# Patient Record
Sex: MALE | Race: WHITE | NOT HISPANIC OR LATINO | Employment: OTHER | URBAN - METROPOLITAN AREA
[De-identification: names, ages, dates, MRNs, and addresses within clinical notes are randomized per-mention and may not be internally consistent; named-entity substitution may affect disease eponyms.]

---

## 2017-01-31 ENCOUNTER — OFFICE VISIT (OUTPATIENT)
Dept: DERMATOLOGY | Facility: CLINIC | Age: 77
End: 2017-01-31
Payer: MEDICARE

## 2017-01-31 VITALS — RESPIRATION RATE: 14 BRPM | HEIGHT: 70 IN

## 2017-01-31 DIAGNOSIS — D48.5 NEOPLASM OF UNCERTAIN BEHAVIOR OF SKIN: Primary | ICD-10-CM

## 2017-01-31 DIAGNOSIS — L57.8 SUN-DAMAGED SKIN: ICD-10-CM

## 2017-01-31 PROCEDURE — 99999 PR PBB SHADOW E&M-EST. PATIENT-LVL III: CPT | Mod: PBBFAC,,, | Performed by: DERMATOLOGY

## 2017-01-31 PROCEDURE — 11101 PR BIOPSY, EACH ADDED LESION: CPT | Mod: PBBFAC,PO | Performed by: DERMATOLOGY

## 2017-01-31 PROCEDURE — 88342 IMHCHEM/IMCYTCHM 1ST ANTB: CPT | Mod: 26,59,, | Performed by: PATHOLOGY

## 2017-01-31 PROCEDURE — 88341 IMHCHEM/IMCYTCHM EA ADD ANTB: CPT | Mod: 26,59,, | Performed by: PATHOLOGY

## 2017-01-31 PROCEDURE — 99213 OFFICE O/P EST LOW 20 MIN: CPT | Mod: PBBFAC,PO | Performed by: DERMATOLOGY

## 2017-01-31 PROCEDURE — 11101 PR BIOPSY, EACH ADDED LESION: CPT | Mod: S$PBB,,, | Performed by: DERMATOLOGY

## 2017-01-31 PROCEDURE — 11100 PR BIOPSY OF SKIN LESION: CPT | Mod: PBBFAC,PO | Performed by: DERMATOLOGY

## 2017-01-31 PROCEDURE — 88360 TUMOR IMMUNOHISTOCHEM/MANUAL: CPT | Mod: 26,,, | Performed by: PATHOLOGY

## 2017-01-31 PROCEDURE — 99202 OFFICE O/P NEW SF 15 MIN: CPT | Mod: 25,S$PBB,, | Performed by: DERMATOLOGY

## 2017-01-31 PROCEDURE — 88305 TISSUE EXAM BY PATHOLOGIST: CPT | Performed by: PATHOLOGY

## 2017-01-31 PROCEDURE — 11100 PR BIOPSY OF SKIN LESION: CPT | Mod: S$PBB,,, | Performed by: DERMATOLOGY

## 2017-01-31 NOTE — PROGRESS NOTES
Subjective:       Patient ID:  Bib De Oliveira Sr. is a 76 y.o. male who presents for   Chief Complaint   Patient presents with    Skin Check    Lesion     HPI Comments: Pt here for IV skin check. He has never been seen by a dermatologist  Lesion on posterior neck for a few years, itches, not treated  Lesion on left ear for 6 months, itches and sometimes bleeds, not treated    No phx of skin cancer  No fhx of skin cancer  Sun exposure daily- does not wear sunscreen    Growth on left leg x 20 years, asx, no tx.     + tobacco  No additional concerns.     Review of Systems   Constitutional: Positive for weight loss (attributes to leg surgery). Negative for fever and chills.   Skin: Negative for itching, rash, daily sunscreen use and activity-related sunscreen use.   Hematologic/Lymphatic: Negative for adenopathy.        Objective:    Physical Exam   Constitutional: He appears well-developed and well-nourished. No distress.   HENT:   Ears:    Mouth/Throat: Lips normal.    Cardiovascular: There is no local extremity swelling and no dependent edema.     Lymphadenopathy: No supraclavicular adenopathy is present.     He has no cervical adenopathy.   Neurological: He is alert and oriented to person, place, and time. He is not disoriented.   Psychiatric: He has a normal mood and affect.   Skin:   Areas Examined (abnormalities noted in diagram):   Head / Face Inspection Performed  Neck Inspection Performed  Chest / Axilla Inspection Performed  Abdomen Inspection Performed  Back Inspection Performed  RUE Inspected  LUE Inspection Performed                   Diagram Legend     Erythematous scaling macule/papule c/w actinic keratosis       Vascular papule c/w angioma      Pigmented verrucoid papule/plaque c/w seborrheic keratosis      Yellow umbilicated papule c/w sebaceous hyperplasia      Irregularly shaped tan macule c/w lentigo     1-2 mm smooth white papules consistent with Milia      Movable subcutaneous cyst with  punctum c/w epidermal inclusion cyst      Subcutaneous movable cyst c/w pilar cyst      Firm pink to brown papule c/w dermatofibroma      Pedunculated fleshy papule(s) c/w skin tag(s)      Evenly pigmented macule c/w junctional nevus     Mildly variegated pigmented, slightly irregular-bordered macule c/w mildly atypical nevus      Flesh colored to evenly pigmented papule c/w intradermal nevus       Pink pearly papule/plaque c/w basal cell carcinoma      Erythematous hyperkeratotic cursted plaque c/w SCC      Surgical scar with no sign of skin cancer recurrence      Open and closed comedones      Inflammatory papules and pustules      Verrucoid papule consistent consistent with wart     Erythematous eczematous patches and plaques     Dystrophic onycholytic nail with subungual debris c/w onychomycosis     Umbilicated papule    Erythematous-base heme-crusted tan verrucoid plaque consistent with inflamed seborrheic keratosis     Erythematous Silvery Scaling Plaque c/w Psoriasis     See annotation                      Assessment / Plan:      Pathology Orders:      Normal Orders This Visit    Tissue Specimen To Pathology, Dermatology     Questions:    Directional Terms:  Other(comment)    Clinical information:  scc vs other    Specific Site:  left helix    Tissue Specimen To Pathology, Dermatology     Questions:    Directional Terms:  Other(comment)    Clinical information:  primary cutaneous SCC vs mets SCC vs other malignancy    Specific Site:  left neck    Tissue Specimen To Pathology, Dermatology     Questions:    Directional Terms:  Other(comment)    Clinical information:  rubbery pink nodule with speckled black pigmentation- r/o melanoma vs other malignancy    Specific Site:  left leg        Neoplasm of uncertain behavior of skin  -     Tissue Specimen To Pathology, Dermatology  -     Tissue Specimen To Pathology, Dermatology  -     Tissue Specimen To Pathology, Dermatology    Discussed with patient at length my  concern for cutaneous malignancy, possibly with metastasis due to large size and duration. Plan to refer to head and neck oncology pending biopsy results.     Shave biopsy procedure note:    Shave biopsy performed after verbal consent including risk of infection, scar, recurrence, need for additional treatment of site. Area prepped with alcohol, anesthetized with approximately 1.0cc of 1% lidocaine with epinephrine. Lesional tissue shaved with razor blade. Hemostasis achieved with application of aluminum chloride followed by hyfrecation. No complications. Dressing applied. Wound care explained.        Sun-damaged skin  Recommend regular derm f/u, will f/u on above.              Return in about 3 months (around 4/30/2017).

## 2017-02-06 ENCOUNTER — TELEPHONE (OUTPATIENT)
Dept: DERMATOLOGY | Facility: CLINIC | Age: 77
End: 2017-02-06

## 2017-02-14 ENCOUNTER — LAB VISIT (OUTPATIENT)
Dept: LAB | Facility: HOSPITAL | Age: 77
End: 2017-02-14
Attending: OTOLARYNGOLOGY
Payer: MEDICARE

## 2017-02-14 ENCOUNTER — OFFICE VISIT (OUTPATIENT)
Dept: OTOLARYNGOLOGY | Facility: CLINIC | Age: 77
End: 2017-02-14
Payer: MEDICARE

## 2017-02-14 VITALS
TEMPERATURE: 96 F | SYSTOLIC BLOOD PRESSURE: 130 MMHG | HEART RATE: 56 BPM | BODY MASS INDEX: 25.43 KG/M2 | WEIGHT: 177.25 LBS | DIASTOLIC BLOOD PRESSURE: 80 MMHG

## 2017-02-14 DIAGNOSIS — C44.219 BASAL CELL CARCINOMA, EAR, LEFT: ICD-10-CM

## 2017-02-14 DIAGNOSIS — C44.42 SQUAMOUS CELL CARCINOMA OF SKIN OF NECK: ICD-10-CM

## 2017-02-14 DIAGNOSIS — C44.42 SQUAMOUS CELL CARCINOMA OF SKIN OF NECK: Primary | ICD-10-CM

## 2017-02-14 DIAGNOSIS — R63.4 WEIGHT LOSS: ICD-10-CM

## 2017-02-14 LAB
ALBUMIN SERPL BCP-MCNC: 3.7 G/DL
ALBUMIN SERPL BCP-MCNC: 3.7 G/DL
ALP SERPL-CCNC: 101 U/L
ALP SERPL-CCNC: 101 U/L
ALT SERPL W/O P-5'-P-CCNC: 19 U/L
ALT SERPL W/O P-5'-P-CCNC: 19 U/L
ANION GAP SERPL CALC-SCNC: 6 MMOL/L
AST SERPL-CCNC: 16 U/L
AST SERPL-CCNC: 16 U/L
BASOPHILS # BLD AUTO: 0.02 K/UL
BASOPHILS NFR BLD: 0.3 %
BILIRUB DIRECT SERPL-MCNC: 0.1 MG/DL
BILIRUB SERPL-MCNC: 0.4 MG/DL
BILIRUB SERPL-MCNC: 0.4 MG/DL
BUN SERPL-MCNC: 13 MG/DL
CALCIUM SERPL-MCNC: 9.7 MG/DL
CHLORIDE SERPL-SCNC: 108 MMOL/L
CO2 SERPL-SCNC: 27 MMOL/L
CREAT SERPL-MCNC: 0.9 MG/DL
CREAT SERPL-MCNC: 0.9 MG/DL
DIFFERENTIAL METHOD: ABNORMAL
EOSINOPHIL # BLD AUTO: 0.1 K/UL
EOSINOPHIL NFR BLD: 1.8 %
ERYTHROCYTE [DISTWIDTH] IN BLOOD BY AUTOMATED COUNT: 15.4 %
EST. GFR  (AFRICAN AMERICAN): >60 ML/MIN/1.73 M^2
EST. GFR  (AFRICAN AMERICAN): >60 ML/MIN/1.73 M^2
EST. GFR  (NON AFRICAN AMERICAN): >60 ML/MIN/1.73 M^2
EST. GFR  (NON AFRICAN AMERICAN): >60 ML/MIN/1.73 M^2
GLUCOSE SERPL-MCNC: 113 MG/DL
HCT VFR BLD AUTO: 41.9 %
HGB BLD-MCNC: 14.2 G/DL
LYMPHOCYTES # BLD AUTO: 2 K/UL
LYMPHOCYTES NFR BLD: 31.4 %
MCH RBC QN AUTO: 30.5 PG
MCHC RBC AUTO-ENTMCNC: 33.9 %
MCV RBC AUTO: 90 FL
MONOCYTES # BLD AUTO: 0.6 K/UL
MONOCYTES NFR BLD: 9.1 %
NEUTROPHILS # BLD AUTO: 3.7 K/UL
NEUTROPHILS NFR BLD: 57.1 %
PLATELET # BLD AUTO: 235 K/UL
PMV BLD AUTO: 10 FL
POTASSIUM SERPL-SCNC: 4 MMOL/L
PREALB SERPL-MCNC: 20 MG/DL
PROT SERPL-MCNC: 7.3 G/DL
PROT SERPL-MCNC: 7.3 G/DL
RBC # BLD AUTO: 4.65 M/UL
SODIUM SERPL-SCNC: 141 MMOL/L
WBC # BLD AUTO: 6.49 K/UL

## 2017-02-14 PROCEDURE — 99999 PR PBB SHADOW E&M-EST. PATIENT-LVL III: CPT | Mod: PBBFAC,,, | Performed by: OTOLARYNGOLOGY

## 2017-02-14 PROCEDURE — 84134 ASSAY OF PREALBUMIN: CPT

## 2017-02-14 PROCEDURE — 80053 COMPREHEN METABOLIC PANEL: CPT

## 2017-02-14 PROCEDURE — 36415 COLL VENOUS BLD VENIPUNCTURE: CPT

## 2017-02-14 PROCEDURE — 85025 COMPLETE CBC W/AUTO DIFF WBC: CPT

## 2017-02-14 PROCEDURE — 99213 OFFICE O/P EST LOW 20 MIN: CPT | Mod: PBBFAC | Performed by: OTOLARYNGOLOGY

## 2017-02-14 PROCEDURE — 99204 OFFICE O/P NEW MOD 45 MIN: CPT | Mod: S$PBB,,, | Performed by: OTOLARYNGOLOGY

## 2017-02-14 NOTE — PROGRESS NOTES
Chief Complaint   Patient presents with    Follow-up         76 y.o. male presents with one year history of left ear and left posterior neck mass. Was recently evaluated by Dermatologist, Dr. Inafnte who performed biopsies of both sites. 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 He was also noted to have a left lower extremity mass for which he will see Dr. Zhu. 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  No previous history of skin cancers.      Review of Systems     Constitutional: Negative for fatigue and unexpected weight change.   HENT: per HPI.   Eyes: Negative for visual disturbance.   Respiratory: Negative for shortness of breath, hemoptysis  Cardiovascular: Negative for chest pain and palpitations.   Genitourinary: Negative for dysuria and difficulty urinating.   Musculoskeletal: Negative for decreased ROM, back pain.   Skin: Negative for rash, sunburn, itching.   Neurological: Negative for dizziness and seizures.   Hematological: Negative for adenopathy. Does not bruise/bleed easily.   Psychiatric/Behavioral: Negative for agitation. The patient is not nervous/anxious.   Endocrine: Negative for rapid weight loss/weight gain, heat/cold intolerance.     Past Medical History   Diagnosis Date    Basal cell carcinoma of left ear 12/2/2015    Closed fracture of left tibial plateau with routine healing s/p ORIF on 7/15/2016 7/5/2016    Essential hypertension, benign 12/2/2015    Hyperlipidemia     Type 2 diabetes mellitus with complication 12/2/2015       Past Surgical History   Procedure Laterality Date    Hernia repair      Orif tibia fracture  07/15/2016    External fixation tibial fracture  07/05/2016       family history includes Cancer in his brother and sister; Diabetes in his mother and sister.    Pt  reports that he has been smoking.  He has a 90.00 pack-year smoking history. He does not have any  smokeless tobacco history on file. He reports that he does not drink alcohol or use illicit drugs.    Review of patient's allergies indicates:  No Known Allergies     Physical Exam    Vitals:    02/14/17 1030   BP: 130/80   Pulse: (!) 56   Temp: (!) 95.9 °F (35.5 °C)     Body mass index is 25.43 kg/(m^2).    Physical Exam   Constitutional: He is oriented to person, place, and time. He appears well-developed and well-nourished. No distress.   HENT:   Head: Normocephalic and atraumatic.       Right Ear: Hearing, tympanic membrane, external ear and ear canal normal. Tympanic membrane mobility is normal. No middle ear effusion. No decreased hearing is noted.   Left Ear: Hearing, tympanic membrane, external ear and ear canal normal. Tympanic membrane mobility is normal.  No middle ear effusion. No decreased hearing is noted.   Nose: Nose normal.   Mouth/Throat: Oropharynx is clear and moist.   Eyes: Conjunctivae, EOM and lids are normal. Pupils are equal, round, and reactive to light. Right eye exhibits no discharge. Left eye exhibits no discharge.   Neck: Trachea normal, normal range of motion and phonation normal. Neck supple. No tracheal tenderness present. No tracheal deviation, no edema and no erythema present. No thyroid mass and no thyromegaly present.   Cardiovascular: Normal heart sounds.    Pulmonary/Chest: Breath sounds normal. No stridor.   Abdominal: Soft.   Lymphadenopathy:     He has no cervical adenopathy (shotty adenopathy in left posterior triangle).   Neurological: He is alert and oriented to person, place, and time.   Skin: Skin is warm and dry. No rash noted. He is not diaphoretic. No erythema. No pallor.   Psychiatric: He has a normal mood and affect.   Nursing note and vitals reviewed.    See previous images.    Assessment     1. 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 Basal cell carcinoma, ear, left          Plan  In summary, Mr. De Oliveira is a 77 year old male with left external ear basal  cell carcinoma and left posterior neck skin squamous cell carcinoma. No significant adenopathy to palpation however given the extent of his posterior neck skin disease must have a high suspicion of regional involvement. Will obtain CT neck to better assess. Follow up once study complete for treatment planning.

## 2017-02-14 NOTE — LETTER
February 21, 2017      Ngoc Infante MD  [address] [facility] [address]  [facility] [address]           [facility]  [address]  [facility] [address]  Phone: [phone]  Fax: [phone]          Patient: Bib De Oliveira Sr.   MR Number: [id number]   YOB: 1940   Date of Visit: 2/14/2017       Dear Dr. Ngoc Infante:    Thank you for referring Bib De Oliveira to me for evaluation. Attached you will find relevant portions of my assessment and plan of care.    If you have questions, please do not hesitate to call me. I look forward to following Bib De Oliveira along with you.    Sincerely,    Franny Sams MD    Enclosure  CC:  No Recipients    If you would like to receive this communication electronically, please contact [email or web address] or [phone] to request more information on [employer] Link access.    For providers and/or their staff who would like to refer a patient to [facility], please contact us through our one-stop-shop provider referral line, [facility], at [phone].    If you feel you have received this communication in error or would no longer like to receive these types of communications, please e-mail [email or web address]

## 2017-02-22 ENCOUNTER — HOSPITAL ENCOUNTER (OUTPATIENT)
Dept: RADIOLOGY | Facility: HOSPITAL | Age: 77
Discharge: HOME | End: 2017-02-22
Attending: OTOLARYNGOLOGY
Payer: MEDICARE

## 2017-02-22 ENCOUNTER — INITIAL CONSULT (OUTPATIENT)
Dept: DERMATOLOGY | Facility: CLINIC | Age: 77
End: 2017-02-22
Payer: MEDICARE

## 2017-02-22 VITALS
SYSTOLIC BLOOD PRESSURE: 122 MMHG | HEIGHT: 70 IN | BODY MASS INDEX: 25.05 KG/M2 | HEART RATE: 99 BPM | WEIGHT: 175 LBS | DIASTOLIC BLOOD PRESSURE: 87 MMHG

## 2017-02-22 DIAGNOSIS — C44.219 BASAL CELL CARCINOMA, EAR, LEFT: ICD-10-CM

## 2017-02-22 DIAGNOSIS — C44.719 BASAL CELL CARCINOMA, LEG, LEFT: Primary | ICD-10-CM

## 2017-02-22 DIAGNOSIS — C44.42 SQUAMOUS CELL CARCINOMA OF SKIN OF NECK: ICD-10-CM

## 2017-02-22 PROCEDURE — 25500020 PHARM REV CODE 255: Mod: PO | Performed by: OTOLARYNGOLOGY

## 2017-02-22 PROCEDURE — 70492 CT SFT TSUE NCK W/O & W/DYE: CPT | Mod: TC,PO

## 2017-02-22 PROCEDURE — 99213 OFFICE O/P EST LOW 20 MIN: CPT | Mod: S$PBB,,, | Performed by: DERMATOLOGY

## 2017-02-22 PROCEDURE — 70492 CT SFT TSUE NCK W/O & W/DYE: CPT | Mod: 26,,, | Performed by: RADIOLOGY

## 2017-02-22 PROCEDURE — 99999 PR PBB SHADOW E&M-EST. PATIENT-LVL III: CPT | Mod: PBBFAC,,, | Performed by: DERMATOLOGY

## 2017-02-22 RX ADMIN — IOHEXOL 75 ML: 350 INJECTION, SOLUTION INTRAVENOUS at 01:02

## 2017-02-22 NOTE — LETTER
February 22, 2017      Ngoc Infante MD  [address] [facility] [address] [place] [address]           [facility]  [address] [facility] [address] [place] [address]  Phone: [phone]          Patient: Bib De Oliveira Sr.   MR Number: [id number]   YOB: 1940   Date of Visit: 2/22/2017       Dear Dr. Ngoc Infante:    Thank you for referring Bib De Oliveira to me for evaluation. Attached you will find relevant portions of my assessment and plan of care.    If you have questions, please do not hesitate to call me. I look forward to following Bib De Oliveira along with you.    Sincerely,    Gilberto Katz MD    Enclosure  CC:  No Recipients    If you would like to receive this communication electronically, please contact [email or web address] or [phone] to request more information on [employer] Link access.    For providers and/or their staff who would like to refer a patient to [facility], please contact us through our one-stop-shop provider referral line, [facility], at [phone].    If you feel you have received this communication in error or would no longer like to receive these types of communications, please e-mail [email or web address]

## 2017-02-22 NOTE — MR AVS SNAPSHOT
[facility]at[facility]  [address] [facility] [address]  [facility] [address]  Phone: [phone]                  Bib BULL Alvino Becker   2017 10:30 AM   Initial consult    Description:  Male : 1940   Provider:  Gilberto Katz MD   Department:  [facility] - Dermatology           Reason for Visit     Basal Cell Carcinoma                To Do List           Future Appointments        Provider Department Dept Phone    2017 10:30 AM Gilberto Katz MD [facility] [phone]    2017 1:15 PM [facility] CT1 LIMIT [address] L[address] [facility] [phone]    3/7/2017 11:15 AM Franny Sams MD [facility] - Head[facility]Neck Surg [facility] [phone]      Goals (5 Years of Data)     None      [facility] On Call     [facility] On Call Nurse Care Line -  Assistance  Registered nurses in the [facility] Center provide clinical advisement, health education, appointment booking, and other advisory services.  Call for this free service at [phone].             Medications           Message regarding Medications     Verify the changes and/or additions to your medication regime listed below are the same as discussed with your clinician today.  If any of these changes or additions are incorrect, please notify your healthcare provider.        STOP taking these medications     rivaroxaban (XARELTO) 20 mg Tab Take 1 tablet (20 mg total) by mouth daily with dinner or evening meal.           Verify that the below list of medications is an accurate representation of the medications you are currently taking.  If none reported, the list may be blank. If incorrect, please contact your healthcare provider. Carry this list with you in case of emergency.           Current Medications     amiloride-hydrochlorothiazide 5-50mg (MODURETIC 5-50) 5-50 mg Tab TAKE 1/2 TABLET BY MOUTH EVERY DAY WITH FOOD, every morning    atorvastatin (LIPITOR) 10 MG tablet Take 1 tablet (10 mg total) by mouth once daily.     "lisinopril (PRINIVIL,ZESTRIL) 20 MG tablet Take 1 tablet (20 mg total) by mouth once daily.           Clinical Reference Information           Your Vitals Were     BP Pulse Height Weight BMI    122/87 (BP Location: Left arm, Patient Position: Sitting, BP Method: Automatic) 99 5' 10" (1.778 m) 79.4 kg (175 lb) 25.11 kg/m2      Blood Pressure          Most Recent Value    BP  122/87      Allergies as of 2/22/2017     No Known Allergies      Immunizations Administered on Date of Encounter - 2/22/2017     None      [facility] Sign-Up     Activating your [facility] account is as easy as 1-2-3!     1) Visit [email or web address], select Sign Up Now, enter this activation code and your date of birth, then select Next.  [email or web address]  Expires: 4/2/2017 10:08 AM      2) Create a username and password to use when you visit [facility] in the future and select a security question in case you lose your password and select Next.    3) Enter your e-mail address and click Sign Up!    Additional Information  If you have questions, please e-mail [email or web address] or call [phone] to talk to our [facility] staff. Remember, [facility] is NOT to be used for urgent needs. For medical emergencies, dial 911.         Smoking Cessation     If you would like to quit smoking:   You may be eligible for free services if you are a [place] resident and started smoking cigarettes before September 1, 1988.  Call the Smoking Cessation Trust (SCT) toll free at (772) 399-2910 or (798) 697-8757.   Call 1-800-QUIT-NOW if you do not meet the above criteria.            Language Assistance Services     ATTENTION: Language assistance services are available, free of charge. Please call [phone].      ATENCIÓN: Si habla español, tiene a gasca disposición servicios gratuitos de asistencia lingüística. Llame al [phone].     CHÚ Ý: N?u b?n nói Ti?ng Vi?t, có các d?ch v? h? tr? ngôn ng? mi?n phí dành cho b?n. G?i s? [phone].         [facility] complies with applicable Federal civil rights laws and does not discriminate on the basis of race, color, national origin, age, disability, or sex.

## 2017-02-22 NOTE — PROGRESS NOTES
ALLERGIES:  Review of patient's allergies indicates no known allergies.    CHIEF COMPLAINT:  This 76 y.o. male comes for evaluation for Mohs' Micrographic Surgery, Fresh Tissue Technique, for treatment of a biopsy-proven basal cell carcinoma on the left leg. Consultation requested by Ngoc Infante MD.    The patient is accompanied to this visit by his wife.     HISTORY OF PRESENT ILLNESS:   Location: left leg  Duration: 15-20years or more  Quality: persistent  Context: status post biopsy by Ngoc Infante MD; path = basal cell carcinoma; pathology accession #[id number], [facility]     Prior Treatment: none  See also the handwritten notes/diagrams scanned to chart for additional details.    Defibrillator: No  Pacemaker: No  Artificial heart valves: No  Artificial joints: No    REVIEW OF SYSTEMS:   General: general health good  Skin: he also has a large squamous cell carcinoma on the posterior left neck and a large basal cell carcinoma on the right ear; for both of which he is recently saw Dr. Sams  CV: has hypertension, no artificial valves  Resp: has no respiratory problems  Endo: has no diabetes  Hem/Lymph: not taking prescribed anticoagulants  Allergy/Immuno: has no allergies as noted above  GI: has no history of hepatitis  MS: as noted above     PAST MEDICAL HISTORY:  Past Medical History   Diagnosis Date    Basal cell carcinoma of left ear 12/2/2015    Closed fracture of left tibial plateau with routine healing s/p ORIF on 7/15/2016 7/5/2016    Essential hypertension, benign 12/2/2015    Hyperlipidemia     Type 2 diabetes mellitus with complication 12/2/2015       PAST SURGICAL HISTORY:  Past Surgical History   Procedure Laterality Date    Hernia repair      Orif tibia fracture  07/15/2016    External fixation tibial fracture  07/05/2016        SOCIAL HISTORY:  Dependencies: smoking status as noted below  Social History   Substance Use Topics    Smoking status: Current Every Day Smoker      Packs/day: 1.50     Years: 60.00    Smokeless tobacco: None    Alcohol use No       PERTINENT MEDICATIONS:  See medications list.  Current Outpatient Prescriptions on File Prior to Visit   Medication Sig Dispense Refill    amiloride-hydrochlorothiazide 5-50mg (MODURETIC 5-50) 5-50 mg Tab TAKE 1/2 TABLET BY MOUTH EVERY DAY WITH FOOD, every morning 30 tablet 3    atorvastatin (LIPITOR) 10 MG tablet Take 1 tablet (10 mg total) by mouth once daily. 30 tablet 3    lisinopril (PRINIVIL,ZESTRIL) 20 MG tablet Take 1 tablet (20 mg total) by mouth once daily. 30 tablet 3    [DISCONTINUED] rivaroxaban (XARELTO) 20 mg Tab Take 1 tablet (20 mg total) by mouth daily with dinner or evening meal. 30 tablet 3     No current facility-administered medications on file prior to visit.      ALLERGIES:  Review of patient's allergies indicates no known allergies.    EXAM:  See also the handwritten notes/diagrams scanned to chart for additional details.  Constitutional  General appearance: well-developed, well-nourished, well-kempt older white male    Eyes  Inspection of conjunctivae and lids reveals no abnormalities; sclerae anicteric  Neurologic/Psychiatric  Alert,  normal orientation to time, place, person  Normal mood and affect with no evidence of depression, anxiety, agitation  Skin: see photo(s)  Head: background marked solar damage to exposed areas of skin; in addition, inspection/palpation reveals an area of ill-defined sclerotic changes of the left ear  Neck:  on his posterior neck there is a 3-4 cm tumor  Right upper extremity: examination reveals marked chronic solar damage  Left upper extremity: examination reveals marked chronic solar damage  Left lower extremity: on his left proximal anterior/medial leg there is a 2 cm eschar with an ill-defined area of surrounding erythem    ASSESSMENT: biopsy-proven basal cell carcinoma of the left leg, ill-defined  squamous cell carcinoma of posterior neck  basal cell carcinoma  left ear  chronic solar damage to areas as noted above    PLAN:  The diagnosis of the lesion on his left leg and management options, and risks and benefits of the alternatives, including observation/non-treatment, radiation treatment, excision with vertical frozen section or paraffin-embedded section margin evaluation, and Mohs' Micrographic Surgery, Fresh Tissue Technique, were discussed at length with the patient. In particular, the discussion included, but was not limited to, the following:    One alternative at this point would be to defer further treatment and observe the lesion. With small skin cancers of this kind, it is possible that a biopsy can be sufficient to definitively treat a small skin cancer of this kind. Alternatively, some skin cancers are slow growing and do not require immediate treatment. The potential advantage of this choice would be to avoid the need for possibly unnecessary additional surgery. Among the potential disadvantages of this would be the possibility of enlargement of the lesion, more extensive spread of the lesion or recurrence at a later date, which might necessitate a larger and more complex surgery.    Radiation treatment can be an effective treatment for this type of skin cancer. The usual course of treatment is every weekday for several weeks. Local irritation will result from treatment, although no systemic side effects are expected. The potential advantage of radiation treatment is that it avoids the need for surgery. Among the disadvantages of radiation treatment are the length of treatment, the local inflammatory response, the absence of pathologic confirmation of the removal of the skin cancer, a possible increased risk of additional skin cancer in the treated area in later years, and a somewhat increased risk of recurrence at a later date.     Excisional surgery can be an effective treatment for this type of skin cancer. This would involve excision of the lesion with  margin evaluation by submitting the specimen to a pathologist for either immediate marginal assessment via frozen section processing, or delayed marginal assessment by fixed-tissue processing. The potential advantage of this technique is that it offers a way of treating the lesion with some degree of histologic confirmation of tumor removal. Among the disadvantages of this treatment are the possible need for re-excision if marginal involvement is identified, a somewhat greater likelihood of recurrence as compared to Mohs' surgery because of the less comprehensive margin evaluation inherent in the technique, and the general potential risks of surgery, including allergic reactions to the anesthetic and other materials used, infection, injury to nerves in the area with consequent loss of sensation or muscle function, and scarring or distortion of surrounding structures.    Mohs' surgery is a very effective treatment for this type of skin cancer. The potential advantage of Mohs' surgery is that this technique offers the greatest possible certainty of knowing that the skin cancer has been completely removed, with the removal of the least amount of normal tissue. The potential disadvantages of Mohs' surgery include the duration of the surgery, the possible need for a separate surgery for reconstruction following tumor removal, and scarring as a result. In addition, general potential risks of surgery as noted above also apply to treatment via Mohs' surgery.    In light of the ill-defined nature of this tumor,  Mohs' micrographic surgery was thought to be the most appropriate management choice, and this diagnosis is appropriate for treatment by Mohs' micrographic surgery.     We also discussed the lesions on his left ear and posterior neck.  Under the circumstances, we will defer treatment of the lesion on his leg, pending completion of treatment of the lesions on his neck and left ear by Dr. Sams.    Sufficient time  was available for questions, and all questions were answered to his satisfaction.     He is to return in six weeks for followup and to call prn sooner.    --------------------------------------  Note: some or all of this note may have been generated using voice recognition software and thus may contain grammatical and/or spelling errors.

## 2017-03-07 ENCOUNTER — OFFICE VISIT (OUTPATIENT)
Dept: OTOLARYNGOLOGY | Facility: CLINIC | Age: 77
End: 2017-03-07
Payer: MEDICARE

## 2017-03-07 ENCOUNTER — HOSPITAL ENCOUNTER (OUTPATIENT)
Dept: RADIOLOGY | Facility: HOSPITAL | Age: 77
Discharge: HOME | End: 2017-03-07
Attending: OTOLARYNGOLOGY
Payer: MEDICARE

## 2017-03-07 ENCOUNTER — HOSPITAL ENCOUNTER (OUTPATIENT)
Dept: CARDIOLOGY | Facility: CLINIC | Age: 77
Discharge: HOME | End: 2017-03-07
Payer: MEDICARE

## 2017-03-07 VITALS
WEIGHT: 179.88 LBS | BODY MASS INDEX: 25.81 KG/M2 | TEMPERATURE: 96 F | SYSTOLIC BLOOD PRESSURE: 112 MMHG | HEART RATE: 73 BPM | DIASTOLIC BLOOD PRESSURE: 76 MMHG

## 2017-03-07 DIAGNOSIS — C44.42 SQUAMOUS CELL CARCINOMA OF SKIN OF NECK: ICD-10-CM

## 2017-03-07 DIAGNOSIS — C44.219 BASAL CELL CARCINOMA OF LEFT EAR: ICD-10-CM

## 2017-03-07 DIAGNOSIS — C44.42 SQUAMOUS CELL CARCINOMA OF SKIN OF NECK: Primary | ICD-10-CM

## 2017-03-07 PROCEDURE — 99214 OFFICE O/P EST MOD 30 MIN: CPT | Mod: PBBFAC,25 | Performed by: OTOLARYNGOLOGY

## 2017-03-07 PROCEDURE — 93010 ELECTROCARDIOGRAM REPORT: CPT | Mod: S$PBB,,, | Performed by: INTERNAL MEDICINE

## 2017-03-07 PROCEDURE — 99214 OFFICE O/P EST MOD 30 MIN: CPT | Mod: S$PBB,,, | Performed by: OTOLARYNGOLOGY

## 2017-03-07 PROCEDURE — 99999 PR PBB SHADOW E&M-EST. PATIENT-LVL IV: CPT | Mod: PBBFAC,,, | Performed by: OTOLARYNGOLOGY

## 2017-03-07 PROCEDURE — 93005 ELECTROCARDIOGRAM TRACING: CPT | Mod: PBBFAC | Performed by: INTERNAL MEDICINE

## 2017-03-07 PROCEDURE — 71020 XR CHEST PA AND LATERAL: CPT | Mod: 26,,, | Performed by: RADIOLOGY

## 2017-03-07 RX ORDER — LIDOCAINE HYDROCHLORIDE 10 MG/ML
1 INJECTION, SOLUTION EPIDURAL; INFILTRATION; INTRACAUDAL; PERINEURAL ONCE
Status: CANCELLED | OUTPATIENT
Start: 2017-03-07 | End: 2017-03-07

## 2017-03-07 NOTE — PROGRESS NOTES
Chief Complaint   Patient presents with    Follow-up         76 y.o. male presents with one year history of left ear and left posterior neck mass. Was recently evaluated by Dermatologist, Dr. Infante who performed biopsies of both sites. 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 He was also noted to have a left lower extremity mass for which he will see Dr. Zhu. 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  No previous history of skin cancers.    Here for follow up after CT neck. No new symptoms. Off of blood thinners.    Review of Systems     Constitutional: Negative for fatigue and unexpected weight change.   HENT: per HPI.   Eyes: Negative for visual disturbance.   Respiratory: Negative for shortness of breath, hemoptysis  Cardiovascular: Negative for chest pain and palpitations.   Genitourinary: Negative for dysuria and difficulty urinating.   Musculoskeletal: Negative for decreased ROM, back pain.   Skin: Negative for rash, sunburn, itching.   Neurological: Negative for dizziness and seizures.   Hematological: Negative for adenopathy. Does not bruise/bleed easily.   Psychiatric/Behavioral: Negative for agitation. The patient is not nervous/anxious.   Endocrine: Negative for rapid weight loss/weight gain, heat/cold intolerance.     Past Medical History:   Diagnosis Date    Basal cell carcinoma of left ear 12/2/2015    Closed fracture of left tibial plateau with routine healing s/p ORIF on 7/15/2016 7/5/2016    Essential hypertension, benign 12/2/2015    Hyperlipidemia     Type 2 diabetes mellitus with complication 12/2/2015       Past Surgical History:   Procedure Laterality Date    EXTERNAL FIXATION TIBIAL FRACTURE  07/05/2016    HERNIA REPAIR      ORIF TIBIA FRACTURE  07/15/2016       family history includes Cancer in his brother and sister; Diabetes in his mother and sister.    Pt  reports that he has been  smoking.  He has a 90.00 pack-year smoking history. He does not have any smokeless tobacco history on file. He reports that he does not drink alcohol or use illicit drugs.    Review of patient's allergies indicates:  No Known Allergies     Physical Exam    Vitals:    03/07/17 1041   BP: 112/76   Pulse: 73   Temp: 96 °F (35.6 °C)     Body mass index is 25.81 kg/(m^2).    Physical Exam   Constitutional: He is oriented to person, place, and time. He appears well-developed and well-nourished. No distress.   HENT:   Head: Normocephalic and atraumatic.       Right Ear: Hearing, tympanic membrane, external ear and ear canal normal. Tympanic membrane mobility is normal. No middle ear effusion. No decreased hearing is noted.   Left Ear: Hearing, tympanic membrane, external ear and ear canal normal. Tympanic membrane mobility is normal.  No middle ear effusion. No decreased hearing is noted.   Nose: Nose normal.   Mouth/Throat: Oropharynx is clear and moist.   Eyes: Conjunctivae, EOM and lids are normal. Pupils are equal, round, and reactive to light. Right eye exhibits no discharge. Left eye exhibits no discharge.   Neck: Trachea normal, normal range of motion and phonation normal. Neck supple. No tracheal tenderness present. No tracheal deviation, no edema and no erythema present. No thyroid mass and no thyromegaly present.   Cardiovascular: Normal heart sounds.    Pulmonary/Chest: Breath sounds normal. No stridor.   Abdominal: Soft.   Lymphadenopathy:     He has no cervical adenopathy.   Neurological: He is alert and oriented to person, place, and time.   Skin: Skin is warm and dry. No rash noted. He is not diaphoretic. No erythema. No pallor.   Psychiatric: He has a normal mood and affect.   Nursing note and vitals reviewed.    See previous images.    Studies reviewed:  CT Neck  2/22/17:  Centered on image #23 series #4 is a skin mass measuring 3.5 cm transversely by 1.1 cm AP by 3.8 cm cranial caudal.  The deepest  portion of the mass extends nearly to the posterior paraspinous musculature with fat stranding and a gap measuring approximately 2 mm.  A normal size but somewhat indistinct level V lymph node is seen measuring 5 mm in smallest dimensions on series #4 image #23.  This is positioned posterior to the sternocleidomastoid muscle and approximately 3.3 cm from the central portion of the mass.  Tonsillar calcifications are noted bilaterally.  Moderate cervical spondylosis.  No distant efrain lesions are identified.  Within the thyroid gland in the upper lobe there is a well-defined hypodensity measuring 6 mm.  There is a large goiter and hypoenhancing mass which contains microcalcifications in the left lobe of the thyroid which extends nearly substernally and deviates the trachea to the right of midline.  In maximal dimensions this measures 3.9 cm AP by 3.3 cm transversely by 5.0 cm craniocaudal.  The laryngeal structures are incompletely evaluated due to motion on both the pre-and post contrast examination.  No additional lymph nodes are seen.  The parotid glands and submandibular glands are unremarkable.      Assessment     1. 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 Basal cell carcinoma of left ear          Plan  In summary, Mr. De Oliveira is a 77 year old male with left external ear basal cell carcinoma and left posterior neck skin squamous cell carcinoma. CT without evidence of regional efrain disease. Discussed treatment options xrt vs surgical excision, recommend surgery at this time which would entail wide local excision of ear and posterior neck as well as sentinel lymph node biopsy for posterior neck squamous cell carcinoma. Risks include, but are not limited to pain, bleeding, infection, scar, need for further procedures. Will likely need postoperative radiation given large size of posterior neck disease, which was also discussed. All questions were answered. Plan for surgery 3/17/17.

## 2017-03-13 ENCOUNTER — TELEPHONE (OUTPATIENT)
Dept: OTOLARYNGOLOGY | Facility: CLINIC | Age: 77
End: 2017-03-13

## 2017-03-13 NOTE — TELEPHONE ENCOUNTER
----- Message from Debra Davis sent at 3/13/2017 10:11 AM CDT -----  Call patient's son with surgery date. Call son at .

## 2017-03-13 NOTE — TELEPHONE ENCOUNTER
Spoke with Bib , son, informed him that someone will call the patient the day before surgery to give them the arrival time. Mr Bib Dunaway verbalized understanding.

## 2017-03-17 ENCOUNTER — HOSPITAL ENCOUNTER (OUTPATIENT)
Dept: RADIOLOGY | Facility: HOSPITAL | Age: 77
Discharge: HOME | End: 2017-03-17
Attending: OTOLARYNGOLOGY
Payer: MEDICARE

## 2017-03-17 ENCOUNTER — ANESTHESIA (OUTPATIENT)
Dept: SURGERY | Facility: HOSPITAL | Age: 77
End: 2017-03-17
Payer: MEDICARE

## 2017-03-17 ENCOUNTER — HOSPITAL ENCOUNTER (OUTPATIENT)
Facility: HOSPITAL | Age: 77
LOS: 1 days | Discharge: HOME | End: 2017-03-18
Attending: OTOLARYNGOLOGY | Admitting: OTOLARYNGOLOGY
Payer: MEDICARE

## 2017-03-17 ENCOUNTER — ANESTHESIA EVENT (OUTPATIENT)
Dept: SURGERY | Facility: HOSPITAL | Age: 77
End: 2017-03-17
Payer: MEDICARE

## 2017-03-17 DIAGNOSIS — C44.42 SQUAMOUS CELL CARCINOMA OF SKIN OF NECK: Primary | ICD-10-CM

## 2017-03-17 DIAGNOSIS — C44.42 SQUAMOUS CELL CARCINOMA OF SKIN OF NECK: ICD-10-CM

## 2017-03-17 DIAGNOSIS — C44.219 BASAL CELL CARCINOMA OF LEFT EAR: ICD-10-CM

## 2017-03-17 LAB
POCT GLUCOSE: 118 MG/DL (ref 70–110)
POCT GLUCOSE: 123 MG/DL (ref 70–110)

## 2017-03-17 PROCEDURE — 71000039 HC RECOVERY, EACH ADD'L HOUR: Performed by: OTOLARYNGOLOGY

## 2017-03-17 PROCEDURE — 63600175 PHARM REV CODE 636 W HCPCS: Performed by: OTOLARYNGOLOGY

## 2017-03-17 PROCEDURE — 25000003 PHARM REV CODE 250: Performed by: OTOLARYNGOLOGY

## 2017-03-17 PROCEDURE — 25000003 PHARM REV CODE 250: Performed by: NURSE ANESTHETIST, CERTIFIED REGISTERED

## 2017-03-17 PROCEDURE — 82962 GLUCOSE BLOOD TEST: CPT | Performed by: OTOLARYNGOLOGY

## 2017-03-17 PROCEDURE — 51798 US URINE CAPACITY MEASURE: CPT

## 2017-03-17 PROCEDURE — 11626 EXC S/N/H/F/G MAL+MRG >4 CM: CPT | Mod: 51,GC,, | Performed by: OTOLARYNGOLOGY

## 2017-03-17 PROCEDURE — 71000033 HC RECOVERY, INTIAL HOUR: Performed by: OTOLARYNGOLOGY

## 2017-03-17 PROCEDURE — 63600175 PHARM REV CODE 636 W HCPCS: Performed by: NURSE ANESTHETIST, CERTIFIED REGISTERED

## 2017-03-17 PROCEDURE — 36000706: Performed by: OTOLARYNGOLOGY

## 2017-03-17 PROCEDURE — 13132 CMPLX RPR F/C/C/M/N/AX/G/H/F: CPT | Mod: 59,GC,, | Performed by: OTOLARYNGOLOGY

## 2017-03-17 PROCEDURE — 69110 REMOVE EXTERNAL EAR PARTIAL: CPT | Mod: LT,GC,, | Performed by: OTOLARYNGOLOGY

## 2017-03-17 PROCEDURE — 37000009 HC ANESTHESIA EA ADD 15 MINS: Performed by: OTOLARYNGOLOGY

## 2017-03-17 PROCEDURE — 78195 LYMPH SYSTEM IMAGING: CPT | Mod: 26,,, | Performed by: NUCLEAR MEDICINE

## 2017-03-17 PROCEDURE — 78195 LYMPH SYSTEM IMAGING: CPT | Mod: TC

## 2017-03-17 PROCEDURE — 88331 PATH CONSLTJ SURG 1 BLK 1SPC: CPT | Mod: 26,,, | Performed by: PATHOLOGY

## 2017-03-17 PROCEDURE — 37000008 HC ANESTHESIA 1ST 15 MINUTES: Performed by: OTOLARYNGOLOGY

## 2017-03-17 PROCEDURE — 25000003 PHARM REV CODE 250: Performed by: STUDENT IN AN ORGANIZED HEALTH CARE EDUCATION/TRAINING PROGRAM

## 2017-03-17 PROCEDURE — 25000003 PHARM REV CODE 250: Performed by: ANESTHESIOLOGY

## 2017-03-17 PROCEDURE — 13133 CMPLX RPR F/C/C/M/N/AX/G/H/F: CPT | Mod: 59,GC,, | Performed by: OTOLARYNGOLOGY

## 2017-03-17 PROCEDURE — 36000707: Performed by: OTOLARYNGOLOGY

## 2017-03-17 PROCEDURE — 88307 TISSUE EXAM BY PATHOLOGIST: CPT | Mod: 26,,, | Performed by: PATHOLOGY

## 2017-03-17 PROCEDURE — 88305 TISSUE EXAM BY PATHOLOGIST: CPT | Performed by: PATHOLOGY

## 2017-03-17 PROCEDURE — D9220A PRA ANESTHESIA: Mod: ANES,,, | Performed by: ANESTHESIOLOGY

## 2017-03-17 PROCEDURE — 88305 TISSUE EXAM BY PATHOLOGIST: CPT | Mod: 26,,, | Performed by: PATHOLOGY

## 2017-03-17 PROCEDURE — 25000003 PHARM REV CODE 250

## 2017-03-17 PROCEDURE — D9220A PRA ANESTHESIA: Mod: CRNA,,, | Performed by: NURSE ANESTHETIST, CERTIFIED REGISTERED

## 2017-03-17 RX ORDER — OXYCODONE AND ACETAMINOPHEN 5; 325 MG/1; MG/1
TABLET ORAL
Status: COMPLETED
Start: 2017-03-17 | End: 2017-03-17

## 2017-03-17 RX ORDER — ONDANSETRON 2 MG/ML
4 INJECTION INTRAMUSCULAR; INTRAVENOUS DAILY PRN
Status: DISCONTINUED | OUTPATIENT
Start: 2017-03-17 | End: 2017-03-17

## 2017-03-17 RX ORDER — NEOSTIGMINE METHYLSULFATE 1 MG/ML
INJECTION, SOLUTION INTRAVENOUS
Status: DISCONTINUED | OUTPATIENT
Start: 2017-03-17 | End: 2017-03-17

## 2017-03-17 RX ORDER — OXYCODONE AND ACETAMINOPHEN 7.5; 325 MG/1; MG/1
1 TABLET ORAL EVERY 4 HOURS PRN
Qty: 42 TABLET | Refills: 0 | Status: SHIPPED | OUTPATIENT
Start: 2017-03-17 | End: 2017-03-18

## 2017-03-17 RX ORDER — LIDOCAINE HYDROCHLORIDE 10 MG/ML
1 INJECTION, SOLUTION EPIDURAL; INFILTRATION; INTRACAUDAL; PERINEURAL ONCE
Status: DISCONTINUED | OUTPATIENT
Start: 2017-03-17 | End: 2017-03-17

## 2017-03-17 RX ORDER — GLYCOPYRROLATE 0.2 MG/ML
INJECTION INTRAMUSCULAR; INTRAVENOUS
Status: DISCONTINUED | OUTPATIENT
Start: 2017-03-17 | End: 2017-03-17

## 2017-03-17 RX ORDER — GLUCAGON 1 MG
1 KIT INJECTION
Status: DISCONTINUED | OUTPATIENT
Start: 2017-03-17 | End: 2017-03-18 | Stop reason: HOSPADM

## 2017-03-17 RX ORDER — PROPOFOL 10 MG/ML
VIAL (ML) INTRAVENOUS
Status: DISCONTINUED | OUTPATIENT
Start: 2017-03-17 | End: 2017-03-17

## 2017-03-17 RX ORDER — LIDOCAINE HCL/EPINEPHRINE/PF 2%-1:200K
VIAL (ML) INJECTION
Status: DISCONTINUED | OUTPATIENT
Start: 2017-03-17 | End: 2017-03-17 | Stop reason: HOSPADM

## 2017-03-17 RX ORDER — LISINOPRIL 20 MG/1
20 TABLET ORAL DAILY
Status: DISCONTINUED | OUTPATIENT
Start: 2017-03-18 | End: 2017-03-18 | Stop reason: HOSPADM

## 2017-03-17 RX ORDER — INSULIN ASPART 100 [IU]/ML
0-5 INJECTION, SOLUTION INTRAVENOUS; SUBCUTANEOUS
Status: DISCONTINUED | OUTPATIENT
Start: 2017-03-17 | End: 2017-03-18 | Stop reason: HOSPADM

## 2017-03-17 RX ORDER — FENTANYL CITRATE 50 UG/ML
25 INJECTION, SOLUTION INTRAMUSCULAR; INTRAVENOUS EVERY 5 MIN PRN
Status: DISCONTINUED | OUTPATIENT
Start: 2017-03-17 | End: 2017-03-17

## 2017-03-17 RX ORDER — CEPHALEXIN 500 MG/1
500 CAPSULE ORAL EVERY 8 HOURS
Qty: 30 CAPSULE | Refills: 0 | Status: SHIPPED | OUTPATIENT
Start: 2017-03-17 | End: 2017-03-18

## 2017-03-17 RX ORDER — FENTANYL CITRATE 50 UG/ML
INJECTION, SOLUTION INTRAMUSCULAR; INTRAVENOUS
Status: DISCONTINUED | OUTPATIENT
Start: 2017-03-17 | End: 2017-03-17

## 2017-03-17 RX ORDER — PHENYLEPHRINE HYDROCHLORIDE 10 MG/ML
INJECTION INTRAVENOUS
Status: DISCONTINUED | OUTPATIENT
Start: 2017-03-17 | End: 2017-03-17

## 2017-03-17 RX ORDER — TAMSULOSIN HYDROCHLORIDE 0.4 MG/1
0.4 CAPSULE ORAL ONCE
Status: COMPLETED | OUTPATIENT
Start: 2017-03-17 | End: 2017-03-17

## 2017-03-17 RX ORDER — ONDANSETRON 8 MG/1
8 TABLET, ORALLY DISINTEGRATING ORAL EVERY 8 HOURS PRN
Qty: 21 TABLET | Refills: 0 | Status: SHIPPED | OUTPATIENT
Start: 2017-03-17 | End: 2017-04-12

## 2017-03-17 RX ORDER — SODIUM CHLORIDE 9 MG/ML
INJECTION, SOLUTION INTRAVENOUS CONTINUOUS
Status: DISCONTINUED | OUTPATIENT
Start: 2017-03-17 | End: 2017-03-17

## 2017-03-17 RX ORDER — AMOXICILLIN 250 MG
1 CAPSULE ORAL 2 TIMES DAILY
Status: DISCONTINUED | OUTPATIENT
Start: 2017-03-17 | End: 2017-03-18 | Stop reason: HOSPADM

## 2017-03-17 RX ORDER — MIDAZOLAM HYDROCHLORIDE 1 MG/ML
INJECTION, SOLUTION INTRAMUSCULAR; INTRAVENOUS
Status: DISCONTINUED | OUTPATIENT
Start: 2017-03-17 | End: 2017-03-17

## 2017-03-17 RX ORDER — LIDOCAINE HCL/PF 100 MG/5ML
SYRINGE (ML) INTRAVENOUS
Status: DISCONTINUED | OUTPATIENT
Start: 2017-03-17 | End: 2017-03-17

## 2017-03-17 RX ORDER — ONDANSETRON 2 MG/ML
INJECTION INTRAMUSCULAR; INTRAVENOUS
Status: DISCONTINUED | OUTPATIENT
Start: 2017-03-17 | End: 2017-03-17

## 2017-03-17 RX ORDER — IBUPROFEN 200 MG
24 TABLET ORAL
Status: DISCONTINUED | OUTPATIENT
Start: 2017-03-17 | End: 2017-03-18 | Stop reason: HOSPADM

## 2017-03-17 RX ORDER — ATORVASTATIN CALCIUM 10 MG/1
10 TABLET, FILM COATED ORAL DAILY
Status: DISCONTINUED | OUTPATIENT
Start: 2017-03-18 | End: 2017-03-18 | Stop reason: HOSPADM

## 2017-03-17 RX ORDER — MORPHINE SULFATE 2 MG/ML
4 INJECTION, SOLUTION INTRAMUSCULAR; INTRAVENOUS EVERY 4 HOURS PRN
Status: DISCONTINUED | OUTPATIENT
Start: 2017-03-17 | End: 2017-03-18 | Stop reason: HOSPADM

## 2017-03-17 RX ORDER — OXYCODONE AND ACETAMINOPHEN 5; 325 MG/1; MG/1
1 TABLET ORAL EVERY 4 HOURS PRN
Status: DISCONTINUED | OUTPATIENT
Start: 2017-03-17 | End: 2017-03-18 | Stop reason: HOSPADM

## 2017-03-17 RX ORDER — LABETALOL HYDROCHLORIDE 5 MG/ML
10 INJECTION, SOLUTION INTRAVENOUS EVERY 6 HOURS PRN
Status: DISCONTINUED | OUTPATIENT
Start: 2017-03-17 | End: 2017-03-18 | Stop reason: HOSPADM

## 2017-03-17 RX ORDER — BACITRACIN 500 [USP'U]/G
OINTMENT TOPICAL EVERY 12 HOURS
Status: DISCONTINUED | OUTPATIENT
Start: 2017-03-17 | End: 2017-03-18 | Stop reason: HOSPADM

## 2017-03-17 RX ORDER — BACITRACIN 500 [USP'U]/G
OINTMENT TOPICAL
Status: DISCONTINUED | OUTPATIENT
Start: 2017-03-17 | End: 2017-03-17 | Stop reason: HOSPADM

## 2017-03-17 RX ORDER — TAMSULOSIN HYDROCHLORIDE 0.4 MG/1
0.4 CAPSULE ORAL DAILY
Status: DISCONTINUED | OUTPATIENT
Start: 2017-03-18 | End: 2017-03-17

## 2017-03-17 RX ORDER — ROCURONIUM BROMIDE 10 MG/ML
INJECTION, SOLUTION INTRAVENOUS
Status: DISCONTINUED | OUTPATIENT
Start: 2017-03-17 | End: 2017-03-17

## 2017-03-17 RX ORDER — CEFAZOLIN SODIUM 2 G/50ML
2 SOLUTION INTRAVENOUS
Status: COMPLETED | OUTPATIENT
Start: 2017-03-17 | End: 2017-03-18

## 2017-03-17 RX ORDER — ONDANSETRON 2 MG/ML
4 INJECTION INTRAMUSCULAR; INTRAVENOUS EVERY 8 HOURS PRN
Status: DISCONTINUED | OUTPATIENT
Start: 2017-03-17 | End: 2017-03-18 | Stop reason: HOSPADM

## 2017-03-17 RX ORDER — SODIUM CHLORIDE 0.9 % (FLUSH) 0.9 %
3 SYRINGE (ML) INJECTION
Status: DISCONTINUED | OUTPATIENT
Start: 2017-03-17 | End: 2017-03-17

## 2017-03-17 RX ORDER — IBUPROFEN 200 MG
16 TABLET ORAL
Status: DISCONTINUED | OUTPATIENT
Start: 2017-03-17 | End: 2017-03-18 | Stop reason: HOSPADM

## 2017-03-17 RX ADMIN — MIDAZOLAM HYDROCHLORIDE 1 MG: 1 INJECTION, SOLUTION INTRAMUSCULAR; INTRAVENOUS at 11:03

## 2017-03-17 RX ADMIN — PHENYLEPHRINE HYDROCHLORIDE 100 MCG: 10 INJECTION INTRAVENOUS at 11:03

## 2017-03-17 RX ADMIN — STANDARDIZED SENNA CONCENTRATE AND DOCUSATE SODIUM 1 TABLET: 8.6; 5 TABLET, FILM COATED ORAL at 09:03

## 2017-03-17 RX ADMIN — SODIUM CHLORIDE: 0.9 INJECTION, SOLUTION INTRAVENOUS at 11:03

## 2017-03-17 RX ADMIN — BACITRACIN: 500 OINTMENT TOPICAL at 09:03

## 2017-03-17 RX ADMIN — ROCURONIUM BROMIDE 30 MG: 10 INJECTION, SOLUTION INTRAVENOUS at 11:03

## 2017-03-17 RX ADMIN — OXYCODONE HYDROCHLORIDE AND ACETAMINOPHEN 1 TABLET: 5; 325 TABLET ORAL at 11:03

## 2017-03-17 RX ADMIN — EPHEDRINE SULFATE 10 MG: 50 INJECTION, SOLUTION INTRAMUSCULAR; INTRAVENOUS; SUBCUTANEOUS at 01:03

## 2017-03-17 RX ADMIN — GLYCOPYRROLATE 0.4 MG: 0.2 INJECTION, SOLUTION INTRAMUSCULAR; INTRAVENOUS at 03:03

## 2017-03-17 RX ADMIN — EPHEDRINE SULFATE 10 MG: 50 INJECTION, SOLUTION INTRAMUSCULAR; INTRAVENOUS; SUBCUTANEOUS at 03:03

## 2017-03-17 RX ADMIN — EPHEDRINE SULFATE 10 MG: 50 INJECTION, SOLUTION INTRAMUSCULAR; INTRAVENOUS; SUBCUTANEOUS at 12:03

## 2017-03-17 RX ADMIN — ROCURONIUM BROMIDE 20 MG: 10 INJECTION, SOLUTION INTRAVENOUS at 12:03

## 2017-03-17 RX ADMIN — FENTANYL CITRATE 50 MCG: 50 INJECTION, SOLUTION INTRAMUSCULAR; INTRAVENOUS at 12:03

## 2017-03-17 RX ADMIN — OXYCODONE HYDROCHLORIDE AND ACETAMINOPHEN 1 TABLET: 5; 325 TABLET ORAL at 04:03

## 2017-03-17 RX ADMIN — ROCURONIUM BROMIDE 10 MG: 10 INJECTION, SOLUTION INTRAVENOUS at 02:03

## 2017-03-17 RX ADMIN — TAMSULOSIN HYDROCHLORIDE 0.4 MG: 0.4 CAPSULE ORAL at 07:03

## 2017-03-17 RX ADMIN — SODIUM CHLORIDE, SODIUM GLUCONATE, SODIUM ACETATE, POTASSIUM CHLORIDE, MAGNESIUM CHLORIDE, SODIUM PHOSPHATE, DIBASIC, AND POTASSIUM PHOSPHATE: .53; .5; .37; .037; .03; .012; .00082 INJECTION, SOLUTION INTRAVENOUS at 01:03

## 2017-03-17 RX ADMIN — FENTANYL CITRATE 50 MCG: 50 INJECTION, SOLUTION INTRAMUSCULAR; INTRAVENOUS at 11:03

## 2017-03-17 RX ADMIN — PHENYLEPHRINE HYDROCHLORIDE 200 MCG: 10 INJECTION INTRAVENOUS at 12:03

## 2017-03-17 RX ADMIN — Medication 2 G: at 12:03

## 2017-03-17 RX ADMIN — ROCURONIUM BROMIDE 10 MG: 10 INJECTION, SOLUTION INTRAVENOUS at 01:03

## 2017-03-17 RX ADMIN — FENTANYL CITRATE 50 MCG: 50 INJECTION, SOLUTION INTRAMUSCULAR; INTRAVENOUS at 01:03

## 2017-03-17 RX ADMIN — PHENYLEPHRINE HYDROCHLORIDE 200 MCG: 10 INJECTION INTRAVENOUS at 01:03

## 2017-03-17 RX ADMIN — ONDANSETRON 4 MG: 2 INJECTION INTRAMUSCULAR; INTRAVENOUS at 03:03

## 2017-03-17 RX ADMIN — PROPOFOL 100 MG: 10 INJECTION, EMULSION INTRAVENOUS at 11:03

## 2017-03-17 RX ADMIN — MORPHINE SULFATE 4 MG: 2 INJECTION, SOLUTION INTRAMUSCULAR; INTRAVENOUS at 06:03

## 2017-03-17 RX ADMIN — FENTANYL CITRATE 50 MCG: 50 INJECTION, SOLUTION INTRAMUSCULAR; INTRAVENOUS at 03:03

## 2017-03-17 RX ADMIN — LIDOCAINE HYDROCHLORIDE 75 MG: 20 INJECTION, SOLUTION INTRAVENOUS at 11:03

## 2017-03-17 RX ADMIN — NEOSTIGMINE METHYLSULFATE 3 MG: 1 INJECTION INTRAVENOUS at 03:03

## 2017-03-17 RX ADMIN — CEFAZOLIN SODIUM 2 G: 2 SOLUTION INTRAVENOUS at 08:03

## 2017-03-17 RX ADMIN — PHENYLEPHRINE HYDROCHLORIDE 100 MCG: 10 INJECTION INTRAVENOUS at 12:03

## 2017-03-17 NOTE — ANESTHESIA POSTPROCEDURE EVALUATION
"Anesthesia Post Evaluation    Patient: Bib BULL Alvino Ames.    Procedure(s) Performed: Procedure(s) (LRB):  EXCISION-SKIN- left posterior neck and left external ear (Left)    Final Anesthesia Type: general  Patient location during evaluation: PACU  Patient participation: Yes- Able to Participate  Level of consciousness: awake and alert and oriented  Post-procedure vital signs: reviewed and stable  Pain management: adequate  Airway patency: patent  PONV status at discharge: No PONV  Anesthetic complications: no      Cardiovascular status: blood pressure returned to baseline  Respiratory status: unassisted and room air  Hydration status: euvolemic  Follow-up not needed.        Visit Vitals    /81    Pulse 67    Temp 36 °C (96.8 °F) (Oral)    Resp 20    Ht 5' 10" (1.778 m)    Wt 81.6 kg (180 lb)    SpO2 95%    BMI 25.83 kg/m2       Pain/Loki Score: Pain Assessment Performed: Yes (3/17/2017  4:10 PM)  Presence of Pain: denies (3/17/2017  4:10 PM)  Pain Rating Prior to Med Admin: 0 (3/17/2017  4:10 PM)  Loki Score: 7 (3/17/2017  4:10 PM)      "

## 2017-03-17 NOTE — PLAN OF CARE
Problem: Patient Care Overview  Goal: Plan of Care Review  Outcome: Ongoing (interventions implemented as appropriate)  Pt and son updated by pacu rn.  Both verbalize understanding. Vss. sats 96% on room air.  Pt with sutures and vaseline guaze, well approx to left ear.  kerlex guaze dressing wrapped around neck with scant amount of drainage to noted. ti drain to bulb sxn with sang drainage to right side of neck, charged. See flowsheet for full assessment. Pt denies pain. Tolerating clear liquids in pacu. Awaiting call back from admit nurse so report can be given.

## 2017-03-17 NOTE — PROGRESS NOTES
Pt's son updated by pacu rn. Verbalizes understanding.  Notified visiting hours start again at 6pm.  Verbalizes ok. vss

## 2017-03-17 NOTE — NURSING TRANSFER
Nursing Transfer Note      3/17/2017     Transfer To: [address]    Transfer via wheelchair    Transfer with none    Transported by donte,aye     Medicines sent: none    Chart send with patient: Yes    Notified: son    Patient reassessed at: 3/17/17 1069

## 2017-03-17 NOTE — IP AVS SNAPSHOT
[facility]  [address]  [facility] [address]  Phone: [phone]           Patient Discharge Instructions     Our goal is to set you up for success. This packet includes information on your condition, medications, and your home care. It will help you to care for yourself so you don't get sicker and need to go back to the hospital.     Please ask your nurse if you have any questions.        There are many details to remember when preparing to leave the hospital. Here is what you will need to do:    1. Take your medicine. If you are prescribed medications, review your Medication List in the following pages. You may have new medications to  at the pharmacy and others that you'll need to stop taking. Review the instructions for how and when to take your medications. Talk with your doctor or nurses if you are unsure of what to do.     2. Go to your follow-up appointments. Specific follow-up information is listed in the following pages. Your may be contacted by a transition nurse or clinical provider about future appointments. Be sure we have all of the phone numbers to reach you, if needed. Please contact your provider's office if you are unable to make an appointment.     3. Watch for warning signs. Your doctor or nurse will give you detailed warning signs to watch for and when to call for assistance. These instructions may also include educational information about your condition. If you experience any of warning signs to your health, call your doctor.               [facility]  Unless otherwise directed by your provider, please contact [facility], our nurse care line that is available for 24/7 assistance.     [phone] (toll-free)    Registered nurses in the [facility] provide clinical advisement, health education, appointment booking, and other advisory services.                    ** Verify the list of medication(s) below is accurate and up  to date. Carry this with you in case of emergency. If your medications have changed, please notify your healthcare provider.             Medication List      START taking these medications        Additional Info                      bacitracin 500 unit/gram ointment   Quantity:  1 Tube   Refills:  0    Last time this was given:  3/18/2017  9:10 AM   Instructions:  Apply topically every 12 (twelve) hours.     Begin Date    AM    Noon    PM    Bedtime       cephALEXin 500 MG capsule   Commonly known as:  KEFLEX   Quantity:  21 capsule   Refills:  0   Dose:  500 mg    Instructions:  Take 1 capsule (500 mg total) by mouth every 8 (eight) hours.     Begin Date    AM    Noon    PM    Bedtime       ondansetron 8 MG Tbdl   Commonly known as:  ZOFRAN-ODT   Quantity:  21 tablet   Refills:  0   Dose:  8 mg    Instructions:  Take 1 tablet (8 mg total) by mouth every 8 (eight) hours as needed.     Begin Date    AM    Noon    PM    Bedtime       oxycodone-acetaminophen 7.5-325 mg per tablet   Commonly known as:  PERCOCET   Quantity:  42 tablet   Refills:  0   Dose:  1 tablet    Instructions:  Take 1 tablet by mouth every 4 (four) hours as needed for Pain.     Begin Date    AM    Noon    PM    Bedtime         CONTINUE taking these medications        Additional Info                      amiloride-hydrochlorothiazide 5-50mg 5-50 mg Tab   Commonly known as:  MODURETIC 5-50   Quantity:  30 tablet   Refills:  3   Comments:  This prescription was filled today. Any refills authorized will be placed on file.    Instructions:  TAKE 1/2 TABLET BY MOUTH EVERY DAY WITH FOOD, every morning     Begin Date    AM    Noon    PM    Bedtime       atorvastatin 10 MG tablet   Commonly known as:  LIPITOR   Quantity:  30 tablet   Refills:  3   Dose:  10 mg    Last time this was given:  10 mg on 3/18/2017  9:10 AM   Instructions:  Take 1 tablet (10 mg total) by mouth once daily.     Begin Date    AM    Noon    PM    Bedtime       lisinopril 20 MG tablet    Commonly known as:  IVIL,ZESTRIL   Quantity:  30 tablet   Refills:  3   Dose:  20 mg   Comments:  This prescription was filled today. Any refills authorized will be placed on file.    Last time this was given:  20 mg on 3/18/2017  9:10 AM   Instructions:  Take 1 tablet (20 mg total) by mouth once daily.     Begin Date    AM    Noon    PM    Bedtime            Where to Get Your Medications      You can get these medications from any pharmacy     Bring a paper prescription for each of these medications     bacitracin 500 unit/gram ointment    cephALEXin 500 MG capsule    ondansetron 8 MG Tbdl    oxycodone-acetaminophen 7.5-325 mg per tablet                  Please bring to all follow up appointments:    1. A copy of your discharge instructions.  2. All medicines you are currently taking in their original bottles.  3. Identification and insurance card.    Please arrive 15 minutes ahead of scheduled appointment time.    Please call 24 hours in advance if you must reschedule your appointment and/or time.        Your Scheduled Appointments     Apr 12, 2017 10:30 AM CDT   Established Patient Visit with Gilberto Katz MD   [place] [facility]    [address] [facility] [address] [place] [address]   [phone]            May 23, 2017 10:00 AM CDT   Established Patient Visit with Rajeev Oliver MD   [facility]    [address]  [facility] [address]   [phone]              Follow-up Information     Follow up with Kika Messina NP. Schedule an appointment as soon as possible for a visit on 3/24/2017.    Specialty:  Otolaryngology    Why:  For suture removal    Contact information:    [address] [facility] [address]  [phone]          Discharge Instructions     Future Orders    Call MD for:  persistent nausea and vomiting or diarrhea     Call MD for:  redness, tenderness, or signs of infection (pain, swelling, redness, odor or green/yellow discharge  "around incision site)     Call MD for:  severe uncontrolled pain     Call MD for:  temperature >100.4     Diet general     Questions:    Total calories:      Fat restriction, if any:      Protein restriction, if any:      Na restriction, if any:      Fluid restriction:      Additional restrictions:      No dressing needed     Comments:    Apply bacitracin to incision lines of ear and back of neck as prescribed    Weight bearing restrictions (specify)     Comments:    Light activity only. No heavy lifting, straining, stooping, exercising.        Primary Diagnosis     Your primary diagnosis was:  Skin Cancer      Admission Information     Date & Time Provider Department CSN    3/17/2017  9:48 AM Franny Sams MD [facility] [id number]      Care Providers     Provider Role Specialty Primary office phone    Franny Sams MD Attending Provider Otolaryngology [phone]    Farnny Sams MD Surgeon  Otolaryngology [phone]      Your Vitals Were     BP Pulse Temp Resp Height Weight    122/68 76 98 °F (36.7 °C) (Oral) 16 5' 10" (1.778 m) 81.6 kg (180 lb)    SpO2 BMI             96% 25.83 kg/m2         Recent Lab Values        12/2/2015 3/2/2016 7/6/2016 11/17/2016                 11:59 AM  9:45 AM  5:03 AM 10:01 AM        A1C 6.3 (H) 7.0 (H) 6.7 (H) 6.2 (H)        Comment for A1C at 11:59 AM on 12/2/2015:  Reference Interval:  5.0 - 5.6 Normal   5.7 - 6.4 High Risk   > 6.5 Diabetic     Hgb A1c results are standardized based on the (NGSP) National   Glycohemoglobin Standardization Program.    Hemoglobin A1C levels are related to mean serum/plasma glucose   during the preceding 2-3 months.          Comment for A1C at  9:45 AM on 3/2/2016:  Reference Interval:  5.0 - 5.6 Normal   5.7 - 6.4 High Risk   > 6.5 Diabetic     Hgb A1c results are standardized based on the (NGSP) National   Glycohemoglobin Standardization Program.    Hemoglobin A1C levels are related to mean serum/plasma " glucose   during the preceding 2-3 months.          Comment for A1C at  5:03 AM on 7/6/2016:  Reference Interval:  5.0 - 5.6 Normal   5.7 - 6.4 High Risk   > 6.5 Diabetic     Hgb A1c results are standardized based on the (NGSP) National   Glycohemoglobin Standardization Program.    Hemoglobin A1C levels are related to mean serum/plasma glucose   during the preceding 2-3 months.          Comment for A1C at 10:01 AM on 11/17/2016:  Reference Interval:  5.0 - 5.6 Normal   5.7 - 6.4 High Risk   > 6.5 Diabetic     Hgb A1c results are standardized based on the (NGSP) National   Glycohemoglobin Standardization Program.    Hemoglobin A1C levels are related to mean serum/plasma glucose   during the preceding 2-3 months.            Pending Labs     Order Current Status    Hemoglobin A1c if not done in past 6 weeks In process    Specimen to Pathology - Surgery In process      Allergies as of 3/18/2017     No Known Allergies      Advance Directives     An advance directive is a document which, in the event you are no longer able to make decisions for yourself, tells your healthcare team what kind of treatment you do or do not want to receive, or who you would like to make those decisions for you.  If you do not currently have an advance directive, [facility] encourages you to create one.  For more information call:  [phone]), [phone] ([phone]),  or log on to [email or web address].        Language Assistance Services     ATTENTION: Language assistance services are available, free of charge. Please call [phone].      ATENCIÓN: Si habla español, tiene a gasca disposición servicios gratuitos de asistencia lingüística. Llame al [phone].     CHÚ Ý: N?u b?n nói Ti?ng Vi?t, có các d?ch v? h? tr? ngôn ng? mi?n phí dành cho b?n. G?i s? [phone].        Diabetes Discharge Instructions                                   [facility] Sign-Up     Activating your [facility] account is as easy as 1-2-3!      1) Visit [email or web address], select Sign Up Now, enter this activation code and your date of birth, then select Next.  [email or web address]  Expires: 4/2/2017 11:08 AM      2) Create a username and password to use when you visit [facility] in the future and select a security question in case you lose your password and select Next.    3) Enter your e-mail address and click Sign Up!    Additional Information  If you have questions, please e-mail [email or web address] or call [phone] to talk to our [facility] staff. Remember, [facility] is NOT to be used for urgent needs. For medical emergencies, dial 911.          [facility] complies with applicable Federal civil rights laws and does not discriminate on the basis of race, color, national origin, age, disability, or sex.

## 2017-03-17 NOTE — ANESTHESIA PREPROCEDURE EVALUATION
03/17/2017  Bib De Oliveira Ember is a 76 y.o., male.    [facility] Anesthesia Evaluation    I have reviewed the Patient Summary Reports.     I have reviewed the Medications.     Review of Systems  Anesthesia Hx:  History of prior surgery of interest to airway management or planning:  Denies Personal Hx of Anesthesia complications.   Hematology/Oncology:        Current/Recent Cancer. Oncology Comments: Basal Cell   Cardiovascular:   Hypertension hyperlipidemia    Endocrine:   Diabetes        Physical Exam  General:  Well nourished    Airway/Jaw/Neck:  Airway Findings: Mouth Opening: Normal Tongue: Normal  Mallampati: III  Improves to II with phonation.  TM Distance: Normal, at least 6 cm  Jaw/Neck Findings:  Neck ROM: Normal ROM       Chest/Lungs:  Chest/Lungs Findings: Normal Respiratory Rate     Heart/Vascular:  Heart Findings: Rhythm: Regular Rhythm        Mental Status:  Mental Status Findings:  Alert and Oriented         Anesthesia Plan  Type of Anesthesia, risks & benefits discussed:  Anesthesia Type:  general  Patient's Preference: General   Intra-op Monitoring Plan: standard ASA monitors  Intra-op Monitoring Plan Comments:   Post Op Pain Control Plan:   Post Op Pain Control Plan Comments:   Induction:   IV  Beta Blocker:  Patient is not currently on a Beta-Blocker (No further documentation required).       Informed Consent: Patient understands risks and agrees with Anesthesia plan.  Questions answered. Anesthesia consent signed with patient.  ASA Score: 3     Day of Surgery Review of History & Physical:    H&P update referred to the surgeon.     Anesthesia Plan Notes: NPO confirmed.   No history of anesthesia problems.         Ready For Surgery From Anesthesia Perspective.

## 2017-03-17 NOTE — PROGRESS NOTES
Pt c/o pressure in bladder, unable void per urinal, Dr Samson notified and stated to bladdder scan if greater than 595cc ,place morris and give flomax .4mg now.d/c morris in am .

## 2017-03-17 NOTE — PROGRESS NOTES
[facility]  Surgery Department  Operative Note    SUMMARY     Date of Procedure: 3/17/2017     Procedure: Procedure(s) (LRB):  EXCISION-SKIN- left posterior neck and left external ear (Left)     Surgeon(s) and Role:     * Franny Sams MD - Primary     * Jose Moore MD - Resident - Assisting        Pre-Operative Diagnosis: Squamous cell carcinoma of skin of neck [C44.42]  Basal cell carcinoma of left ear [C44.219]    Post-Operative Diagnosis: Post-Op Diagnosis Codes:     * Squamous cell carcinoma of skin of neck [C44.42]     * Basal cell carcinoma of left ear [C44.219]    Anesthesia: General    Technical Procedures Used: see full operative note         Complications: No    Estimated Blood Loss (EBL): * No values recorded between 3/17/2017 12:43 PM and 3/17/2017  4:07 PM *           Implants: * No implants in log *    Specimens:   Specimen (12h ago through future)    Start     Ordered    03/17/17 1448  Specimen to Pathology - Surgery  Once     Comments:  1. Left ear: long stitch anterior; short stitch superior/anterior; double short stitch posterior - frozen section  2. Left ear: robson on new posterior margin with ink; short stitch superior - frozen sectiion  3. Posterior neck mass; short stitch superior; long stitch anterior - frozen section  4. Left ear: new posterior margin, short stitch superior - frozen section  5.  Left ear: new anterior margin - short stitch superior - frozen section  6. Left ear: re-reexcision anterior margin, stitch superior - frozen section    03/17/17 1456    Pending  Specimen to Pathology - Surgery  Once     Comments:  1)  Left ear:  Long stitch-anterior; short stitch-superior/anterior; double short stitch-posterior (FZ)  2)  Left ear:  Robson on new posterior with ink; short stitch-superior (FZ)  3)  Posterior neck mass:  Short stitch-superior: long stitch- anterior (FZ)  4)  Left ear:  New posterior margin-short stitch -superior (FZ)  5)  Left ear:  New  anterior margin:  Short stitch-superior (FZ)    Pending                  Condition: Good    Disposition: PACU - hemodynamically stable.    Attestation: I was present and scrubbed for the entire procedure.

## 2017-03-17 NOTE — ANESTHESIA RELEASE NOTE
"Anesthesia Release from PACU Note    Patient: Bib BULL Alvino Becker    Procedure(s) Performed: Procedure(s) (LRB):  EXCISION-SKIN- left posterior neck and left external ear (Left)    Anesthesia type: general    Post pain: Adequate analgesia    Post assessment: no apparent anesthetic complications, tolerated procedure well and no evidence of recall    Last Vitals:   Visit Vitals    /81    Pulse 67    Temp 36 °C (96.8 °F) (Oral)    Resp 20    Ht 5' 10" (1.778 m)    Wt 81.6 kg (180 lb)    SpO2 95%    BMI 25.83 kg/m2       Post vital signs: stable    Level of consciousness: awake, alert  and oriented    Nausea/Vomiting: no nausea/no vomiting    Complications: none    Airway Patency: patent    Respiratory: unassisted, spontaneous ventilation, room air     Cardiovascular: stable and blood pressure at baseline    Hydration: euvolemic     "

## 2017-03-17 NOTE — TRANSFER OF CARE
"Anesthesia Transfer of Care Note    Patient: Bib BULL Alvino Ames.    Procedure(s) Performed: Procedure(s) (LRB):  EXCISION-SKIN- left posterior neck and left external ear (Left)    Patient location: PACU    Anesthesia Type: general    Transport from OR: Transported from OR on 6-10 L/min O2 by face mask with adequate spontaneous ventilation    Post pain: adequate analgesia    Post assessment: no apparent anesthetic complications and tolerated procedure well    Post vital signs: stable    Level of consciousness: awake, alert and oriented    Nausea/Vomiting: no nausea/vomiting    Complications: none          Last vitals:   Visit Vitals    /68 (BP Location: Left arm, Patient Position: Lying, BP Method: Automatic)    Pulse 84    Temp 36.3 °C (97.4 °F) (Oral)    Resp 16    Ht 5' 10" (1.778 m)    Wt 81.6 kg (180 lb)    SpO2 100%    BMI 25.83 kg/m2     "

## 2017-03-17 NOTE — INTERVAL H&P NOTE
The patient has been examined and the H&P has been reviewed:    I concur with the findings and no changes have occurred since H&P was written.     Surgery risks, benefits and alternative options discussed and understood by patient/family.          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

## 2017-03-18 VITALS
SYSTOLIC BLOOD PRESSURE: 108 MMHG | TEMPERATURE: 97 F | BODY MASS INDEX: 25.77 KG/M2 | HEIGHT: 70 IN | RESPIRATION RATE: 16 BRPM | WEIGHT: 180 LBS | DIASTOLIC BLOOD PRESSURE: 56 MMHG | HEART RATE: 73 BPM | OXYGEN SATURATION: 97 %

## 2017-03-18 LAB
POCT GLUCOSE: 118 MG/DL (ref 70–110)
POCT GLUCOSE: 157 MG/DL (ref 70–110)

## 2017-03-18 PROCEDURE — 25000003 PHARM REV CODE 250: Performed by: OTOLARYNGOLOGY

## 2017-03-18 PROCEDURE — 63600175 PHARM REV CODE 636 W HCPCS: Performed by: OTOLARYNGOLOGY

## 2017-03-18 RX ORDER — OXYCODONE AND ACETAMINOPHEN 7.5; 325 MG/1; MG/1
1 TABLET ORAL EVERY 4 HOURS PRN
Qty: 42 TABLET | Refills: 0 | Status: SHIPPED | OUTPATIENT
Start: 2017-03-18 | End: 2017-04-12

## 2017-03-18 RX ORDER — CEPHALEXIN 500 MG/1
500 CAPSULE ORAL EVERY 8 HOURS
Qty: 21 CAPSULE | Refills: 0 | Status: SHIPPED | OUTPATIENT
Start: 2017-03-18 | End: 2017-03-25

## 2017-03-18 RX ORDER — OXYCODONE AND ACETAMINOPHEN 7.5; 325 MG/1; MG/1
1 TABLET ORAL EVERY 6 HOURS PRN
Status: DISCONTINUED | OUTPATIENT
Start: 2017-03-18 | End: 2017-03-18 | Stop reason: HOSPADM

## 2017-03-18 RX ORDER — BACITRACIN 500 [USP'U]/G
OINTMENT TOPICAL EVERY 12 HOURS
Qty: 1 TUBE | Refills: 0 | Status: SHIPPED | OUTPATIENT
Start: 2017-03-18 | End: 2017-04-12

## 2017-03-18 RX ORDER — BACITRACIN 500 [USP'U]/G
OINTMENT TOPICAL EVERY 12 HOURS
Refills: 0 | COMMUNITY
Start: 2017-03-18 | End: 2017-03-18

## 2017-03-18 RX ADMIN — LISINOPRIL 20 MG: 20 TABLET ORAL at 09:03

## 2017-03-18 RX ADMIN — BACITRACIN: 500 OINTMENT TOPICAL at 09:03

## 2017-03-18 RX ADMIN — ATORVASTATIN CALCIUM 10 MG: 10 TABLET, FILM COATED ORAL at 09:03

## 2017-03-18 RX ADMIN — OXYCODONE HYDROCHLORIDE AND ACETAMINOPHEN 1 TABLET: 5; 325 TABLET ORAL at 06:03

## 2017-03-18 RX ADMIN — CEFAZOLIN SODIUM 2 G: 2 SOLUTION INTRAVENOUS at 04:03

## 2017-03-18 RX ADMIN — STANDARDIZED SENNA CONCENTRATE AND DOCUSATE SODIUM 1 TABLET: 8.6; 5 TABLET, FILM COATED ORAL at 09:03

## 2017-03-18 NOTE — PROGRESS NOTES
Pt discharged to home via wheelchair. Pt denies pain at the present time. Condition stable. Follows commands. IV removed and catheter intact. Pt verbalized understanding of drain care and returned demonstration. Pt given prescriptions and copy of discharge home care instructions. Pt verbalized understanding of activity limitations and s/s of when to call the Dr. Pt also given information on followup appointment. All belongings with the pt. Pt verbalized understanding of all discharge instructions.

## 2017-03-20 NOTE — DISCHARGE SUMMARY
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       Discharged Condition: good    Hospital Course: Following completion of an electively scheduled procedure, he was transferred to the floor for postoperative monitoring. his hospital course was uneventful and noted for adequate pain control and PO intake following surgery. he is discharged home in good condition and will follow-up with Leslie Messina NP on 03/24/17.      Final Diagnoses: Same as principal problem.    Disposition: Home or Self Care    Follow up/Patient Instructions:    Medications:  Reconciled Home Medications:   Discharge Medication List as of 3/18/2017 10:19 AM      START taking these medications    Details   ondansetron (ZOFRAN-ODT) 8 MG TbDL Take 1 tablet (8 mg total) by mouth every 8 (eight) hours as needed., Starting 3/17/2017, Until Discontinued, Print         CONTINUE these medications which have CHANGED    Details   bacitracin 500 unit/gram ointment Apply topically every 12 (twelve) hours., Starting 3/18/2017, Until Discontinued, Print      cephALEXin (KEFLEX) 500 MG capsule Take 1 capsule (500 mg total) by mouth every 8 (eight) hours., Starting 3/18/2017, Until Sat 3/25/17, Print      oxycodone-acetaminophen (PERCOCET) 7.5-325 mg per tablet Take 1 tablet by mouth every 4 (four) hours as needed for Pain., Starting 3/18/2017, Until Discontinued, Print         CONTINUE these medications which have NOT CHANGED    Details   amiloride-hydrochlorothiazide 5-50mg (MODURETIC 5-50) 5-50 mg Tab TAKE 1/2 TABLET BY MOUTH EVERY DAY WITH FOOD, every morning, Normal      atorvastatin  (LIPITOR) 10 MG tablet Take 1 tablet (10 mg total) by mouth once daily., Starting 2/16/2017, Until Discontinued, Normal      lisinopril (PRINIVIL,ZESTRIL) 20 MG tablet Take 1 tablet (20 mg total) by mouth once daily., Starting 2/16/2017, Until Discontinued, Normal             Discharge Procedure Orders  Diet general     Weight bearing restrictions (specify)   Order Comments: Light activity only. No heavy lifting, straining, stooping, exercising.     Call MD for:  redness, tenderness, or signs of infection (pain, swelling, redness, odor or green/yellow discharge around incision site)     Call MD for:  severe uncontrolled pain     Call MD for:  persistent nausea and vomiting or diarrhea     Call MD for:  temperature >100.4     No dressing needed   Order Comments: Apply bacitracin to incision lines of ear and back of neck as prescribed       Follow-up Information     Follow up with Kika Messina NP. Schedule an appointment as soon as possible for a visit on 3/24/2017.    Specialty:  Otolaryngology    Why:  For suture removal    Contact information:    [facility] [address]  [phone]            Discharge Procedure Orders (must include Diet, Follow-up, Activity):    Discharge Procedure Orders (must include Diet, Follow-up, Activity)  Diet general     Weight bearing restrictions (specify)   Order Comments: Light activity only. No heavy lifting, straining, stooping, exercising.     Call MD for:  redness, tenderness, or signs of infection (pain, swelling, redness, odor or green/yellow discharge around incision site)     Call MD for:  severe uncontrolled pain     Call MD for:  persistent nausea and vomiting or diarrhea     Call MD for:  temperature >100.4     No dressing needed   Order Comments: Apply bacitracin to incision lines of ear and back of neck as prescribed

## 2017-03-22 NOTE — OP NOTE
DATE OF PROCEDURE:  03/17/2017.    SURGEONS:  1.  Franny Sams M.D.  2.  Jose Moore M.D. (RES).    ANESTHESIA:  General.    PREOPERATIVE DIAGNOSES:  Left ear basal cell carcinoma and left posterior neck   squamous cell carcinoma.    POSTOPERATIVE DIAGNOSES:  Left ear basal cell carcinoma and left posterior neck   squamous cell carcinoma.    PROCEDURES PERFORMED:  Partial auriculectomy and wide local excision of   posterior neck mass.    INDICATIONS FOR PROCEDURE:  The patient is a 76-year-old male with a 10-year   history of posterior neck mass as well as eroding left ear lesion.  Recent   biopsy by dermatologist did reveal basal cell carcinoma on the left ear and   squamous cell carcinoma of the posterior neck skin.  A recent CT neck did not   show any regional lymphadenopathy and lymphoscintigraphy done this morning was   negative for draining lymph node.  It was felt best to take him to the Operating   Room for above procedures.  Risks, benefits and alternatives were explained to   the patient and he wished to proceed with surgery.    DESCRIPTION OF PROCEDURE:  The patient was taken to the Operating Room and   placed under general anesthesia and then placed in the lateral decubitus   position, right side down.  Left ear and left posterior neck were prepped and   draped in a sterile fashion.  Attention was first directed to the patient's   right ear.  There was found to be approximately a 6 x 4 cm area of partial   auriculectomy of the posterior pinna.  This was taken with a #11 blade scalpel.    Hemostasis was achieved with bipolar cautery.  Once the specimen was removed,   the specimen was sent for frozen margins which came back positive posteriorly   and anteriorly.  A reexcision of the posterior margin was then taken as well as   the anterior margin; these both again were positive and subsequently a   re-reexcision posteriorly and anteriorly was taken and on our side the frozen   pathology on  these was negative for carcinoma.  Attention was directed to the   patient's posterior neck.  Wide local excision was undertaken with an   approximately 8 x 5 cm defect.  Hemostasis was achieved with monopolar and   bipolar cautery.  This was taken down to the level of the fascia.  Skin edges   appeared clear on the specimen.  Once the specimen was removed, it was sent for   frozen section and margins were negative.  The posterior neck skin was then   undermined and found to be able to be closed with primary closure.  A 10-Kiswahili   Pietro drain was placed in the wound.  Wound was well irrigated and the defect   was closed with 3-0 Vicryl, 4-0 Monocryl and 4-0 Prolene.  The ear was closed   using 4-0 Monocryl and 5-0 Prolene.  Then, a bolster was placed superiorly on   the pinna to prevent any fluid accumulation on the remaining cartilage.  The   patient was then returned back to Anesthesia care, awoken and taken for recovery   without event.    SPECIMENS SENT FOR PATHOLOGY:  Left ear with re-excisions posteriorly and   anteriorly and left posterior neck.    COMPLICATIONS:  None.    ESTIMATED BLOOD LOSS:  50 mL.      MSC/[facility]  dd: 03/22/2017 07:38:29 (CDT)  td: 03/22/2017 10:25:14 (CDT)  Doc ID   #[id number]  Job ID #[id number]    CC:

## 2017-03-23 ENCOUNTER — OFFICE VISIT (OUTPATIENT)
Dept: OTOLARYNGOLOGY | Facility: CLINIC | Age: 77
End: 2017-03-23
Payer: MEDICARE

## 2017-03-23 VITALS
TEMPERATURE: 98 F | WEIGHT: 180.75 LBS | BODY MASS INDEX: 25.94 KG/M2 | HEART RATE: 65 BPM | SYSTOLIC BLOOD PRESSURE: 108 MMHG | DIASTOLIC BLOOD PRESSURE: 67 MMHG

## 2017-03-23 DIAGNOSIS — C44.42 SQUAMOUS CELL CARCINOMA OF SKIN OF NECK: Primary | ICD-10-CM

## 2017-03-23 DIAGNOSIS — C44.219 BASAL CELL CARCINOMA OF LEFT EAR: ICD-10-CM

## 2017-03-23 PROCEDURE — 99999 PR PBB SHADOW E&M-EST. PATIENT-LVL III: CPT | Mod: PBBFAC,,, | Performed by: NURSE PRACTITIONER

## 2017-03-23 PROCEDURE — 99024 POSTOP FOLLOW-UP VISIT: CPT | Mod: ,,, | Performed by: NURSE PRACTITIONER

## 2017-03-23 PROCEDURE — 99213 OFFICE O/P EST LOW 20 MIN: CPT | Mod: PBBFAC | Performed by: NURSE PRACTITIONER

## 2017-03-23 NOTE — PROGRESS NOTES
Chief Complaint   Patient presents with    Follow-up         76 y.o. male presents for a post op visit. He has been doing well since his surgery. He has minimal pain. AVTAR drain output is <15ml in 24 hours. No complaints.    Review of Systems     Constitutional: Negative for fatigue and unexpected weight change.   HENT: per HPI.   Eyes: Negative for visual disturbance.   Respiratory: Negative for shortness of breath, hemoptysis  Cardiovascular: Negative for chest pain and palpitations.   Genitourinary: Negative for dysuria and difficulty urinating.   Musculoskeletal: Negative for decreased ROM, back pain.   Skin: Negative for rash, sunburn, itching.   Neurological: Negative for dizziness and seizures.   Hematological: Negative for adenopathy. Does not bruise/bleed easily.   Psychiatric/Behavioral: Negative for agitation. The patient is not nervous/anxious.   Endocrine: Negative for rapid weight loss/weight gain, heat/cold intolerance.     Past Medical History:   Diagnosis Date    Basal cell carcinoma of left ear 12/2/2015    Closed fracture of left tibial plateau with routine healing s/p ORIF on 7/15/2016 7/5/2016    Essential hypertension, benign 12/2/2015    Hyperlipidemia     Type 2 diabetes mellitus with complication 12/2/2015       Past Surgical History:   Procedure Laterality Date    BASAL CELL CARCINOMA EXCISION  03/17/2017    EXTERNAL FIXATION TIBIAL FRACTURE  07/05/2016    HERNIA REPAIR      ORIF TIBIA FRACTURE  07/15/2016       family history includes Cancer in his brother and sister; Diabetes in his mother and sister.    Pt  reports that he has been smoking.  He has a 90.00 pack-year smoking history. He does not have any smokeless tobacco history on file. He reports that he does not drink alcohol or use illicit drugs.    Review of patient's allergies indicates:  No Known Allergies     Physical Exam    Vitals:    03/23/17 1554   BP: 108/67   Pulse: 65   Temp: 97.8 °F (36.6 °C)     Body mass index  is 25.94 kg/(m^2).    Physical Exam   Constitutional: He appears well-developed and well-nourished. No distress.   HENT:   Head: Normocephalic and atraumatic.       Cardiovascular: Normal rate.    Pulmonary/Chest: Effort normal. No respiratory distress.   Skin: He is not diaphoretic.   Psychiatric: He has a normal mood and affect. His behavior is normal.   Vitals reviewed.    See previous images.    Studies reviewed:  CT Neck  2/22/17:  Centered on image #23 series #4 is a skin mass measuring 3.5 cm transversely by 1.1 cm AP by 3.8 cm cranial caudal.  The deepest portion of the mass extends nearly to the posterior paraspinous musculature with fat stranding and a gap measuring approximately 2 mm.  A normal size but somewhat indistinct level V lymph node is seen measuring 5 mm in smallest dimensions on series #4 image #23.  This is positioned posterior to the sternocleidomastoid muscle and approximately 3.3 cm from the central portion of the mass.  Tonsillar calcifications are noted bilaterally.  Moderate cervical spondylosis.  No distant efrain lesions are identified.  Within the thyroid gland in the upper lobe there is a well-defined hypodensity measuring 6 mm.  There is a large goiter and hypoenhancing mass which contains microcalcifications in the left lobe of the thyroid which extends nearly substernally and deviates the trachea to the right of midline.  In maximal dimensions this measures 3.9 cm AP by 3.3 cm transversely by 5.0 cm craniocaudal.  The laryngeal structures are incompletely evaluated due to motion on both the pre-and post contrast examination.  No additional lymph nodes are seen.  The parotid glands and submandibular glands are unremarkable.      Assessment     1. 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 Basal cell carcinoma of left ear          Plan  Bib De Oliveira Sr. is 1 week s/p left partial auriculectomy for BCC of the ear and wide local excision of posterior neck mass for squamous  cell carcinoma. Ear bolster and AVTAR drain removed without difficulty.  I will leave his sutures in for another week. Path report pending. He will RTC in 1 week, sooner if needed.

## 2017-03-29 ENCOUNTER — OFFICE VISIT (OUTPATIENT)
Dept: OTOLARYNGOLOGY | Facility: CLINIC | Age: 77
End: 2017-03-29
Payer: MEDICARE

## 2017-03-29 VITALS
BODY MASS INDEX: 25.08 KG/M2 | HEART RATE: 61 BPM | DIASTOLIC BLOOD PRESSURE: 74 MMHG | TEMPERATURE: 97 F | SYSTOLIC BLOOD PRESSURE: 125 MMHG | WEIGHT: 174.81 LBS

## 2017-03-29 DIAGNOSIS — C44.219 BASAL CELL CARCINOMA OF LEFT EAR: ICD-10-CM

## 2017-03-29 DIAGNOSIS — C44.42 SQUAMOUS CELL CARCINOMA OF SKIN OF NECK: Primary | ICD-10-CM

## 2017-03-29 PROCEDURE — 99999 PR PBB SHADOW E&M-EST. PATIENT-LVL III: CPT | Mod: PBBFAC,,, | Performed by: NURSE PRACTITIONER

## 2017-03-29 PROCEDURE — 99024 POSTOP FOLLOW-UP VISIT: CPT | Mod: ,,, | Performed by: NURSE PRACTITIONER

## 2017-03-29 PROCEDURE — 99213 OFFICE O/P EST LOW 20 MIN: CPT | Mod: PBBFAC | Performed by: NURSE PRACTITIONER

## 2017-03-29 NOTE — PROGRESS NOTES
Chief Complaint   Patient presents with    Follow-up         76 y.o. male presents for a post op visit. He has been doing well since his surgery. He has minimal pain. No complaints.    Review of Systems     Constitutional: Negative for fatigue and unexpected weight change.   HENT: per HPI.   Eyes: Negative for visual disturbance.   Respiratory: Negative for shortness of breath, hemoptysis  Cardiovascular: Negative for chest pain and palpitations.   Genitourinary: Negative for dysuria and difficulty urinating.   Musculoskeletal: Negative for decreased ROM, back pain.   Skin: Negative for rash, sunburn, itching.   Neurological: Negative for dizziness and seizures.   Hematological: Negative for adenopathy. Does not bruise/bleed easily.   Psychiatric/Behavioral: Negative for agitation. The patient is not nervous/anxious.   Endocrine: Negative for rapid weight loss/weight gain, heat/cold intolerance.     Past Medical History:   Diagnosis Date    Basal cell carcinoma of left ear 12/2/2015    Closed fracture of left tibial plateau with routine healing s/p ORIF on 7/15/2016 7/5/2016    Essential hypertension, benign 12/2/2015    Hyperlipidemia     Type 2 diabetes mellitus with complication 12/2/2015       Past Surgical History:   Procedure Laterality Date    BASAL CELL CARCINOMA EXCISION  03/17/2017    EXTERNAL FIXATION TIBIAL FRACTURE  07/05/2016    HERNIA REPAIR      ORIF TIBIA FRACTURE  07/15/2016       family history includes Cancer in his brother and sister; Diabetes in his mother and sister.    Pt  reports that he has been smoking.  He has a 90.00 pack-year smoking history. He does not have any smokeless tobacco history on file. He reports that he does not drink alcohol or use illicit drugs.    Review of patient's allergies indicates:  No Known Allergies     Physical Exam    Vitals:    03/29/17 0859   BP: 125/74   Pulse: 61   Temp: 97.2 °F (36.2 °C)     Body mass index is 25.08 kg/(m^2).    Physical Exam    Constitutional: He appears well-developed and well-nourished. No distress.   HENT:   Head: Normocephalic and atraumatic.       Cardiovascular: Normal rate.    Pulmonary/Chest: Effort normal. No respiratory distress.   Skin: He is not diaphoretic.   Psychiatric: He has a normal mood and affect. His behavior is normal.   Vitals reviewed.    See previous images.    Studies reviewed:  CT Neck  2/22/17:  Centered on image #23 series #4 is a skin mass measuring 3.5 cm transversely by 1.1 cm AP by 3.8 cm cranial caudal.  The deepest portion of the mass extends nearly to the posterior paraspinous musculature with fat stranding and a gap measuring approximately 2 mm.  A normal size but somewhat indistinct level V lymph node is seen measuring 5 mm in smallest dimensions on series #4 image #23.  This is positioned posterior to the sternocleidomastoid muscle and approximately 3.3 cm from the central portion of the mass.  Tonsillar calcifications are noted bilaterally.  Moderate cervical spondylosis.  No distant efrain lesions are identified.  Within the thyroid gland in the upper lobe there is a well-defined hypodensity measuring 6 mm.  There is a large goiter and hypoenhancing mass which contains microcalcifications in the left lobe of the thyroid which extends nearly substernally and deviates the trachea to the right of midline.  In maximal dimensions this measures 3.9 cm AP by 3.3 cm transversely by 5.0 cm craniocaudal.  The laryngeal structures are incompletely evaluated due to motion on both the pre-and post contrast examination.  No additional lymph nodes are seen.  The parotid glands and submandibular glands are unremarkable.      Assessment     1. 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 Basal cell carcinoma of left ear          Plan  Bib De Oliveira Sr. is 2 weeks s/p left partial auriculectomy for BCC of the ear and wide local excision of posterior neck mass for squamous cell carcinoma. Sutures removed  without difficulty. Path report pending. We will call him with the results and the plan of care based on the results. Questions answered.

## 2017-04-12 ENCOUNTER — OFFICE VISIT (OUTPATIENT)
Dept: DERMATOLOGY | Facility: CLINIC | Age: 77
End: 2017-04-12
Payer: MEDICARE

## 2017-04-12 DIAGNOSIS — C44.719 BASAL CELL CARCINOMA, LEG, LEFT: Primary | ICD-10-CM

## 2017-04-12 PROCEDURE — 99213 OFFICE O/P EST LOW 20 MIN: CPT | Mod: S$PBB,,, | Performed by: DERMATOLOGY

## 2017-04-12 PROCEDURE — 99212 OFFICE O/P EST SF 10 MIN: CPT | Mod: PBBFAC,PO | Performed by: DERMATOLOGY

## 2017-04-12 PROCEDURE — 99999 PR PBB SHADOW E&M-EST. PATIENT-LVL II: CPT | Mod: PBBFAC,,, | Performed by: DERMATOLOGY

## 2017-04-12 NOTE — PROGRESS NOTES
ALLERGIES:  Review of patient's allergies indicates no known allergies.    CHIEF COMPLAINT: followup basal cell carcinoma     The patient is accompanied to this visit by his wife.    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 Sams  Apparently, they are still pending the results of lymph node studies, which they have not yet received from Dr. Sams.  He may or may not be referred for adjuvant radiation to the neck    PERTINENT MEDICATIONS:  See medications list.  Current Outpatient Prescriptions on File Prior to Visit   Medication Sig Dispense Refill    amiloride-hydrochlorothiazide 5-50mg (MODURETIC 5-50) 5-50 mg Tab TAKE 1/2 TABLET BY MOUTH EVERY DAY WITH FOOD, every morning 30 tablet 3    atorvastatin (LIPITOR) 10 MG tablet Take 1 tablet (10 mg total) by mouth once daily. 30 tablet 3    bacitracin 500 unit/gram ointment Apply topically every 12 (twelve) hours. 1 Tube 0    furosemide (LASIX) 40 MG tablet Take 1 tablet (40 mg total) by mouth once daily. Prn edema 30 tablet 0    lisinopril (PRINIVIL,ZESTRIL) 20 MG tablet Take 1 tablet (20 mg total) by mouth once daily. 30 tablet 3    ondansetron (ZOFRAN-ODT) 8 MG TbDL Take 1 tablet (8 mg total) by mouth every 8 (eight) hours as needed. 21 tablet 0    oxycodone-acetaminophen (PERCOCET) 7.5-325 mg per tablet Take 1 tablet by mouth every 4 (four) hours as needed for Pain. 42 tablet 0    potassium chloride SA (K-DUR,KLOR-CON) 20 MEQ tablet Take 1 tablet (20 mEq total) by mouth once daily. With lasix 30 tablet 1     No current facility-administered medications on  file prior to visit.        EXAM:  Constitutional  General appearance: well-developed, well-nourished, well-kempt older white male    Eyes  Inspection of conjunctivae and lids reveals bilateral lower lid ectropion; sclerae anicteric  Neurologic/Psychiatric  Alert,  normal orientation to time, place, person  Normal mood and affect with no evidence of depression, anxiety, agitation  Skin: see photo(s)  Head: background severe solar damage to exposed areas of skin; post surgical changes left ear, with about 2/3 to 3/4 of the left lower auricle missing; there is some residual crusting along the skin edges of excision   Neck: examination reveals severe chronic solar damage; on her left posterior neck there is a healed scar at the site of recent surgery   Right upper extremity: examination reveals severe chronic solar damage  Left upper extremity: examination reveals severe chronic solar damage  Left lower extremity: there is an approximately 3 cm somewhat beige-colored somewhat indurated plaque on the left proximal medial leg; there is an approximately 1 cm area of central erythema to the plaque    ASSESSMENT:   biopsy-proven basal cell carcinoma, greater than 2 cm, left proximal leg, pending treatment  Status post subtotal auriculectomy, left ear  Status post excision, large squamous cell carcinoma, left posterior neck; lymphatic studies pending by his history   chronic solar damage to areas as noted above  personal history of non-melanoma skin cancer    PLAN:  The diagnosis of the lesion on his left leg and management options, and risks and benefits of the alternatives, including observation/non-treatment, radiation treatment, excision with vertical frozen section or paraffin-embedded section margin evaluation, and Mohs' Micrographic Surgery, Fresh Tissue Technique, were discussed at length with the patient. In particular, the discussion included, but was not limited to, the following:    One alternative at this point  would be to defer further treatment and observe the lesion. With small skin cancers of this kind, it is possible that a biopsy can be sufficient to definitively treat a small skin cancer of this kind. Alternatively, some skin cancers are slow growing and do not require immediate treatment. The potential advantage of this choice would be to avoid the need for possibly unnecessary additional surgery. Among the potential disadvantages of this would be the possibility of enlargement of the lesion, more extensive spread of the lesion or recurrence at a later date, which might necessitate a larger and more complex surgery.    Radiation treatment can be an effective treatment for this type of skin cancer. The usual course of treatment is every weekday for several weeks. Local irritation will result from treatment, although no systemic side effects are expected. The potential advantage of radiation treatment is that it avoids the need for surgery. Among the disadvantages of radiation treatment are the length of treatment, the local inflammatory response, the absence of pathologic confirmation of the removal of the skin cancer, a possible increased risk of additional skin cancer in the treated area in later years, and a somewhat increased risk of recurrence at a later date.     Excisional surgery can be an effective treatment for this type of skin cancer. This would involve excision of the lesion with margin evaluation by submitting the specimen to a pathologist for either immediate marginal assessment via frozen section processing, or delayed marginal assessment by fixed-tissue processing. The potential advantage of this technique is that it offers a way of treating the lesion with some degree of histologic confirmation of tumor removal. Among the disadvantages of this treatment are the possible need for re-excision if marginal involvement is identified, a somewhat greater likelihood of recurrence as compared to Mohs'  surgery because of the less comprehensive margin evaluation inherent in the technique, and the general potential risks of surgery, including allergic reactions to the anesthetic and other materials used, infection, injury to nerves in the area with consequent loss of sensation or muscle function, and scarring or distortion of surrounding structures.    Mohs' surgery is a very effective treatment for this type of skin cancer. The potential advantage of Mohs' surgery is that this technique offers the greatest possible certainty of knowing that the skin cancer has been completely removed, with the removal of the least amount of normal tissue. The potential disadvantages of Mohs' surgery include the duration of the surgery, the possible need for a separate surgery for reconstruction following tumor removal, and scarring as a result. In addition, general potential risks of surgery as noted above also apply to treatment via Mohs' surgery.    In light of the relatively asymptomatic nature of the lesion on his left leg at the moment, and given the other issues with which he is currently dealing, it seems reasonable to defer further treatment to the left leg lesion for the time being.  I discussed with him the possibility that, if he is referred for adjuvant radiation related to the left neck lesion which he had recently excised, radiation might become a practical alternative for treatment of the lesion on his left leg.    We also discussed options for repair of the site on his leg to follow tumor removal via Mohs' surgery, including the participation of a plastic surgeon to reconstruct the resultant wound. Given the location, the anticipated size and potential complexity of the defect to follow tumor removal via Mohs' surgery, I would anticipate coordinating reconstruction with Dr. Rosa.    For the moment, we will defer further treatment and schedule him a follow-up in 1 month for reassessment and to further coordinate  care as indicated at that time.    Sufficient time was available for questions, and all questions were answered to his satisfaction.     Discussion of the above issues constituted greater than 50% of the face-to-face encounter, the duration of which was 20 minutes.    --------------------------------------  Note: some or all of this note may have been generated using voice recognition software and thus may contain grammatical and/or spelling errors.

## 2017-05-22 ENCOUNTER — TELEPHONE (OUTPATIENT)
Dept: DERMATOLOGY | Facility: CLINIC | Age: 77
End: 2017-05-22

## 2017-07-12 ENCOUNTER — OFFICE VISIT (OUTPATIENT)
Dept: DERMATOLOGY | Facility: CLINIC | Age: 77
End: 2017-07-12
Payer: MEDICARE

## 2017-07-12 DIAGNOSIS — C44.719 BASAL CELL CARCINOMA OF LEG, LEFT: Primary | ICD-10-CM

## 2017-07-12 PROCEDURE — 99212 OFFICE O/P EST SF 10 MIN: CPT | Mod: PBBFAC,PO | Performed by: DERMATOLOGY

## 2017-07-12 PROCEDURE — 1126F AMNT PAIN NOTED NONE PRSNT: CPT | Mod: ,,, | Performed by: DERMATOLOGY

## 2017-07-12 PROCEDURE — 99999 PR PBB SHADOW E&M-EST. PATIENT-LVL II: CPT | Mod: PBBFAC,,, | Performed by: DERMATOLOGY

## 2017-07-12 PROCEDURE — 99213 OFFICE O/P EST LOW 20 MIN: CPT | Mod: S$PBB,,, | Performed by: DERMATOLOGY

## 2017-07-12 PROCEDURE — 1159F MED LIST DOCD IN RCRD: CPT | Mod: ,,, | Performed by: DERMATOLOGY

## 2017-07-12 NOTE — PROGRESS NOTES
ALLERGIES:  Review of patient's allergies indicates no known allergies.     CHIEF COMPLAINT: followup basal cell carcinoma     HISTORY OF PRESENT ILLNESS:  Location: left leg  Timing: I last saw him in April   Context: see previous notes and photos; treatment was deferred pending treatment of the squamous cell carcinoma on his neck and the large basal cell carcinoma on his left ear  Quality: currently asymptomatic    REVIEW OF SYSTEMS:   General: general health good  Skin: has previous history of skin cancer(s)    PERTINENT MEDICATIONS:  See medications list.  Current Outpatient Prescriptions on File Prior to Visit   Medication Sig Dispense Refill    amiloride-hydrochlorothiazide 5-50mg (MODURETIC 5-50) 5-50 mg Tab TAKE 1/2 TABLET BY MOUTH EVERY DAY WITH FOOD, every morning 30 tablet 3    famotidine (PEPCID) 20 MG tablet Take 1 tablet (20 mg total) by mouth 2 (two) times daily. 40 tablet 0    hydrOXYzine pamoate (VISTARIL) 25 MG Cap Take 1 capsule (25 mg total) by mouth every 8 (eight) hours as needed. 40 capsule 0    lisinopril (PRINIVIL,ZESTRIL) 20 MG tablet Take 1 tablet (20 mg total) by mouth once daily. 30 tablet 3    methylPREDNISolone (MEDROL DOSEPACK) 4 mg tablet use as directed 1 Package 0     No current facility-administered medications on file prior to visit.        EXAM:  Constitutional  General appearance: well-developed, well-nourished, well-kempt elderly white male    Eyes  Inspection of conjunctivae and lids reveals no abnormalities; sclerae anicteric  Neurologic/Psychiatric  Alert,  normal orientation to time, place, person  Normal mood and affect with no evidence of depression, anxiety, agitation  Skin: see photo(s)  Head: background extreme solar damage to exposed areas of skin; status post partial auriculectomy left ear  Neck: examination reveals extreme chronic solar damage; scar to left posterior neck  Right upper extremity: examination reveals extreme chronic solar damage  Left upper  extremity: examination reveals extreme chronic solar damage  Left lower extremity: there is an approximately 1.5-2 cm somewhat hypopigmented scar to the site on the left proximal leg as previously noted; this has a subtle, approximately 1 cm pink surrounding halo; there is no clear evidence of residual basal cell carcinoma other than the scar on examination today    ASSESSMENT:   biopsy-proven basal cell carcinoma, left leg; now clinically clear      PLAN:  The diagnosis and management options, and risks and benefits of the alternatives, including observation/non-treatment, radiation treatment, excision with vertical frozen section or paraffin-embedded section margin evaluation, and Mohs' Micrographic Surgery, Fresh Tissue Technique, were discussed at length with the patient. In particular, the discussion included, but was not limited to, the following:    One alternative at this point would be to defer further treatment and observe the lesion. With some skin cancers of this kind, it is possible that a biopsy can be sufficient to definitively treat a small skin cancer of this kind. Alternatively, some skin cancers are slow growing and do not require immediate treatment. The potential advantage of this choice would be to avoid the need for possibly unnecessary additional surgery. Among the potential disadvantages of this would be the possibility of enlargement of the lesion, more extensive spread of the lesion or recurrence at a later date, which might necessitate a larger and more complex surgery.    Radiation treatment can be an effective treatment for this type of skin cancer. The usual course of treatment is every weekday for several weeks. Local irritation will result from treatment, although no systemic side effects are expected. The potential advantage of radiation treatment is that it avoids the need for surgery. Among the disadvantages of radiation treatment are the length of treatment, the local  inflammatory response, the absence of pathologic confirmation of the removal of the skin cancer, a possible increased risk of additional skin cancer in the treated area in later years, and a somewhat increased risk of recurrence at a later date.     Excisional surgery can be an effective treatment for this type of skin cancer. This would involve excision of the lesion with margin evaluation by submitting the specimen to a pathologist for either immediate marginal assessment via frozen section processing, or delayed marginal assessment by fixed-tissue processing. The potential advantage of this technique is that it offers a way of treating the lesion with some degree of histologic confirmation of tumor removal. Among the disadvantages of this treatment are the possible need for re-excision if marginal involvement is identified, a somewhat greater likelihood of recurrence as compared to Mohs' surgery because of the less comprehensive margin evaluation inherent in the technique, and the general potential risks of surgery, including allergic reactions to the anesthetic and other materials used, infection, injury to nerves in the area with consequent loss of sensation or muscle function, and scarring or distortion of surrounding structures.    Mohs' surgery is a very effective treatment for this type of skin cancer. The potential advantage of Mohs' surgery is that this technique offers the greatest possible certainty of knowing that the skin cancer has been completely removed, with the removal of the least amount of normal tissue. The potential disadvantages of Mohs' surgery include the duration of the surgery, the possible need for a separate surgery for reconstruction following tumor removal, and scarring as a result. In addition, general potential risks of surgery as noted above also apply to treatment via Mohs' surgery.    Although I have recommended proceeding with treatment via Mohs' surgery, he would prefer to  defer any further treatment at the present, and has declined surgery.  We discussed the advantages and disadvantages of this, including the possibility of the need for more complex surgery and later date.  Nevertheless, given the findings at present, it seems reasonable to at least temporarily defer treatment.  After discussing the options with him, we will do so.    Sufficient time was available for questions, and all questions were answered to his satisfaction.     An appointment will be made for him to follow-up for re-evaluation in 3-4 months. He is to call to be seen sooner if any changes or signs of recurrence, which were reviewed, should arise in the meantime.    --------------------------------------  Note: Some or all of this note may have been generated using voice recognition software. There may be voice recognition errors including grammatical and/or spelling errors found in the text. Attempts were made to correct these errors prior to signature.

## 2017-07-21 ENCOUNTER — TELEPHONE (OUTPATIENT)
Dept: DERMATOLOGY | Facility: CLINIC | Age: 77
End: 2017-07-21

## 2017-11-08 ENCOUNTER — OFFICE VISIT (OUTPATIENT)
Dept: DERMATOLOGY | Facility: CLINIC | Age: 77
End: 2017-11-08
Payer: MEDICARE

## 2017-11-08 VITALS — BODY MASS INDEX: 24.77 KG/M2 | HEIGHT: 70 IN | WEIGHT: 173 LBS | RESPIRATION RATE: 18 BRPM

## 2017-11-08 DIAGNOSIS — C44.719 BASAL CELL CARCINOMA, LEG, LEFT: Primary | ICD-10-CM

## 2017-11-08 PROCEDURE — 99999 PR PBB SHADOW E&M-EST. PATIENT-LVL III: CPT | Mod: PBBFAC,,, | Performed by: DERMATOLOGY

## 2017-11-08 PROCEDURE — 99212 OFFICE O/P EST SF 10 MIN: CPT | Mod: S$PBB,,, | Performed by: DERMATOLOGY

## 2017-11-08 PROCEDURE — 99213 OFFICE O/P EST LOW 20 MIN: CPT | Mod: PBBFAC,PO | Performed by: DERMATOLOGY

## 2017-11-08 RX ORDER — NEOMYCIN SULFATE, POLYMYXIN B SULFATE AND DEXAMETHASONE 3.5; 10000; 1 MG/ML; [USP'U]/ML; MG/ML
1 SUSPENSION/ DROPS OPHTHALMIC
COMMUNITY
Start: 2017-09-22 | End: 2018-02-05

## 2017-11-08 RX ORDER — TRIAMCINOLONE ACETONIDE 1 MG/G
CREAM TOPICAL
Refills: 3 | COMMUNITY
Start: 2017-09-18 | End: 2017-12-20

## 2017-11-08 RX ORDER — CIPROFLOXACIN 500 MG/1
TABLET ORAL
COMMUNITY
Start: 2017-10-20 | End: 2017-11-08 | Stop reason: ALTCHOICE

## 2017-11-08 NOTE — PROGRESS NOTES
ALLERGIES:  Patient has no known allergies.    CHIEF COMPLAINT: followup basal cell carcinoma left leg    The patient is accompanied to this visit by his wife.    HISTORY OF PRESENT ILLNESS:  Location: left proximal leg  Timing: I last saw him 4 months ago   Context: see previous notes; prior biopsy showed basal cell carcinoma; he has also undergone treatment of a squamous cell carcinoma on the left neck and partial auriculectomy of an extensive basal cell carcinoma on the left ear by Dr. Sams; at his last visit to me, there was some residual scar to the site on his left leg, but no definitive signs of residual basal cell carcinoma, although the lesion had been a larged, raised tumor prior to biopsy  Quality: not symptomatic  Has developed a couple of dark spots to the area in the last few weeks    REVIEW OF SYSTEMS:   Skin: has previous history of skin cancer(s) as above    PERTINENT MEDICATIONS:  See medications list.  Current Outpatient Prescriptions on File Prior to Visit   Medication Sig Dispense Refill    amiloride-hydrochlorothiazide 5-50mg (MODURETIC 5-50) 5-50 mg Tab TAKE 1/2 TABLET BY MOUTH EVERY DAY WITH FOOD, every morning 30 tablet 4    linagliptin (TRADJENTA) 5 mg Tab tablet Take 1 tablet (5 mg total) by mouth once daily. 90 tablet 1    lisinopril (PRINIVIL,ZESTRIL) 20 MG tablet Take 1 tablet (20 mg total) by mouth once daily. 30 tablet 4    famotidine (PEPCID) 20 MG tablet Take 1 tablet (20 mg total) by mouth 2 (two) times daily. 40 tablet 0     No current facility-administered medications on file prior to visit.        EXAM:  Constitutional  General appearance: well-developed, well-nourished, well-kempt older white male    Neurologic/Psychiatric  Alert,  normal orientation to time, place, person  Normal mood and affect with no evidence of depression, anxiety, agitation  Skin: see photo(s)  Head: background extreme solar damage to exposed areas of skin; surgical changes left ear  Neck:  examination reveals extreme chronic solar damage  Right upper extremity: examination reveals extreme chronic solar damage; 2 cm ulceration right proximal extensor forearm at site of recent treatment of a skin cancer (?BCC)  Left upper extremity: examination reveals extreme chronic solar damage  Left lower extremity: see the photo there is still a 2-3 cm area of somewhat sclerotic changes at the site, which has healed otherwise; see the photo; there is no definitive evidence at the moment of residual basal cell carcinoma    ASSESSMENT:   status post biopsy, basal cell carcinoma, left proximal leg, clinically no sign of residual tumor at present  Extreme chronic solar damage to areas as noted above  personal history of non-melanoma skin cancer    PLAN:  The diagnosis and management options, and risks and benefits of the alternatives were discussed with the patient.    Given the current findings, and after discussing options including surgery or expectant observation, he would prefer to defer any treatment for the moment. He has agreed to followup with me in 2 months, or sooner if any changes arise in the meantime.   I will consider re-biopsy of the area at his followup visit, if this seems advisable.    --------------------------------------  Note: Some or all of this note may have been generated using voice recognition software. There may be voice recognition errors including grammatical and/or spelling errors found in the text. Attempts were made to correct these errors prior to signature.

## 2018-01-11 ENCOUNTER — TELEPHONE (OUTPATIENT)
Dept: DERMATOLOGY | Facility: CLINIC | Age: 78
End: 2018-01-11

## 2018-01-11 NOTE — TELEPHONE ENCOUNTER
----- Message from Kiley Humphrey sent at 1/11/2018 10:02 AM CST -----  Contact: pt at [phone]  [facility] pt- Pt needs to cancel his appt tomorrow and reschedule.  Can be reached at above number

## 2018-02-05 PROBLEM — R97.20 ABNORMAL PSA: Status: ACTIVE | Noted: 2018-02-05

## 2018-04-09 ENCOUNTER — TELEPHONE (OUTPATIENT)
Dept: DERMATOLOGY | Facility: CLINIC | Age: 78
End: 2018-04-09

## 2018-04-23 ENCOUNTER — TELEPHONE (OUTPATIENT)
Dept: DERMATOLOGY | Facility: CLINIC | Age: 78
End: 2018-04-23

## 2018-04-23 NOTE — TELEPHONE ENCOUNTER
4-23-18 Patient wife called and cancelled his appointment , he prostrate surgery, she said they will call us back in several weeks. Ale

## 2018-04-23 NOTE — TELEPHONE ENCOUNTER
4-23-18 Returned patient called talked to his wife, she cancelled his appointment because he has prostrate surgery. Ale

## 2018-09-07 PROBLEM — R19.7 DIARRHEA: Status: ACTIVE | Noted: 2018-09-07

## 2018-09-11 PROBLEM — K86.89 PANCREATIC MASS: Status: ACTIVE | Noted: 2018-09-11

## 2018-09-23 NOTE — TELEPHONE ENCOUNTER
1-11-18 Returned patient call and I cancelled his appointment for 1-12-18. Told him to call when he feels better. Ale   Declined

## 2018-10-22 PROBLEM — R94.39 ABNORMAL STRESS TEST: Status: ACTIVE | Noted: 2018-10-22

## 2018-10-22 PROBLEM — Z03.89 CORONARY ARTERY DISEASE (CAD) EXCLUDED: Status: ACTIVE | Noted: 2018-10-22

## 2019-09-30 PROBLEM — C25.9 PANCREATIC CANCER: Status: ACTIVE | Noted: 2018-10-01

## 2019-12-15 PROBLEM — S72.002A CLOSED LEFT HIP FRACTURE: Status: ACTIVE | Noted: 2019-12-15

## 2019-12-15 PROBLEM — S72.002A LEFT DISPLACED FEMORAL NECK FRACTURE: Status: ACTIVE | Noted: 2019-12-15

## 2019-12-16 PROBLEM — Z01.810 PRE-OPERATIVE CARDIOVASCULAR EXAMINATION: Status: ACTIVE | Noted: 2019-12-16

## 2020-01-08 PROBLEM — E07.9 THYROID DISEASE: Status: ACTIVE | Noted: 2020-01-08

## 2020-02-21 NOTE — PROGRESS NOTES
Addended by: MARLENE OLIVO on: 2/21/2020 09:28 AM     Modules accepted: Orders     Pt's son updated on room 526A.  Notified ok to go to room, verbalizes ok.  Attempting to call report, unable to give report at this time. No answer. Will try nurse back in a few minutes. Vss.

## 2020-07-15 PROBLEM — I95.9 HYPOTENSION: Status: ACTIVE | Noted: 2020-07-15

## 2020-07-15 PROBLEM — N20.0 NEPHROLITHIASIS: Status: ACTIVE | Noted: 2020-07-15

## 2020-07-15 PROBLEM — N17.9 AKI (ACUTE KIDNEY INJURY): Status: ACTIVE | Noted: 2020-07-15

## 2020-07-15 PROBLEM — N39.0 UTI (URINARY TRACT INFECTION): Status: ACTIVE | Noted: 2020-07-15

## 2020-07-16 PROBLEM — K57.92 ACUTE DIVERTICULITIS: Status: ACTIVE | Noted: 2020-07-16

## 2020-07-16 PROBLEM — S32.000A LUMBAR COMPRESSION FRACTURE: Status: ACTIVE | Noted: 2020-07-16

## 2020-07-16 PROBLEM — E44.0 MODERATE MALNUTRITION: Status: ACTIVE | Noted: 2020-07-16

## 2020-07-17 DIAGNOSIS — M54.9 DORSALGIA, UNSPECIFIED: ICD-10-CM

## 2020-07-17 PROBLEM — S32.030A CLOSED COMPRESSION FRACTURE OF L3 VERTEBRA: Status: ACTIVE | Noted: 2020-07-17

## 2020-07-29 ENCOUNTER — TELEPHONE (OUTPATIENT)
Dept: PAIN MEDICINE | Facility: CLINIC | Age: 80
End: 2020-07-29

## 2020-07-29 ENCOUNTER — HOSPITAL ENCOUNTER (OUTPATIENT)
Dept: RADIOLOGY | Facility: HOSPITAL | Age: 80
Discharge: HOME | End: 2020-07-29
Attending: ANESTHESIOLOGY
Payer: MEDICARE

## 2020-07-29 ENCOUNTER — OFFICE VISIT (OUTPATIENT)
Dept: PAIN MEDICINE | Facility: CLINIC | Age: 80
End: 2020-07-29
Payer: MEDICARE

## 2020-07-29 VITALS
BODY MASS INDEX: 19.33 KG/M2 | RESPIRATION RATE: 18 BRPM | HEART RATE: 71 BPM | HEIGHT: 70 IN | SYSTOLIC BLOOD PRESSURE: 121 MMHG | DIASTOLIC BLOOD PRESSURE: 63 MMHG | WEIGHT: 135 LBS

## 2020-07-29 DIAGNOSIS — M54.9 DORSALGIA, UNSPECIFIED: ICD-10-CM

## 2020-07-29 DIAGNOSIS — Z85.07 HISTORY OF PANCREATIC CANCER: ICD-10-CM

## 2020-07-29 DIAGNOSIS — Z13.9 SCREENING PROCEDURE: ICD-10-CM

## 2020-07-29 DIAGNOSIS — S32.030A CLOSED COMPRESSION FRACTURE OF THIRD LUMBAR VERTEBRA, INITIAL ENCOUNTER: Primary | ICD-10-CM

## 2020-07-29 PROCEDURE — 72148 MRI LUMBAR SPINE W/O DYE: CPT | Mod: 26,,, | Performed by: RADIOLOGY

## 2020-07-29 PROCEDURE — 99999 PR PBB SHADOW E&M-EST. PATIENT-LVL III: CPT | Mod: PBBFAC,,, | Performed by: ANESTHESIOLOGY

## 2020-07-29 PROCEDURE — 99204 OFFICE O/P NEW MOD 45 MIN: CPT | Mod: S$PBB,,, | Performed by: ANESTHESIOLOGY

## 2020-07-29 PROCEDURE — 72148 MRI LUMBAR SPINE WITHOUT CONTRAST: ICD-10-PCS | Mod: 26,,, | Performed by: RADIOLOGY

## 2020-07-29 PROCEDURE — 99213 OFFICE O/P EST LOW 20 MIN: CPT | Mod: PBBFAC,PN,25 | Performed by: ANESTHESIOLOGY

## 2020-07-29 PROCEDURE — 99204 PR OFFICE/OUTPT VISIT, NEW, LEVL IV, 45-59 MIN: ICD-10-PCS | Mod: S$PBB,,, | Performed by: ANESTHESIOLOGY

## 2020-07-29 PROCEDURE — 72148 MRI LUMBAR SPINE W/O DYE: CPT | Mod: TC,PO

## 2020-07-29 PROCEDURE — 99999 PR PBB SHADOW E&M-EST. PATIENT-LVL III: ICD-10-PCS | Mod: PBBFAC,,, | Performed by: ANESTHESIOLOGY

## 2020-07-29 RX ORDER — HYDROCHLOROTHIAZIDE 25 MG/1
25 TABLET ORAL DAILY
COMMUNITY
End: 2020-08-20

## 2020-07-29 RX ORDER — OXYCODONE AND ACETAMINOPHEN 7.5; 325 MG/1; MG/1
1 TABLET ORAL EVERY 6 HOURS PRN
Qty: 24 TABLET | Refills: 0 | Status: SHIPPED | OUTPATIENT
Start: 2020-07-29 | End: 2020-08-26

## 2020-07-29 NOTE — TELEPHONE ENCOUNTER
Spoke with patient and his wife. The kyphoplasty has been scheduled for 8/3. Pre-op instructions reviewed.

## 2020-07-29 NOTE — H&P (VIEW-ONLY)
This note was completed with dictation software and grammatical errors may exist.    CC:  Back pain    HPI:  The patient is an 80-year-old man with a history of pancreatic cancer, tobacco use who presents in referral from Dr. Rajeev Oliver for back pain.  The patient reports that 2 months ago he was lifting a trailer and felt a sudden back pain.  Since then he has had continued severe back pain.  Is located in the low back and into the sacrum.  He states that it radiates out to the side slightly but denies any radiation into the legs.  He describes it as burning, throbbing, grabbing, tight, tingling, numb, sharp, electric shooting and hot and cold.  It is worse with prolonged sitting or standing, doing any walking, bending, flexing, lifting and with getting out of a bed or a chair, causes difficulty with trying to stand up, he feels weak in the legs.  He has been mostly sitting in a wheelchair, not walking much on his own, having difficulty timing issues and getting dressed.  He gets some relief with lying down and with medications.  When he 1st was injured, this was diagnosis of possible muscle strain, he continued to have severe pain however.  He ended up developing what sounds like a urinary tract infection and had been admitted to a hospital where they found renal calculi but on a CT scan they also noted an L3 compression fracture of unclear age but he did not have this several months prior according to other imaging of that area.    Pain intervention history: none  Antineuropathics:  NSAIDs:  Physical therapy:  Antidepressants:  Muscle relaxers:  Opioids: He has been taking hydrocodone 7.5/325 every 6 hours and states that it does not help much.  He was actually started on tramadol, went to 5 milligram hydrocodone and eventually on 7.5 and he states that it does nothing.  Antiplatelets/Anticoagulants:        ROS:  He reports weight loss, vision change, eye pain, cough, appetite change, diarrhea, easy  bruising, urinary urgency, renal calculi, loss of balance.  Balance of review systems is negative.    Lab Results   Component Value Date    LABA1C 7.1 01/06/2020    HGBA1C 7.5 (H) 04/20/2020       Lab Results   Component Value Date    WBC 6.74 07/17/2020    HGB 10.7 (L) 07/17/2020    HCT 32.2 (L) 07/17/2020    MCV 89 07/17/2020     07/17/2020             Past Medical History:   Diagnosis Date    Abnormal PSA     Basal cell carcinoma of left ear 12/2/2015    Closed fracture of left tibial plateau with routine healing s/p ORIF on 7/15/2016 7/5/2016    Coronary artery disease (CAD) excluded 10/22/2018    DVT (deep venous thrombosis)     Essential hypertension, benign 12/2/2015    Hyperlipidemia     Pancreatic cancer 10/2018    Thyroid disease     Type 2 diabetes mellitus with complication 12/2/2015       Past Surgical History:   Procedure Laterality Date    BASAL CELL CARCINOMA EXCISION  03/17/2017    BLADDER SURGERY      COLONOSCOPY N/A 9/7/2018    Procedure: COLONOSCOPY;  Surgeon: Olu Ahn MD;  Location: [facility] ENDO;  Service: Endoscopy;  Laterality: N/A;    CORONARY ANGIOGRAPHY N/A 10/22/2018    Procedure: ANGIOGRAM, CORONARY ARTERY;  Surgeon: Kevin Huziar MD;  Location: [facility] CATH;  Service: Cardiovascular;  Laterality: N/A;    ENDOSCOPIC ULTRASOUND OF UPPER GASTROINTESTINAL TRACT Left 9/11/2018    Procedure: ULTRASOUND, ENDOSCOPIC, UPPER GI TRACT;  Surgeon: Joss May MD;  Location: [facility] ENDO;  Service: Endoscopy;  Laterality: Left;  Linear scope    ESOPHAGOGASTRODUODENOSCOPY N/A 9/11/2018    Procedure: EGD (ESOPHAGOGASTRODUODENOSCOPY);  Surgeon: Joss May MD;  Location: [facility] ENDO;  Service: Endoscopy;  Laterality: N/A;    EXTERNAL FIXATION TIBIAL FRACTURE  07/05/2016    HERNIA REPAIR      LEFT HEART CATHETERIZATION Left 10/22/2018    Procedure: Left heart cath;  Surgeon: Kevin Huizar MD;  Location: [facility] CATH;  Service: Cardiovascular;  Laterality: Left;  "   ORIF TIBIA FRACTURE  07/15/2016    PANCREAS SURGERY  10/31/2018    PARTIAL HIP ARTHROPLASTY      PROSTATE BIOPSY         Social History     Socioeconomic History    Marital status:      Spouse name: Not on file    Number of children: Not on file    Years of education: Not on file    Highest education level: Not on file   Occupational History    Not on file   Social Needs    Financial resource strain: Not on file    Food insecurity     Worry: Not on file     Inability: Not on file    Transportation needs     Medical: Not on file     Non-medical: Not on file   Tobacco Use    Smoking status: Current Every Day Smoker     Packs/day: 1.50     Years: 60.00     Pack years: 90.00    Smokeless tobacco: Never Used   Substance and Sexual Activity    Alcohol use: No     Alcohol/week: 0.0 standard drinks    Drug use: No    Sexual activity: Not on file   Lifestyle    Physical activity     Days per week: Not on file     Minutes per session: Not on file    Stress: Not on file   Relationships    Social connections     Talks on phone: Not on file     Gets together: Not on file     Attends Sikh service: Not on file     Active member of club or organization: Not on file     Attends meetings of clubs or organizations: Not on file     Relationship status: Not on file   Other Topics Concern    Not on file   Social History Narrative    Not on file         Medications/Allergies: See med card    Vitals:    07/29/20 1016   BP: 121/63   Pulse: 71   Resp: 18   Weight: 61.2 kg (135 lb)   Height: 5' 10" (1.778 m)   PainSc: 10-Worst pain ever   PainLoc: Back         Physical exam:  Gen: A and O x3, pleasant, well-groomed  Skin: No rashes or obvious lesions  HEENT: PERRLA, no obvious deformities on ears or in canals. Trachea midline.  CVS: Regular rate and rhythm, normal palpable pulses.  Resp:No increased work of breathing, symmetrical chest rise.  Abdomen: Soft, NT/ND.  Musculoskeletal:  Presents in a " wheelchair, able to stand but difficulty with balance.    Neuro:  Lower extremities: 5/5 strength bilaterally, except for hip flexion 4/5 bilaterally  Reflexes: Patellar 1+, Achilles 1+ bilaterally.  Sensory:  Intact and symmetrical to light touch and pinprick in L2-S1 dermatomes bilaterally.    Lumbar spine:  Lumbar spine:  Range of motion severely reduced with both flexion and extension with increased pain especially on extension.  Jamir's test deferred  Supine straight leg raise is negative bilaterally.    Internal and external rotation of the hip causes no increased pain on either side.  Myofascial exam:  There is pain with percussion over the lower lumbar region, pain with pressure over the lower spinous processes.    Imagin2020 CT abdomen and pelvis renal stone protocol:  This is a report only but mentions a moderate compression fracture at L3 with slight retropulsion.  Unclear age.    Assessment:  The patient is an 80-year-old man with a history of pancreatic cancer, tobacco use who presents in referral from Dr. Rajeev Oliver for back pain.    1. Closed compression fracture of third lumbar vertebra, initial encounter     2. Dorsalgia, unspecified  MRI Lumbar Spine Without Contrast   3. History of pancreatic cancer         Plan:  1.  We discussed that he has had pain for 2 months, this is somewhat unusual for compression fracture, I would expected that this would have ease slightly.  We discussed that he may have incomplete healing or the pain may not be coming from the fracture but more from possible stenosis at that level due to retropulsion or other degenerative processes.  We are going to get an MRI and I will call him with the results.  We are going to try to get this today.  He does have some weakness with hip flexion I am not sure if that is because of deconditioning or if this has something to do with L3 impingement.  2.  He requested stronger pain medication, I have given him Percocet  7.5/325 but also explained that the pain medication is not going to completely relieve his symptoms and I suspect that the reason why the pain medication is not working is more because he has developed tolerance from taking this not because the pain is worsening.    Thank you for referring this interesting patient, and I look forward to continuing to collaborate in his care.

## 2020-07-29 NOTE — TELEPHONE ENCOUNTER
Please let the patient know that I have reviewed his lumbar spine MRI and he has a fracture at both L2 and L3, these continue to look fairly new so they are not yet healed.  My recommendation would be to move forward with kyphoplasty procedure at L2 and L3 as soon as possible.

## 2020-07-29 NOTE — PROGRESS NOTES
This note was completed with dictation software and grammatical errors may exist.    CC:  Back pain    HPI:  The patient is an 80-year-old man with a history of pancreatic cancer, tobacco use who presents in referral from Dr. Rajeev Oliver for back pain.  The patient reports that 2 months ago he was lifting a trailer and felt a sudden back pain.  Since then he has had continued severe back pain.  Is located in the low back and into the sacrum.  He states that it radiates out to the side slightly but denies any radiation into the legs.  He describes it as burning, throbbing, grabbing, tight, tingling, numb, sharp, electric shooting and hot and cold.  It is worse with prolonged sitting or standing, doing any walking, bending, flexing, lifting and with getting out of a bed or a chair, causes difficulty with trying to stand up, he feels weak in the legs.  He has been mostly sitting in a wheelchair, not walking much on his own, having difficulty timing issues and getting dressed.  He gets some relief with lying down and with medications.  When he 1st was injured, this was diagnosis of possible muscle strain, he continued to have severe pain however.  He ended up developing what sounds like a urinary tract infection and had been admitted to a hospital where they found renal calculi but on a CT scan they also noted an L3 compression fracture of unclear age but he did not have this several months prior according to other imaging of that area.    Pain intervention history: none  Antineuropathics:  NSAIDs:  Physical therapy:  Antidepressants:  Muscle relaxers:  Opioids: He has been taking hydrocodone 7.5/325 every 6 hours and states that it does not help much.  He was actually started on tramadol, went to 5 milligram hydrocodone and eventually on 7.5 and he states that it does nothing.  Antiplatelets/Anticoagulants:        ROS:  He reports weight loss, vision change, eye pain, cough, appetite change, diarrhea, easy  bruising, urinary urgency, renal calculi, loss of balance.  Balance of review systems is negative.    Lab Results   Component Value Date    LABA1C 7.1 01/06/2020    HGBA1C 7.5 (H) 04/20/2020       Lab Results   Component Value Date    WBC 6.74 07/17/2020    HGB 10.7 (L) 07/17/2020    HCT 32.2 (L) 07/17/2020    MCV 89 07/17/2020     07/17/2020             Past Medical History:   Diagnosis Date    Abnormal PSA     Basal cell carcinoma of left ear 12/2/2015    Closed fracture of left tibial plateau with routine healing s/p ORIF on 7/15/2016 7/5/2016    Coronary artery disease (CAD) excluded 10/22/2018    DVT (deep venous thrombosis)     Essential hypertension, benign 12/2/2015    Hyperlipidemia     Pancreatic cancer 10/2018    Thyroid disease     Type 2 diabetes mellitus with complication 12/2/2015       Past Surgical History:   Procedure Laterality Date    BASAL CELL CARCINOMA EXCISION  03/17/2017    BLADDER SURGERY      COLONOSCOPY N/A 9/7/2018    Procedure: COLONOSCOPY;  Surgeon: Olu Ahn MD;  Location: [facility] ENDO;  Service: Endoscopy;  Laterality: N/A;    CORONARY ANGIOGRAPHY N/A 10/22/2018    Procedure: ANGIOGRAM, CORONARY ARTERY;  Surgeon: Kvein Huizar MD;  Location: [facility] CATH;  Service: Cardiovascular;  Laterality: N/A;    ENDOSCOPIC ULTRASOUND OF UPPER GASTROINTESTINAL TRACT Left 9/11/2018    Procedure: ULTRASOUND, ENDOSCOPIC, UPPER GI TRACT;  Surgeon: Joss May MD;  Location: [facility] ENDO;  Service: Endoscopy;  Laterality: Left;  Linear scope    ESOPHAGOGASTRODUODENOSCOPY N/A 9/11/2018    Procedure: EGD (ESOPHAGOGASTRODUODENOSCOPY);  Surgeon: Joss May MD;  Location: [facility] ENDO;  Service: Endoscopy;  Laterality: N/A;    EXTERNAL FIXATION TIBIAL FRACTURE  07/05/2016    HERNIA REPAIR      LEFT HEART CATHETERIZATION Left 10/22/2018    Procedure: Left heart cath;  Surgeon: Kevin Huizar MD;  Location: [facility] CATH;  Service: Cardiovascular;  Laterality: Left;  "   ORIF TIBIA FRACTURE  07/15/2016    PANCREAS SURGERY  10/31/2018    PARTIAL HIP ARTHROPLASTY      PROSTATE BIOPSY         Social History     Socioeconomic History    Marital status:      Spouse name: Not on file    Number of children: Not on file    Years of education: Not on file    Highest education level: Not on file   Occupational History    Not on file   Social Needs    Financial resource strain: Not on file    Food insecurity     Worry: Not on file     Inability: Not on file    Transportation needs     Medical: Not on file     Non-medical: Not on file   Tobacco Use    Smoking status: Current Every Day Smoker     Packs/day: 1.50     Years: 60.00     Pack years: 90.00    Smokeless tobacco: Never Used   Substance and Sexual Activity    Alcohol use: No     Alcohol/week: 0.0 standard drinks    Drug use: No    Sexual activity: Not on file   Lifestyle    Physical activity     Days per week: Not on file     Minutes per session: Not on file    Stress: Not on file   Relationships    Social connections     Talks on phone: Not on file     Gets together: Not on file     Attends Jew service: Not on file     Active member of club or organization: Not on file     Attends meetings of clubs or organizations: Not on file     Relationship status: Not on file   Other Topics Concern    Not on file   Social History Narrative    Not on file         Medications/Allergies: See med card    Vitals:    07/29/20 1016   BP: 121/63   Pulse: 71   Resp: 18   Weight: 61.2 kg (135 lb)   Height: 5' 10" (1.778 m)   PainSc: 10-Worst pain ever   PainLoc: Back         Physical exam:  Gen: A and O x3, pleasant, well-groomed  Skin: No rashes or obvious lesions  HEENT: PERRLA, no obvious deformities on ears or in canals. Trachea midline.  CVS: Regular rate and rhythm, normal palpable pulses.  Resp:No increased work of breathing, symmetrical chest rise.  Abdomen: Soft, NT/ND.  Musculoskeletal:  Presents in a " wheelchair, able to stand but difficulty with balance.    Neuro:  Lower extremities: 5/5 strength bilaterally, except for hip flexion 4/5 bilaterally  Reflexes: Patellar 1+, Achilles 1+ bilaterally.  Sensory:  Intact and symmetrical to light touch and pinprick in L2-S1 dermatomes bilaterally.    Lumbar spine:  Lumbar spine:  Range of motion severely reduced with both flexion and extension with increased pain especially on extension.  Jamir's test deferred  Supine straight leg raise is negative bilaterally.    Internal and external rotation of the hip causes no increased pain on either side.  Myofascial exam:  There is pain with percussion over the lower lumbar region, pain with pressure over the lower spinous processes.    Imagin2020 CT abdomen and pelvis renal stone protocol:  This is a report only but mentions a moderate compression fracture at L3 with slight retropulsion.  Unclear age.    Assessment:  The patient is an 80-year-old man with a history of pancreatic cancer, tobacco use who presents in referral from Dr. Rajeev Oliver for back pain.    1. Closed compression fracture of third lumbar vertebra, initial encounter     2. Dorsalgia, unspecified  MRI Lumbar Spine Without Contrast   3. History of pancreatic cancer         Plan:  1.  We discussed that he has had pain for 2 months, this is somewhat unusual for compression fracture, I would expected that this would have ease slightly.  We discussed that he may have incomplete healing or the pain may not be coming from the fracture but more from possible stenosis at that level due to retropulsion or other degenerative processes.  We are going to get an MRI and I will call him with the results.  We are going to try to get this today.  He does have some weakness with hip flexion I am not sure if that is because of deconditioning or if this has something to do with L3 impingement.  2.  He requested stronger pain medication, I have given him Percocet  7.5/325 but also explained that the pain medication is not going to completely relieve his symptoms and I suspect that the reason why the pain medication is not working is more because he has developed tolerance from taking this not because the pain is worsening.    Thank you for referring this interesting patient, and I look forward to continuing to collaborate in his care.

## 2020-07-30 ENCOUNTER — TELEPHONE (OUTPATIENT)
Dept: PAIN MEDICINE | Facility: CLINIC | Age: 80
End: 2020-07-30

## 2020-07-30 DIAGNOSIS — M48.56XA COMPRESSION FRACTURE OF LUMBAR VERTEBRA, NON-TRAUMATIC, INITIAL ENCOUNTER: ICD-10-CM

## 2020-07-30 DIAGNOSIS — S32.000A COMPRESSION FRACTURE OF LUMBAR VERTEBRA, INITIAL ENCOUNTER, UNSPECIFIED LUMBAR VERTEBRAL LEVEL: Primary | ICD-10-CM

## 2020-07-30 RX ORDER — SODIUM CHLORIDE, SODIUM LACTATE, POTASSIUM CHLORIDE, CALCIUM CHLORIDE 600; 310; 30; 20 MG/100ML; MG/100ML; MG/100ML; MG/100ML
INJECTION, SOLUTION INTRAVENOUS CONTINUOUS
Status: CANCELLED | OUTPATIENT
Start: 2020-08-03

## 2020-07-30 NOTE — TELEPHONE ENCOUNTER
----- Message from Nicole Rojas sent at 7/30/2020 11:40 AM CDT -----  Contact: Kika De Oliveira (Spouse)  Kika De Oliveira (Spouse) state that she is needing to speak to the office concerning pt upcoming surgery. She states she would  prefer to speak to nurse Hernandez...[phone] (home)

## 2020-07-31 ENCOUNTER — LAB VISIT (OUTPATIENT)
Dept: FAMILY MEDICINE | Facility: CLINIC | Age: 80
End: 2020-07-31
Payer: MEDICARE

## 2020-07-31 ENCOUNTER — ANESTHESIA EVENT (OUTPATIENT)
Dept: SURGERY | Facility: HOSPITAL | Age: 80
End: 2020-07-31
Payer: MEDICARE

## 2020-07-31 DIAGNOSIS — Z13.9 SCREENING PROCEDURE: ICD-10-CM

## 2020-07-31 PROCEDURE — U0003 INFECTIOUS AGENT DETECTION BY NUCLEIC ACID (DNA OR RNA); SEVERE ACUTE RESPIRATORY SYNDROME CORONAVIRUS 2 (SARS-COV-2) (CORONAVIRUS DISEASE [COVID-19]), AMPLIFIED PROBE TECHNIQUE, MAKING USE OF HIGH THROUGHPUT TECHNOLOGIES AS DESCRIBED BY CMS-2020-01-R: HCPCS

## 2020-08-01 LAB — SARS-COV-2 RNA RESP QL NAA+PROBE: NOT DETECTED

## 2020-08-03 ENCOUNTER — HOSPITAL ENCOUNTER (OUTPATIENT)
Dept: RADIOLOGY | Facility: HOSPITAL | Age: 80
Discharge: HOME | End: 2020-08-03
Attending: ANESTHESIOLOGY
Payer: MEDICARE

## 2020-08-03 ENCOUNTER — ANESTHESIA (OUTPATIENT)
Dept: SURGERY | Facility: HOSPITAL | Age: 80
End: 2020-08-03
Payer: MEDICARE

## 2020-08-03 ENCOUNTER — HOSPITAL ENCOUNTER (OUTPATIENT)
Facility: HOSPITAL | Age: 80
Discharge: HOME | End: 2020-08-03
Attending: ANESTHESIOLOGY | Admitting: ANESTHESIOLOGY
Payer: MEDICARE

## 2020-08-03 ENCOUNTER — TELEPHONE (OUTPATIENT)
Dept: PAIN MEDICINE | Facility: CLINIC | Age: 80
End: 2020-08-03

## 2020-08-03 DIAGNOSIS — S32.030A CLOSED COMPRESSION FRACTURE OF THIRD LUMBAR VERTEBRA, INITIAL ENCOUNTER: ICD-10-CM

## 2020-08-03 DIAGNOSIS — S32.030G CLOSED COMPRESSION FRACTURE OF L3 LUMBAR VERTEBRA WITH DELAYED HEALING, SUBSEQUENT ENCOUNTER: ICD-10-CM

## 2020-08-03 DIAGNOSIS — M48.56XA COMPRESSION FRACTURE OF LUMBAR VERTEBRA, NON-TRAUMATIC, INITIAL ENCOUNTER: Primary | ICD-10-CM

## 2020-08-03 DIAGNOSIS — S32.000A COMPRESSION FRACTURE OF LUMBAR VERTEBRA, INITIAL ENCOUNTER, UNSPECIFIED LUMBAR VERTEBRAL LEVEL: ICD-10-CM

## 2020-08-03 LAB — GLUCOSE SERPL-MCNC: 140 MG/DL (ref 70–110)

## 2020-08-03 PROCEDURE — 36000706: Mod: PO | Performed by: ANESTHESIOLOGY

## 2020-08-03 PROCEDURE — D9220A PRA ANESTHESIA: ICD-10-PCS | Mod: ANES,,, | Performed by: ANESTHESIOLOGY

## 2020-08-03 PROCEDURE — 71000015 HC POSTOP RECOV 1ST HR: Mod: PO | Performed by: ANESTHESIOLOGY

## 2020-08-03 PROCEDURE — D9220A PRA ANESTHESIA: Mod: ANES,,, | Performed by: ANESTHESIOLOGY

## 2020-08-03 PROCEDURE — D9220A PRA ANESTHESIA: ICD-10-PCS | Mod: CRNA,,, | Performed by: NURSE ANESTHETIST, CERTIFIED REGISTERED

## 2020-08-03 PROCEDURE — 71000033 HC RECOVERY, INTIAL HOUR: Mod: PO | Performed by: ANESTHESIOLOGY

## 2020-08-03 PROCEDURE — D9220A PRA ANESTHESIA: Mod: CRNA,,, | Performed by: NURSE ANESTHETIST, CERTIFIED REGISTERED

## 2020-08-03 PROCEDURE — 22515 PR PERQ VERTEBRAL AUGMENTATION UNI/BILAT EA ADDL THORACIC/LUMBAR: ICD-10-PCS | Mod: ,,, | Performed by: ANESTHESIOLOGY

## 2020-08-03 PROCEDURE — 37000008 HC ANESTHESIA 1ST 15 MINUTES: Mod: PO | Performed by: ANESTHESIOLOGY

## 2020-08-03 PROCEDURE — 25000003 PHARM REV CODE 250: Mod: PO | Performed by: ANESTHESIOLOGY

## 2020-08-03 PROCEDURE — 27201423 OPTIME MED/SURG SUP & DEVICES STERILE SUPPLY: Mod: PO | Performed by: ANESTHESIOLOGY

## 2020-08-03 PROCEDURE — 63600175 PHARM REV CODE 636 W HCPCS: Mod: PO | Performed by: ANESTHESIOLOGY

## 2020-08-03 PROCEDURE — 63600175 PHARM REV CODE 636 W HCPCS: Mod: PO | Performed by: NURSE ANESTHETIST, CERTIFIED REGISTERED

## 2020-08-03 PROCEDURE — C1726 CATH, BAL DIL, NON-VASCULAR: HCPCS | Mod: PO | Performed by: ANESTHESIOLOGY

## 2020-08-03 PROCEDURE — C1713 ANCHOR/SCREW BN/BN,TIS/BN: HCPCS | Mod: PO | Performed by: ANESTHESIOLOGY

## 2020-08-03 PROCEDURE — 76000 FLUOROSCOPY <1 HR PHYS/QHP: CPT | Mod: TC,PO

## 2020-08-03 PROCEDURE — 36000707: Mod: PO | Performed by: ANESTHESIOLOGY

## 2020-08-03 PROCEDURE — 22514 PR PERQ VERTEBRAL AUGMENTATION UNI/BILAT LUMBAR: ICD-10-PCS | Mod: ,,, | Performed by: ANESTHESIOLOGY

## 2020-08-03 PROCEDURE — 22514 PERQ VERTEBRAL AUGMENTATION: CPT | Mod: ,,, | Performed by: ANESTHESIOLOGY

## 2020-08-03 PROCEDURE — 37000009 HC ANESTHESIA EA ADD 15 MINS: Mod: PO | Performed by: ANESTHESIOLOGY

## 2020-08-03 PROCEDURE — 22515 PERQ VERTEBRAL AUGMENTATION: CPT | Mod: ,,, | Performed by: ANESTHESIOLOGY

## 2020-08-03 DEVICE — CEMENT AUTOPLEX KIT W/VERTEPLE: Type: IMPLANTABLE DEVICE | Site: BACK | Status: FUNCTIONAL

## 2020-08-03 RX ORDER — OXYCODONE HYDROCHLORIDE 5 MG/1
5 TABLET ORAL
Status: DISCONTINUED | OUTPATIENT
Start: 2020-08-03 | End: 2020-08-03 | Stop reason: HOSPADM

## 2020-08-03 RX ORDER — FENTANYL CITRATE 50 UG/ML
INJECTION, SOLUTION INTRAMUSCULAR; INTRAVENOUS
Status: DISCONTINUED | OUTPATIENT
Start: 2020-08-03 | End: 2020-08-03

## 2020-08-03 RX ORDER — CEFAZOLIN SODIUM 1 G/3ML
INJECTION, POWDER, FOR SOLUTION INTRAMUSCULAR; INTRAVENOUS
Status: DISCONTINUED | OUTPATIENT
Start: 2020-08-03 | End: 2020-08-03

## 2020-08-03 RX ORDER — PROPOFOL 10 MG/ML
VIAL (ML) INTRAVENOUS CONTINUOUS PRN
Status: DISCONTINUED | OUTPATIENT
Start: 2020-08-03 | End: 2020-08-03

## 2020-08-03 RX ORDER — LIDOCAINE HYDROCHLORIDE 10 MG/ML
INJECTION, SOLUTION EPIDURAL; INFILTRATION; INTRACAUDAL; PERINEURAL
Status: DISCONTINUED | OUTPATIENT
Start: 2020-08-03 | End: 2020-08-03 | Stop reason: HOSPADM

## 2020-08-03 RX ORDER — OXYCODONE AND ACETAMINOPHEN 7.5; 325 MG/1; MG/1
1 TABLET ORAL EVERY 6 HOURS PRN
Qty: 24 TABLET | Refills: 0 | Status: SHIPPED | OUTPATIENT
Start: 2020-08-03 | End: 2020-08-26

## 2020-08-03 RX ORDER — DEXAMETHASONE SODIUM PHOSPHATE 4 MG/ML
8 INJECTION, SOLUTION INTRA-ARTICULAR; INTRALESIONAL; INTRAMUSCULAR; INTRAVENOUS; SOFT TISSUE
Status: COMPLETED | OUTPATIENT
Start: 2020-08-03 | End: 2020-08-03

## 2020-08-03 RX ORDER — SODIUM CHLORIDE, SODIUM LACTATE, POTASSIUM CHLORIDE, CALCIUM CHLORIDE 600; 310; 30; 20 MG/100ML; MG/100ML; MG/100ML; MG/100ML
INJECTION, SOLUTION INTRAVENOUS CONTINUOUS
Status: DISCONTINUED | OUTPATIENT
Start: 2020-08-03 | End: 2020-08-03 | Stop reason: HOSPADM

## 2020-08-03 RX ORDER — FENTANYL CITRATE 50 UG/ML
25 INJECTION, SOLUTION INTRAMUSCULAR; INTRAVENOUS EVERY 5 MIN PRN
Status: DISCONTINUED | OUTPATIENT
Start: 2020-08-03 | End: 2020-08-03 | Stop reason: HOSPADM

## 2020-08-03 RX ORDER — CEFAZOLIN SODIUM 2 G/50ML
2 SOLUTION INTRAVENOUS
Status: COMPLETED | OUTPATIENT
Start: 2020-08-03 | End: 2020-08-03

## 2020-08-03 RX ORDER — HYDROMORPHONE HYDROCHLORIDE 2 MG/ML
0.2 INJECTION, SOLUTION INTRAMUSCULAR; INTRAVENOUS; SUBCUTANEOUS EVERY 5 MIN PRN
Status: DISCONTINUED | OUTPATIENT
Start: 2020-08-03 | End: 2020-08-03 | Stop reason: HOSPADM

## 2020-08-03 RX ORDER — LIDOCAINE HYDROCHLORIDE 10 MG/ML
1 INJECTION, SOLUTION EPIDURAL; INFILTRATION; INTRACAUDAL; PERINEURAL ONCE
Status: DISCONTINUED | OUTPATIENT
Start: 2020-08-03 | End: 2020-08-03 | Stop reason: HOSPADM

## 2020-08-03 RX ORDER — LIDOCAINE HYDROCHLORIDE 20 MG/ML
INJECTION INTRAVENOUS
Status: DISCONTINUED | OUTPATIENT
Start: 2020-08-03 | End: 2020-08-03

## 2020-08-03 RX ORDER — PROPOFOL 10 MG/ML
VIAL (ML) INTRAVENOUS
Status: DISCONTINUED | OUTPATIENT
Start: 2020-08-03 | End: 2020-08-03

## 2020-08-03 RX ADMIN — SODIUM CHLORIDE, SODIUM LACTATE, POTASSIUM CHLORIDE, AND CALCIUM CHLORIDE: .6; .31; .03; .02 INJECTION, SOLUTION INTRAVENOUS at 09:08

## 2020-08-03 RX ADMIN — CEFAZOLIN SODIUM 2 G: 2 SOLUTION INTRAVENOUS at 10:08

## 2020-08-03 RX ADMIN — PROPOFOL 50 MCG/KG/MIN: 10 INJECTION, EMULSION INTRAVENOUS at 10:08

## 2020-08-03 RX ADMIN — LIDOCAINE HYDROCHLORIDE 100 MG: 20 INJECTION, SOLUTION INTRAVENOUS at 10:08

## 2020-08-03 RX ADMIN — DEXAMETHASONE SODIUM PHOSPHATE 8 MG: 4 INJECTION, SOLUTION INTRAMUSCULAR; INTRAVENOUS at 09:08

## 2020-08-03 RX ADMIN — PROPOFOL 20 MG: 10 INJECTION, EMULSION INTRAVENOUS at 10:08

## 2020-08-03 RX ADMIN — CEFAZOLIN 2 G: 1 INJECTION, POWDER, FOR SOLUTION INTRAVENOUS at 10:08

## 2020-08-03 RX ADMIN — FENTANYL CITRATE 25 MCG: 50 INJECTION, SOLUTION INTRAMUSCULAR; INTRAVENOUS at 10:08

## 2020-08-03 NOTE — OP NOTE
PROCEDURE PERFORMED: Transpedicular Kyphoplasty at the L2 and L3 level(s), from the right side                                                                               PREPROCEDURE DIAGNOSIS:                                                      1.  Wedge compression fracture of the L2 and L3 lumbar vertebra         2.  Pain at these levels of compression fracture.                            3.  No myelopathy or retropulsed fragments.                                                                                                             POSTPROCEDURE DIAGNOSIS:   Same    SURGEON:  Galindo Kimbrough MD     Assistant:  None                                                                                                                                                                               Anesthesia: MAC per anesthesia                                                                                                              LOCAL ANESTHETIC USED:  Lidocaine 1% at each side of vertebra, 3 ml at each level                                                                               ESTIMATED BLOOD LOSS: <5ml                                                                                                                        COMPLICATIONS: none                                                                                                                                 BONE MARROW BIOPSY:  none                                                                               TECHNIQUE: A time out was taken to identify patient, procedure and side, and allergies were reviewed. With the patient lying in the prone position and after receiving the intravenous sedation, the area was prepped and draped using the usual sterile fashion, using ChloraPrep and a body fenestrated drape. The area of the compression fracture(s) was determined under fluoroscopic guidance using the AP and lateral views.  Local anesthetic  was given with a 25-gauge 1.5 inch needle.  That was followed with completing the local anesthetic injection with a 3.5inch 22-gauge spinal needle going down all the way to the periosteum of the desired pedicle. A 2-mm longitudinal incision was made around the 22-gauge spinal needle to allow introduction of the trocar and cannula to the vertebral body.  Through a  transpedicular approach, the trocar and cannula were introduced and advanced slowly.  Once in the posterior one-third of the vertebral body, a drill was placed and advanced to the anterior one-third of the vertebral body. This was performed from the right at each level.  Kyphon balloons were then placed in each trochar and inflated under live fluoroscopy until noted to be filling the vertebral body, no height change noted, no movement of the end plates noted. The methylmethacrylate resin was mixed and approximately 3.5 ml was injected at each level through their respective trocars. No escape was observed while introducing the cement and this was done under live fluoroscopy.  The cannula was applied to each trocar under live fluoroscopy as well.  Each trocar and cannula was removed under live fluoroscopy in a very slow fashion to observe the contrast-impregnated cement. After the trocars were removed, the sites were cleaned and dried. Dermabond was then used to close the incisions.                                                                                 The patient was monitored after the procedure with no change in neuromuscular status. The patient was given post-procedure and discharge instructions at home.  Instruction regarding bandage were given.  The patient was advised to call if developing any severe pain neurologic changes. Otherwise the patient will follow up with us in 1 week at our clinic.

## 2020-08-03 NOTE — DISCHARGE INSTRUCTIONS
Discharge Instructions for Kyphoplasty  Fractures in the bones of the spine (vertebrae) can cause severe back pain and loss of movement. You had a procedure called kyphoplasty to cement the fractures in your spine, restore the height of the vertebrae, and help relieve pain. Using image-guided X-rays, your doctor made 1 or 2 small cuts (incisions) in your back for each vertebra treated. The doctor put a balloon on each side of the broken vertebra and inflated the balloons until they expanded the right amount. Then the balloons were removed. The spaces created by the balloons were filled with orthopedic cement. This gave strength and stability to your vertebra. The following are instructions to help you care for your back when you are at home.  Home care  · Take your medicine exactly as directed.  · Remove the small bandages on your incision 24 to 48 hours after the surgery.  · Dont shower or soak in a bathtub for 1 to 2 days after the surgery.  · Use an ice pack or bag of frozen peas--or something similar--wrapped in a thin towel to reduce the swelling and pain around incision sites. Put the ice pack on the area for 20 minutes, then remove it for 20 minutes. Repeat as needed.  · Wear your brace, if you were told to do so by your doctor. And to help stay flexible, bend as much as the brace allows you to.  · For the first 1 to 2 days after the surgery, keep your head raised up when you are lying down.  · Take short walks. Start by walking for 5 minutes at a time. Then gradually build up your time and distance.  · Dont drive for 2 days after surgery. And never drive while taking opioid pain medicine.  · Ask your healthcare provider when you can begin lifting objects again. Ask him or her about any weight limits for lifting.  Follow-up  Make a follow-up appointment as directed by your doctor.     When to seek medical attention  Call 911 right away if you have any of the following:  · Chest pain  · Shortness of  breath  Otherwise, call your doctor right away if you have any of the following:  · Increased redness, swelling, drainage, or warmth around the incision sites  · Severe pain at the incision site  · Weakness, numbness, or tingling in your legs  · Fever above 100.4°F  (38.0°C) or shaking chills   Date Last Reviewed: 11/16/2015  © 6765-2856 [employer]. [address] [place] [address], [place] [address]. All rights reserved. This information is not intended as a substitute for professional medical care. Always follow your healthcare professional's instructions.

## 2020-08-03 NOTE — DISCHARGE SUMMARY
[facility]  Discharge Note  Short Stay    Admit Date: 8/3/2020    Discharge Date: 8/3/2020    Attending Physician: Galindo Kimbrough MD     Discharge Provider: Galindo Kimbrough    Diagnoses:  Active Hospital Problems    Diagnosis  POA    *Compression fracture of lumbar vertebra, non-traumatic, initial encounter [M48.56XA]  Yes      Resolved Hospital Problems   No resolved problems to display.       Discharged Condition: good    Final Diagnoses: Compression fracture of lumbar vertebra, initial encounter, unspecified lumbar vertebral level [S32.000A]    Disposition: Home or Self Care    Hospital Course: no complications, uneventful    Outcome of Hospitalization, Treatment, Procedure, or Surgery:  Patient was admitted for outpatient procedure. The patient underwent procedure without complications and are discharged home    Follow up/Patient Instructions:  Follow up as scheduled in Pain Management clinic in 3-4 weeks/Patient has received instructions and follow up date and time    Medications:  Continue previous medications    Discharge Procedure Orders   Call MD for:  temperature >100.4     Call MD for:  severe uncontrolled pain     Call MD for:  redness, tenderness, or signs of infection (pain, swelling, redness, odor or green/yellow discharge around incision site)     Call MD for:  severe persistent headache     No dressing needed         Discharge Procedure Orders (must include Diet, Follow-up, Activity):   Discharge Procedure Orders (must include Diet, Follow-up, Activity)   Call MD for:  temperature >100.4     Call MD for:  severe uncontrolled pain     Call MD for:  redness, tenderness, or signs of infection (pain, swelling, redness, odor or green/yellow discharge around incision site)     Call MD for:  severe persistent headache     No dressing needed

## 2020-08-03 NOTE — INTERVAL H&P NOTE
The patient has been examined and the H&P has been reviewed:    I concur with the findings and no changes have occurred since H&P was written.    Anesthesia/Surgery risks, benefits and alternative options discussed and understood by patient/family.    MRI has revealed acute compression fractures at L2 and L3      Active Hospital Problems    Diagnosis  POA    Compression fracture of lumbar vertebra, non-traumatic, initial encounter [M48.56XA]  Yes      Resolved Hospital Problems   No resolved problems to display.

## 2020-08-03 NOTE — TRANSFER OF CARE
"Anesthesia Transfer of Care Note    Patient: Bib De Oliveira     Procedure(s) Performed: Procedure(s) (LRB):  Kyphoplasty L2 and L3 (N/A)    Patient location: PACU    Anesthesia Type: MAC    Transport from OR: Transported from OR on room air with adequate spontaneous ventilation    Post pain: adequate analgesia    Post assessment: no apparent anesthetic complications    Post vital signs: stable    Level of consciousness: awake    Nausea/Vomiting: no nausea/vomiting    Complications: none    Transfer of care protocol was followed      Last vitals:   Visit Vitals  /69 (BP Location: Right arm, Patient Position: Lying)   Pulse 65   Temp 36.9 °C (98.4 °F) (Skin)   Resp 18   Ht 5' 10" (1.778 m)   Wt 56.2 kg (124 lb)   SpO2 96%   BMI 17.79 kg/m²     "

## 2020-08-03 NOTE — TELEPHONE ENCOUNTER
----- Message from Miranda Sandifer sent at 7/31/2020  1:06 PM CDT -----  Regarding: medication  Mr. Loyola came by to have his COVID testing done this afternoon. Having a procedure on Monday. His son that came with him, states he his having some issues with a medication that was prescribed to him. Mr. Loyola ask if someone call please call [phone]    Thanks   Franny

## 2020-08-03 NOTE — ANESTHESIA PREPROCEDURE EVALUATION
08/03/2020  Bib De Oliveira Sr. is a 80 y.o., male.    Anesthesia Evaluation    I have reviewed the Patient Summary Reports.    I have reviewed the Nursing Notes. I have reviewed the NPO Status.   I have reviewed the Medications.     Review of Systems  Hematology/Oncology:         -- Anemia: Hematology Comments: Hgb 10   Cardiovascular:   Hypertension CAD   ECG has been reviewed. CORONARY ANATOMY:     The right coronary artery is a large size vessel with mild luminal irregularities with a 30% stenotic lesion in the distal RCA, otherwise no significant disease.  Distally, it gives an RPDA that is relatively small with no acute disease and RPL system that is large multi-branching with no significant disease.     Left main is very short, but no significant disease.       LAD is a large size vessel with mild luminal irregularities, but no significant disease.  Diagonal 1 is small with no disease. Diagonal 2 is a medium size vessel with no significant disease.     Left circumflex artery is a large size vessel with an ectatic segment in the mid segment, otherwise no significant disease.  It terminates on a large obtuse marginal 2 that has two branches.  OM2 is ectatic proximally, otherwise no significant disease.     Ejection fraction 50%.     IMPRESSION:  1.  False/positive stress test.  2.  Normal ejection fraction.  3.  No significant coronary artery disease.   Pulmonary:  Pulmonary Normal    Renal/:   Chronic Renal Disease, CRI    Musculoskeletal:   Compression fxrs Spine Disorders: lumbar    Neurological:  Neurology Normal    Endocrine:   Diabetes        Physical Exam  General:  Cachexia    Airway/Jaw/Neck:  Airway Findings: Mouth Opening: Normal Tongue: Normal  General Airway Assessment: Adult  Mallampati: III  Improves to II with phonation.      Dental:  Dental Findings: In tact    Chest/Lungs:  Chest/Lungs Findings: Clear to auscultation, Normal Respiratory Rate         Mental Status:  Mental Status Findings:  Cooperative, Alert and Oriented  In pain       Anesthesia Plan  Type of Anesthesia, risks & benefits discussed:  Anesthesia Type:  general  Patient's Preference:   Intra-op Monitoring Plan: standard ASA monitors  Intra-op Monitoring Plan Comments:   Post Op Pain Control Plan:   Post Op Pain Control Plan Comments:   Induction:   IV  Beta Blocker:  Patient is not currently on a Beta-Blocker (No further documentation required).       Informed Consent: Patient understands risks and agrees with Anesthesia plan.  Questions answered. Anesthesia consent signed with patient.  ASA Score: 3     Day of Surgery Review of History & Physical: I have interviewed and examined the patient. I have reviewed the patient's H&P dated:            Ready For Surgery From Anesthesia Perspective.

## 2020-08-03 NOTE — ANESTHESIA POSTPROCEDURE EVALUATION
Anesthesia Post Evaluation    Patient: Bib De Oliveira Sr.    Procedure(s) Performed: Procedure(s) (LRB):  Kyphoplasty L2 and L3 (N/A)    Final Anesthesia Type: general    Patient location during evaluation: PACU  Patient participation: Yes- Able to Participate  Level of consciousness: sedated and awake  Post-procedure vital signs: reviewed and stable  Pain management: adequate  Airway patency: patent    PONV status at discharge: No PONV  Anesthetic complications: no      Cardiovascular status: blood pressure returned to baseline  Respiratory status: spontaneous ventilation  Hydration status: euvolemic  Follow-up not needed.          Vitals Value Taken Time   /74 08/03/20 1141   Temp 36.4 °C (97.5 °F) 08/03/20 1116   Pulse 74 08/03/20 1141   Resp 16 08/03/20 1141   SpO2 98 % 08/03/20 1141         Event Time   Out of Recovery 11:17:00         Pain/Loki Score: Loki Score: 10 (8/3/2020 11:41 AM)

## 2020-08-04 ENCOUNTER — DOCUMENT SCAN (OUTPATIENT)
Dept: HOME HEALTH SERVICES | Facility: HOSPITAL | Age: 80
End: 2020-08-04
Payer: MEDICARE

## 2020-08-04 VITALS
SYSTOLIC BLOOD PRESSURE: 120 MMHG | WEIGHT: 124 LBS | OXYGEN SATURATION: 98 % | TEMPERATURE: 98 F | BODY MASS INDEX: 17.75 KG/M2 | HEIGHT: 70 IN | RESPIRATION RATE: 16 BRPM | DIASTOLIC BLOOD PRESSURE: 74 MMHG | HEART RATE: 74 BPM

## 2020-08-10 ENCOUNTER — OFFICE VISIT (OUTPATIENT)
Dept: PAIN MEDICINE | Facility: CLINIC | Age: 80
End: 2020-08-10
Payer: MEDICARE

## 2020-08-10 ENCOUNTER — TELEPHONE (OUTPATIENT)
Dept: PAIN MEDICINE | Facility: CLINIC | Age: 80
End: 2020-08-10

## 2020-08-10 ENCOUNTER — HOSPITAL ENCOUNTER (OUTPATIENT)
Dept: RADIOLOGY | Facility: HOSPITAL | Age: 80
Discharge: HOME | End: 2020-08-10
Attending: ANESTHESIOLOGY
Payer: MEDICARE

## 2020-08-10 VITALS
BODY MASS INDEX: 17.75 KG/M2 | WEIGHT: 124 LBS | HEART RATE: 67 BPM | RESPIRATION RATE: 18 BRPM | DIASTOLIC BLOOD PRESSURE: 79 MMHG | SYSTOLIC BLOOD PRESSURE: 156 MMHG | HEIGHT: 70 IN

## 2020-08-10 DIAGNOSIS — S32.000A COMPRESSION FRACTURE OF LUMBAR VERTEBRA, INITIAL ENCOUNTER, UNSPECIFIED LUMBAR VERTEBRAL LEVEL: Primary | ICD-10-CM

## 2020-08-10 DIAGNOSIS — S32.000A COMPRESSION FRACTURE OF LUMBAR VERTEBRA, INITIAL ENCOUNTER, UNSPECIFIED LUMBAR VERTEBRAL LEVEL: ICD-10-CM

## 2020-08-10 PROCEDURE — 99999 PR PBB SHADOW E&M-EST. PATIENT-LVL III: ICD-10-PCS | Mod: PBBFAC,,, | Performed by: ANESTHESIOLOGY

## 2020-08-10 PROCEDURE — 72131 CT LUMBAR SPINE W/O DYE: CPT | Mod: TC,PO

## 2020-08-10 PROCEDURE — 99213 OFFICE O/P EST LOW 20 MIN: CPT | Mod: PBBFAC,25,PN | Performed by: ANESTHESIOLOGY

## 2020-08-10 PROCEDURE — 99024 POSTOP FOLLOW-UP VISIT: CPT | Mod: POP,,, | Performed by: ANESTHESIOLOGY

## 2020-08-10 PROCEDURE — 99999 PR PBB SHADOW E&M-EST. PATIENT-LVL III: CPT | Mod: PBBFAC,,, | Performed by: ANESTHESIOLOGY

## 2020-08-10 PROCEDURE — 72131 CT LUMBAR SPINE WITHOUT CONTRAST: ICD-10-PCS | Mod: 26,,, | Performed by: RADIOLOGY

## 2020-08-10 PROCEDURE — 72131 CT LUMBAR SPINE W/O DYE: CPT | Mod: 26,,, | Performed by: RADIOLOGY

## 2020-08-10 PROCEDURE — 99024 PR POST-OP FOLLOW-UP VISIT: ICD-10-PCS | Mod: POP,,, | Performed by: ANESTHESIOLOGY

## 2020-08-10 RX ORDER — HYDROMORPHONE HYDROCHLORIDE 2 MG/1
2 TABLET ORAL EVERY 6 HOURS PRN
Qty: 20 TABLET | Refills: 0 | Status: SHIPPED | OUTPATIENT
Start: 2020-08-10 | End: 2020-08-19 | Stop reason: SDUPTHER

## 2020-08-10 NOTE — PROGRESS NOTES
This note was completed with dictation software and grammatical errors may exist.    CC:  Back pain    HPI:  The patient is an 80-year-old man with a history of pancreatic cancer, tobacco use who presents in referral from Dr. Rajeev Oliver for back pain.  He returns in follow-up today for compression fractures, he is status post L2 and L3 kyphoplasty on 08/03/2020.  He reports that for 2 days he was actually doing very well, reports that his pain scale had gone from 10/10 down to 2/10.  He has home health and they were having him get up in walk in doing some exercises.  However, he was getting up out of bed and it sounds like his wife was helping him out of bed when he sat up and had sudden severe pain.  He reports is located in the right side of his back, worse with any movement.  He states that now his pain is a 10/10, does not get any relief with pain medication.  He denies any radiation into the legs.  He feels like the Percocet only helped for about 1 hour.  His wife states that he is in constant pain, having a great deal of difficulty doing anything on his own his wife is having to take care of him.    Previous history:  The patient reports that 2 months ago he was lifting a trailer and felt a sudden back pain.  Since then he has had continued severe back pain.  Is located in the low back and into the sacrum.  He states that it radiates out to the side slightly but denies any radiation into the legs.  He describes it as burning, throbbing, grabbing, tight, tingling, numb, sharp, electric shooting and hot and cold.  It is worse with prolonged sitting or standing, doing any walking, bending, flexing, lifting and with getting out of a bed or a chair, causes difficulty with trying to stand up, he feels weak in the legs.  He has been mostly sitting in a wheelchair, not walking much on his own, having difficulty timing issues and getting dressed.  He gets some relief with lying down and with medications.  When  he 1st was injured, this was diagnosis of possible muscle strain, he continued to have severe pain however.  He ended up developing what sounds like a urinary tract infection and had been admitted to a hospital where they found renal calculi but on a CT scan they also noted an L3 compression fracture of unclear age but he did not have this several months prior according to other imaging of that area.    Pain intervention history: He is status post L2 and L3 kyphoplasty on 08/03/2020.  Antineuropathics:  NSAIDs:  Physical therapy:  Antidepressants:  Muscle relaxers:  Opioids: He has been taking hydrocodone 7.5/325 every 6 hours and states that it does not help much.  He was actually started on tramadol, went to 5 milligram hydrocodone and eventually on 7.5 and he states that it does nothing.  Antiplatelets/Anticoagulants:        ROS:  He reports weight loss, vision change, eye pain, cough, appetite change, diarrhea, easy bruising, urinary urgency, renal calculi, loss of balance.  Balance of review systems is negative.    Lab Results   Component Value Date    LABA1C 7.1 01/06/2020    HGBA1C 7.5 (H) 04/20/2020       Lab Results   Component Value Date    WBC 6.74 07/17/2020    HGB 10.7 (L) 07/17/2020    HCT 32.2 (L) 07/17/2020    MCV 89 07/17/2020     07/17/2020             Past Medical History:   Diagnosis Date    Abnormal PSA     Basal cell carcinoma of left ear 12/2/2015    Closed fracture of left tibial plateau with routine healing s/p ORIF on 7/15/2016 7/5/2016    Coronary artery disease (CAD) excluded 10/22/2018    DVT (deep venous thrombosis)     Essential hypertension, benign 12/2/2015    Hyperlipidemia     Pancreatic cancer 10/2018    Thyroid disease     Type 2 diabetes mellitus with complication 12/2/2015       Past Surgical History:   Procedure Laterality Date    BASAL CELL CARCINOMA EXCISION  03/17/2017    BLADDER SURGERY      COLONOSCOPY N/A 9/7/2018    Procedure: COLONOSCOPY;  Surgeon:  Olu Ahn MD;  Location: [facility] ENDO;  Service: Endoscopy;  Laterality: N/A;    CORONARY ANGIOGRAPHY N/A 10/22/2018    Procedure: ANGIOGRAM, CORONARY ARTERY;  Surgeon: Kevin Huizar MD;  Location: [facility] CATH;  Service: Cardiovascular;  Laterality: N/A;    ENDOSCOPIC ULTRASOUND OF UPPER GASTROINTESTINAL TRACT Left 9/11/2018    Procedure: ULTRASOUND, ENDOSCOPIC, UPPER GI TRACT;  Surgeon: Joss May MD;  Location: [facility] ENDO;  Service: Endoscopy;  Laterality: Left;  Linear scope    ESOPHAGOGASTRODUODENOSCOPY N/A 9/11/2018    Procedure: EGD (ESOPHAGOGASTRODUODENOSCOPY);  Surgeon: Joss May MD;  Location: [facility];  Service: Endoscopy;  Laterality: N/A;    EXTERNAL FIXATION TIBIAL FRACTURE  07/05/2016    FIXATION KYPHOPLASTY N/A 8/3/2020    Procedure: Kyphoplasty L2 and L3;  Surgeon: Galindo Kimbrough MD;  Location: [facility] OR;  Service: Pain Management;  Laterality: N/A;    HERNIA REPAIR      LEFT HEART CATHETERIZATION Left 10/22/2018    Procedure: Left heart cath;  Surgeon: Kevin Huizar MD;  Location: [facility];  Service: Cardiovascular;  Laterality: Left;    ORIF TIBIA FRACTURE  07/15/2016    PANCREAS SURGERY  10/31/2018    PARTIAL HIP ARTHROPLASTY      PROSTATE BIOPSY         Social History     Socioeconomic History    Marital status:      Spouse name: Not on file    Number of children: Not on file    Years of education: Not on file    Highest education level: Not on file   Occupational History    Not on file   Social Needs    Financial resource strain: Not on file    Food insecurity     Worry: Not on file     Inability: Not on file    Transportation needs     Medical: Not on file     Non-medical: Not on file   Tobacco Use    Smoking status: Current Every Day Smoker     Packs/day: 2.00     Years: 60.00     Pack years: 120.00    Smokeless tobacco: Never Used   Substance and Sexual Activity    Alcohol use: No     Alcohol/week: 0.0 standard drinks    Drug  "use: No    Sexual activity: Not on file   Lifestyle    Physical activity     Days per week: Not on file     Minutes per session: Not on file    Stress: Not on file   Relationships    Social connections     Talks on phone: Not on file     Gets together: Not on file     Attends [employer] service: Not on file     Active member of club or organization: Not on file     Attends meetings of clubs or organizations: Not on file     Relationship status: Not on file   Other Topics Concern    Not on file   Social History Narrative    Not on file         Medications/Allergies: See med card    Vitals:    08/10/20 1021   BP: (!) 156/79   Pulse: 67   Resp: 18   Weight: 56.2 kg (124 lb)   Height: 5' 10" (1.778 m)   PainSc: 10-Worst pain ever   PainLoc: Back         Physical exam:  Gen: A and O x3, pleasant, well-groomed  Skin: No rashes or obvious lesions  HEENT: PERRLA, no obvious deformities on ears or in canals. Trachea midline.  CVS: Regular rate and rhythm, normal palpable pulses.  Resp:No increased work of breathing, symmetrical chest rise.  Abdomen: Soft, NT/ND.  Musculoskeletal:  Presents in a wheelchair, able to stand but difficulty with balance.    Neuro:  Lower extremities: 5/5 strength bilaterally, except for hip flexion 4/5 bilaterally  Reflexes: Patellar 1+, Achilles 1+ bilaterally.  Sensory:  Intact and symmetrical to light touch and pinprick in L2-S1 dermatomes bilaterally.    Lumbar spine:  Lumbar spine:  Range of motion severely reduced with both flexion and extension with increased pain especially on extension.  Jamir's test deferred  Supine straight leg raise is negative bilaterally.    Internal and external rotation of the hip causes no increased pain on either side.  Myofascial exam:  There is pain with percussion over the lower lumbar region, pain with pressure over the lower spinous processes but also over T12, L1.    Imagin2020 CT abdomen and pelvis renal stone protocol:  This is a report " only but mentions a moderate compression fracture at L3 with slight retropulsion.  Unclear age.    7/29/20 L-spine MRI:  T11-12: Unremarkable   T12-L1: Unremarkable   L1-2: There is only a minimal disc bulge.  There is no spinal canal or significant foraminal stenosis.   L2-3: There is a diffuse disc bulge with osteophytic ridging.  There is no spinal stenosis.  There is mild crowding of the left lateral recess.  There is moderate right and mild left foraminal stenosis.   L3-4: There is a mild diffuse disc bulge.  There is mild facet joint arthropathy.  There is no spinal stenosis.  There is mild right foraminal stenosis.   L4-5: There is a mild diffuse disc bulge and mild facet joint arthropathy.  There is no spinal stenosis.  There is mild right foraminal stenosis.   L5-S1: There is mild facet joint arthropathy.  There is a synovial cyst posterior to the right facet joint.  There is a mild disc bulge with osteophytic ridging contributing to moderate right and mild-to-moderate left foraminal stenosis.  There is no spinal stenosis.   There is edema throughout the L2 and L3 vertebral bodies.  There is approximately 20% loss of vertebral body height of L2 and 40% loss of vertebral body height at the L3 level worse towards the left.      Assessment:  The patient is an 80-year-old man with a history of pancreatic cancer, tobacco use who presents in referral from Dr. Rajeev Oliver for back pain.    1. Compression fracture of lumbar vertebra, initial encounter, unspecified lumbar vertebral level  CT Lumbar Spine Without Contrast       Plan:  1.  I suspect that he has had another fracture, most likely culprit would be the levels adjacent to L2 and L3.  I am going to get a CT scan since this would be easy to obtain and we do not need to know the age of a fracture since this would certainly be new compared to his MRI on 07/29.  I will call him with the results.  2.  He is asked for different pain medication, hydrocodone  Percocet have not been effective and he is in 10/10 pain, unable to sleep, unable to get comfortable.  I have sent a prescription for hydromorphone 2 milligrams to take every 6 hours as needed.

## 2020-08-10 NOTE — H&P (VIEW-ONLY)
This note was completed with dictation software and grammatical errors may exist.    CC:  Back pain    HPI:  The patient is an 80-year-old man with a history of pancreatic cancer, tobacco use who presents in referral from Dr. Rajeev Oliver for back pain.  He returns in follow-up today for compression fractures, he is status post L2 and L3 kyphoplasty on 08/03/2020.  He reports that for 2 days he was actually doing very well, reports that his pain scale had gone from 10/10 down to 2/10.  He has home health and they were having him get up in walk in doing some exercises.  However, he was getting up out of bed and it sounds like his wife was helping him out of bed when he sat up and had sudden severe pain.  He reports is located in the right side of his back, worse with any movement.  He states that now his pain is a 10/10, does not get any relief with pain medication.  He denies any radiation into the legs.  He feels like the Percocet only helped for about 1 hour.  His wife states that he is in constant pain, having a great deal of difficulty doing anything on his own his wife is having to take care of him.    Previous history:  The patient reports that 2 months ago he was lifting a trailer and felt a sudden back pain.  Since then he has had continued severe back pain.  Is located in the low back and into the sacrum.  He states that it radiates out to the side slightly but denies any radiation into the legs.  He describes it as burning, throbbing, grabbing, tight, tingling, numb, sharp, electric shooting and hot and cold.  It is worse with prolonged sitting or standing, doing any walking, bending, flexing, lifting and with getting out of a bed or a chair, causes difficulty with trying to stand up, he feels weak in the legs.  He has been mostly sitting in a wheelchair, not walking much on his own, having difficulty timing issues and getting dressed.  He gets some relief with lying down and with medications.  When  he 1st was injured, this was diagnosis of possible muscle strain, he continued to have severe pain however.  He ended up developing what sounds like a urinary tract infection and had been admitted to a hospital where they found renal calculi but on a CT scan they also noted an L3 compression fracture of unclear age but he did not have this several months prior according to other imaging of that area.    Pain intervention history: He is status post L2 and L3 kyphoplasty on 08/03/2020.  Antineuropathics:  NSAIDs:  Physical therapy:  Antidepressants:  Muscle relaxers:  Opioids: He has been taking hydrocodone 7.5/325 every 6 hours and states that it does not help much.  He was actually started on tramadol, went to 5 milligram hydrocodone and eventually on 7.5 and he states that it does nothing.  Antiplatelets/Anticoagulants:        ROS:  He reports weight loss, vision change, eye pain, cough, appetite change, diarrhea, easy bruising, urinary urgency, renal calculi, loss of balance.  Balance of review systems is negative.    Lab Results   Component Value Date    LABA1C 7.1 01/06/2020    HGBA1C 7.5 (H) 04/20/2020       Lab Results   Component Value Date    WBC 6.74 07/17/2020    HGB 10.7 (L) 07/17/2020    HCT 32.2 (L) 07/17/2020    MCV 89 07/17/2020     07/17/2020             Past Medical History:   Diagnosis Date    Abnormal PSA     Basal cell carcinoma of left ear 12/2/2015    Closed fracture of left tibial plateau with routine healing s/p ORIF on 7/15/2016 7/5/2016    Coronary artery disease (CAD) excluded 10/22/2018    DVT (deep venous thrombosis)     Essential hypertension, benign 12/2/2015    Hyperlipidemia     Pancreatic cancer 10/2018    Thyroid disease     Type 2 diabetes mellitus with complication 12/2/2015       Past Surgical History:   Procedure Laterality Date    BASAL CELL CARCINOMA EXCISION  03/17/2017    BLADDER SURGERY      COLONOSCOPY N/A 9/7/2018    Procedure: COLONOSCOPY;  Surgeon:  Olu Ahn MD;  Location: [facility] ENDO;  Service: Endoscopy;  Laterality: N/A;    CORONARY ANGIOGRAPHY N/A 10/22/2018    Procedure: ANGIOGRAM, CORONARY ARTERY;  Surgeon: Kevin Huizar MD;  Location: [facility] CATH;  Service: Cardiovascular;  Laterality: N/A;    ENDOSCOPIC ULTRASOUND OF UPPER GASTROINTESTINAL TRACT Left 9/11/2018    Procedure: ULTRASOUND, ENDOSCOPIC, UPPER GI TRACT;  Surgeon: Joss May MD;  Location: [facility] ENDO;  Service: Endoscopy;  Laterality: Left;  Linear scope    ESOPHAGOGASTRODUODENOSCOPY N/A 9/11/2018    Procedure: EGD (ESOPHAGOGASTRODUODENOSCOPY);  Surgeon: Joss May MD;  Location: [facility];  Service: Endoscopy;  Laterality: N/A;    EXTERNAL FIXATION TIBIAL FRACTURE  07/05/2016    FIXATION KYPHOPLASTY N/A 8/3/2020    Procedure: Kyphoplasty L2 and L3;  Surgeon: Galindo Kimbrough MD;  Location: [facility] OR;  Service: Pain Management;  Laterality: N/A;    HERNIA REPAIR      LEFT HEART CATHETERIZATION Left 10/22/2018    Procedure: Left heart cath;  Surgeon: Kevin Huizar MD;  Location: [facility];  Service: Cardiovascular;  Laterality: Left;    ORIF TIBIA FRACTURE  07/15/2016    PANCREAS SURGERY  10/31/2018    PARTIAL HIP ARTHROPLASTY      PROSTATE BIOPSY         Social History     Socioeconomic History    Marital status:      Spouse name: Not on file    Number of children: Not on file    Years of education: Not on file    Highest education level: Not on file   Occupational History    Not on file   Social Needs    Financial resource strain: Not on file    Food insecurity     Worry: Not on file     Inability: Not on file    Transportation needs     Medical: Not on file     Non-medical: Not on file   Tobacco Use    Smoking status: Current Every Day Smoker     Packs/day: 2.00     Years: 60.00     Pack years: 120.00    Smokeless tobacco: Never Used   Substance and Sexual Activity    Alcohol use: No     Alcohol/week: 0.0 standard drinks    Drug  "use: No    Sexual activity: Not on file   Lifestyle    Physical activity     Days per week: Not on file     Minutes per session: Not on file    Stress: Not on file   Relationships    Social connections     Talks on phone: Not on file     Gets together: Not on file     Attends Mosque service: Not on file     Active member of club or organization: Not on file     Attends meetings of clubs or organizations: Not on file     Relationship status: Not on file   Other Topics Concern    Not on file   Social History Narrative    Not on file         Medications/Allergies: See med card    Vitals:    08/10/20 1021   BP: (!) 156/79   Pulse: 67   Resp: 18   Weight: 56.2 kg (124 lb)   Height: 5' 10" (1.778 m)   PainSc: 10-Worst pain ever   PainLoc: Back         Physical exam:  Gen: A and O x3, pleasant, well-groomed  Skin: No rashes or obvious lesions  HEENT: PERRLA, no obvious deformities on ears or in canals. Trachea midline.  CVS: Regular rate and rhythm, normal palpable pulses.  Resp:No increased work of breathing, symmetrical chest rise.  Abdomen: Soft, NT/ND.  Musculoskeletal:  Presents in a wheelchair, able to stand but difficulty with balance.    Neuro:  Lower extremities: 5/5 strength bilaterally, except for hip flexion 4/5 bilaterally  Reflexes: Patellar 1+, Achilles 1+ bilaterally.  Sensory:  Intact and symmetrical to light touch and pinprick in L2-S1 dermatomes bilaterally.    Lumbar spine:  Lumbar spine:  Range of motion severely reduced with both flexion and extension with increased pain especially on extension.  Jamir's test deferred  Supine straight leg raise is negative bilaterally.    Internal and external rotation of the hip causes no increased pain on either side.  Myofascial exam:  There is pain with percussion over the lower lumbar region, pain with pressure over the lower spinous processes but also over T12, L1.    Imagin2020 CT abdomen and pelvis renal stone protocol:  This is a report " only but mentions a moderate compression fracture at L3 with slight retropulsion.  Unclear age.    7/29/20 L-spine MRI:  T11-12: Unremarkable   T12-L1: Unremarkable   L1-2: There is only a minimal disc bulge.  There is no spinal canal or significant foraminal stenosis.   L2-3: There is a diffuse disc bulge with osteophytic ridging.  There is no spinal stenosis.  There is mild crowding of the left lateral recess.  There is moderate right and mild left foraminal stenosis.   L3-4: There is a mild diffuse disc bulge.  There is mild facet joint arthropathy.  There is no spinal stenosis.  There is mild right foraminal stenosis.   L4-5: There is a mild diffuse disc bulge and mild facet joint arthropathy.  There is no spinal stenosis.  There is mild right foraminal stenosis.   L5-S1: There is mild facet joint arthropathy.  There is a synovial cyst posterior to the right facet joint.  There is a mild disc bulge with osteophytic ridging contributing to moderate right and mild-to-moderate left foraminal stenosis.  There is no spinal stenosis.   There is edema throughout the L2 and L3 vertebral bodies.  There is approximately 20% loss of vertebral body height of L2 and 40% loss of vertebral body height at the L3 level worse towards the left.      Assessment:  The patient is an 80-year-old man with a history of pancreatic cancer, tobacco use who presents in referral from Dr. Rajeev Oliver for back pain.    1. Compression fracture of lumbar vertebra, initial encounter, unspecified lumbar vertebral level  CT Lumbar Spine Without Contrast       Plan:  1.  I suspect that he has had another fracture, most likely culprit would be the levels adjacent to L2 and L3.  I am going to get a CT scan since this would be easy to obtain and we do not need to know the age of a fracture since this would certainly be new compared to his MRI on 07/29.  I will call him with the results.  2.  He is asked for different pain medication, hydrocodone  Percocet have not been effective and he is in 10/10 pain, unable to sleep, unable to get comfortable.  I have sent a prescription for hydromorphone 2 milligrams to take every 6 hours as needed.

## 2020-08-11 DIAGNOSIS — S22.080A COMPRESSION FRACTURE OF T12 VERTEBRA, INITIAL ENCOUNTER: Primary | ICD-10-CM

## 2020-08-11 DIAGNOSIS — M48.56XA COMPRESSION FRACTURE OF LUMBAR VERTEBRA, NON-TRAUMATIC, INITIAL ENCOUNTER: ICD-10-CM

## 2020-08-11 RX ORDER — SODIUM CHLORIDE, SODIUM LACTATE, POTASSIUM CHLORIDE, CALCIUM CHLORIDE 600; 310; 30; 20 MG/100ML; MG/100ML; MG/100ML; MG/100ML
INJECTION, SOLUTION INTRAVENOUS CONTINUOUS
Status: CANCELLED | OUTPATIENT
Start: 2020-08-12

## 2020-08-12 ENCOUNTER — HOSPITAL ENCOUNTER (OUTPATIENT)
Dept: RADIOLOGY | Facility: HOSPITAL | Age: 80
Discharge: HOME | End: 2020-08-12
Attending: ANESTHESIOLOGY
Payer: MEDICARE

## 2020-08-12 ENCOUNTER — ANESTHESIA EVENT (OUTPATIENT)
Dept: SURGERY | Facility: HOSPITAL | Age: 80
End: 2020-08-12
Payer: MEDICARE

## 2020-08-12 ENCOUNTER — ANESTHESIA (OUTPATIENT)
Dept: SURGERY | Facility: HOSPITAL | Age: 80
End: 2020-08-12
Payer: MEDICARE

## 2020-08-12 ENCOUNTER — HOSPITAL ENCOUNTER (OUTPATIENT)
Facility: HOSPITAL | Age: 80
Discharge: HOME | End: 2020-08-12
Attending: ANESTHESIOLOGY | Admitting: ANESTHESIOLOGY
Payer: MEDICARE

## 2020-08-12 DIAGNOSIS — S32.000A COMPRESSION FRACTURE OF LUMBAR VERTEBRA, INITIAL ENCOUNTER, UNSPECIFIED LUMBAR VERTEBRAL LEVEL: ICD-10-CM

## 2020-08-12 DIAGNOSIS — M48.56XA COMPRESSION FRACTURE OF LUMBAR VERTEBRA, NON-TRAUMATIC, INITIAL ENCOUNTER: ICD-10-CM

## 2020-08-12 DIAGNOSIS — S22.080A COMPRESSION FRACTURE OF T12 VERTEBRA, INITIAL ENCOUNTER: ICD-10-CM

## 2020-08-12 LAB
GLUCOSE SERPL-MCNC: 145 MG/DL (ref 70–110)
SARS-COV-2 RDRP RESP QL NAA+PROBE: NEGATIVE

## 2020-08-12 PROCEDURE — D9220A PRA ANESTHESIA: ICD-10-PCS | Mod: ANES,,, | Performed by: ANESTHESIOLOGY

## 2020-08-12 PROCEDURE — D9220A PRA ANESTHESIA: Mod: CRNA,,, | Performed by: NURSE ANESTHETIST, CERTIFIED REGISTERED

## 2020-08-12 PROCEDURE — 22515 PERQ VERTEBRAL AUGMENTATION: CPT | Mod: ,,, | Performed by: ANESTHESIOLOGY

## 2020-08-12 PROCEDURE — 25000003 PHARM REV CODE 250: Mod: PO | Performed by: ANESTHESIOLOGY

## 2020-08-12 PROCEDURE — 25500020 PHARM REV CODE 255: Mod: PO | Performed by: ANESTHESIOLOGY

## 2020-08-12 PROCEDURE — 63600175 PHARM REV CODE 636 W HCPCS: Mod: PO | Performed by: ANESTHESIOLOGY

## 2020-08-12 PROCEDURE — 36000706: Mod: PO | Performed by: ANESTHESIOLOGY

## 2020-08-12 PROCEDURE — 22515 PR PERQ VERTEBRAL AUGMENTATION UNI/BILAT EA ADDL THORACIC/LUMBAR: ICD-10-PCS | Mod: ,,, | Performed by: ANESTHESIOLOGY

## 2020-08-12 PROCEDURE — 71000015 HC POSTOP RECOV 1ST HR: Mod: PO | Performed by: ANESTHESIOLOGY

## 2020-08-12 PROCEDURE — 36000707: Mod: PO | Performed by: ANESTHESIOLOGY

## 2020-08-12 PROCEDURE — 27800903 OPTIME MED/SURG SUP & DEVICES OTHER IMPLANTS: Mod: PO | Performed by: ANESTHESIOLOGY

## 2020-08-12 PROCEDURE — 22513 PERQ VERTEBRAL AUGMENTATION: CPT | Mod: ,,, | Performed by: ANESTHESIOLOGY

## 2020-08-12 PROCEDURE — U0002 COVID-19 LAB TEST NON-CDC: HCPCS | Mod: PO

## 2020-08-12 PROCEDURE — 22513 PR PERQ VERTEBRAL AUGMENTATION UNI/BILAT THORACIC: ICD-10-PCS | Mod: ,,, | Performed by: ANESTHESIOLOGY

## 2020-08-12 PROCEDURE — 76000 FLUOROSCOPY <1 HR PHYS/QHP: CPT | Mod: TC,PO

## 2020-08-12 PROCEDURE — C1726 CATH, BAL DIL, NON-VASCULAR: HCPCS | Mod: PO | Performed by: ANESTHESIOLOGY

## 2020-08-12 PROCEDURE — 63600175 PHARM REV CODE 636 W HCPCS: Mod: PO | Performed by: NURSE ANESTHETIST, CERTIFIED REGISTERED

## 2020-08-12 PROCEDURE — 37000008 HC ANESTHESIA 1ST 15 MINUTES: Mod: PO | Performed by: ANESTHESIOLOGY

## 2020-08-12 PROCEDURE — 37000009 HC ANESTHESIA EA ADD 15 MINS: Mod: PO | Performed by: ANESTHESIOLOGY

## 2020-08-12 PROCEDURE — 25000003 PHARM REV CODE 250: Mod: PO | Performed by: NURSE ANESTHETIST, CERTIFIED REGISTERED

## 2020-08-12 PROCEDURE — D9220A PRA ANESTHESIA: Mod: ANES,,, | Performed by: ANESTHESIOLOGY

## 2020-08-12 PROCEDURE — D9220A PRA ANESTHESIA: ICD-10-PCS | Mod: CRNA,,, | Performed by: NURSE ANESTHETIST, CERTIFIED REGISTERED

## 2020-08-12 DEVICE — IMPLANTABLE DEVICE: Type: IMPLANTABLE DEVICE | Site: BACK | Status: FUNCTIONAL

## 2020-08-12 RX ORDER — SODIUM CHLORIDE, SODIUM LACTATE, POTASSIUM CHLORIDE, CALCIUM CHLORIDE 600; 310; 30; 20 MG/100ML; MG/100ML; MG/100ML; MG/100ML
INJECTION, SOLUTION INTRAVENOUS CONTINUOUS
Status: DISCONTINUED | OUTPATIENT
Start: 2020-08-12 | End: 2020-08-12 | Stop reason: HOSPADM

## 2020-08-12 RX ORDER — ONDANSETRON 2 MG/ML
INJECTION INTRAMUSCULAR; INTRAVENOUS
Status: DISCONTINUED | OUTPATIENT
Start: 2020-08-12 | End: 2020-08-12

## 2020-08-12 RX ORDER — PROPOFOL 10 MG/ML
VIAL (ML) INTRAVENOUS CONTINUOUS PRN
Status: DISCONTINUED | OUTPATIENT
Start: 2020-08-12 | End: 2020-08-12

## 2020-08-12 RX ORDER — MIDAZOLAM HYDROCHLORIDE 1 MG/ML
INJECTION, SOLUTION INTRAMUSCULAR; INTRAVENOUS
Status: DISCONTINUED | OUTPATIENT
Start: 2020-08-12 | End: 2020-08-12

## 2020-08-12 RX ORDER — PROPOFOL 10 MG/ML
VIAL (ML) INTRAVENOUS
Status: DISCONTINUED | OUTPATIENT
Start: 2020-08-12 | End: 2020-08-12

## 2020-08-12 RX ORDER — LIDOCAINE HCL/PF 100 MG/5ML
SYRINGE (ML) INTRAVENOUS
Status: DISCONTINUED | OUTPATIENT
Start: 2020-08-12 | End: 2020-08-12

## 2020-08-12 RX ORDER — LIDOCAINE HYDROCHLORIDE 10 MG/ML
INJECTION, SOLUTION EPIDURAL; INFILTRATION; INTRACAUDAL; PERINEURAL
Status: DISCONTINUED | OUTPATIENT
Start: 2020-08-12 | End: 2020-08-12 | Stop reason: HOSPADM

## 2020-08-12 RX ORDER — FENTANYL CITRATE 50 UG/ML
INJECTION, SOLUTION INTRAMUSCULAR; INTRAVENOUS
Status: DISCONTINUED | OUTPATIENT
Start: 2020-08-12 | End: 2020-08-12

## 2020-08-12 RX ORDER — CEFAZOLIN SODIUM 2 G/50ML
2 SOLUTION INTRAVENOUS
Status: COMPLETED | OUTPATIENT
Start: 2020-08-12 | End: 2020-08-12

## 2020-08-12 RX ADMIN — FENTANYL CITRATE 25 MCG: 50 INJECTION, SOLUTION INTRAMUSCULAR; INTRAVENOUS at 01:08

## 2020-08-12 RX ADMIN — MIDAZOLAM 0.5 MG: 1 INJECTION INTRAMUSCULAR; INTRAVENOUS at 01:08

## 2020-08-12 RX ADMIN — ONDANSETRON 4 MG: 2 INJECTION, SOLUTION INTRAMUSCULAR; INTRAVENOUS at 01:08

## 2020-08-12 RX ADMIN — PROPOFOL 50 MCG/KG/MIN: 10 INJECTION, EMULSION INTRAVENOUS at 01:08

## 2020-08-12 RX ADMIN — SODIUM CHLORIDE, SODIUM LACTATE, POTASSIUM CHLORIDE, AND CALCIUM CHLORIDE: .6; .31; .03; .02 INJECTION, SOLUTION INTRAVENOUS at 12:08

## 2020-08-12 RX ADMIN — PROPOFOL 30 MG: 10 INJECTION, EMULSION INTRAVENOUS at 01:08

## 2020-08-12 RX ADMIN — LIDOCAINE HYDROCHLORIDE 50 MG: 20 INJECTION PARENTERAL at 01:08

## 2020-08-12 RX ADMIN — CEFAZOLIN SODIUM 2 G: 2 SOLUTION INTRAVENOUS at 01:08

## 2020-08-12 RX ADMIN — FENTANYL CITRATE 50 MCG: 50 INJECTION, SOLUTION INTRAMUSCULAR; INTRAVENOUS at 01:08

## 2020-08-12 NOTE — ANESTHESIA PREPROCEDURE EVALUATION
08/12/2020  Bib De Oliveira Sr. is a 80 y.o., male.    Pre-op Assessment    I have reviewed the Patient Summary Reports.     I have reviewed the Nursing Notes. I have reviewed the NPO Status.   I have reviewed the Medications.     Review of Systems  Hematology/Oncology:         -- Anemia: Hematology Comments: Hgb 10   Cardiovascular:   Hypertension CAD   ECG has been reviewed. CORONARY ANATOMY:     The right coronary artery is a large size vessel with mild luminal irregularities with a 30% stenotic lesion in the distal RCA, otherwise no significant disease.  Distally, it gives an RPDA that is relatively small with no acute disease and RPL system that is large multi-branching with no significant disease.     Left main is very short, but no significant disease.       LAD is a large size vessel with mild luminal irregularities, but no significant disease.  Diagonal 1 is small with no disease. Diagonal 2 is a medium size vessel with no significant disease.     Left circumflex artery is a large size vessel with an ectatic segment in the mid segment, otherwise no significant disease.  It terminates on a large obtuse marginal 2 that has two branches.  OM2 is ectatic proximally, otherwise no significant disease.     Ejection fraction 50%.     IMPRESSION:  1.  False/positive stress test.  2.  Normal ejection fraction.  3.  No significant coronary artery disease.   Pulmonary:  Pulmonary Normal    Renal/:   Chronic Renal Disease, CRI    Musculoskeletal:   Compression fxrs Spine Disorders: lumbar    Neurological:  Neurology Normal    Endocrine:   Diabetes        Physical Exam  General:  Cachexia    Airway/Jaw/Neck:  Airway Findings: Mouth Opening: Normal Tongue: Normal  General Airway Assessment: Adult  Mallampati: III  Improves to II with phonation.      Dental:  Dental Findings: In tact   Chest/Lungs:  Chest/Lungs  Findings: Clear to auscultation, Normal Respiratory Rate         Mental Status:  Mental Status Findings:  Cooperative, Alert and Oriented  In pain       Anesthesia Plan  Type of Anesthesia, risks & benefits discussed:  Anesthesia Type:  general  Patient's Preference:   Intra-op Monitoring Plan: standard ASA monitors  Intra-op Monitoring Plan Comments:   Post Op Pain Control Plan:   Post Op Pain Control Plan Comments:   Induction:   IV  Beta Blocker:  Patient is not currently on a Beta-Blocker (No further documentation required).       Informed Consent: Patient understands risks and agrees with Anesthesia plan.  Questions answered. Anesthesia consent signed with patient.  ASA Score: 3     Day of Surgery Review of History & Physical: I have interviewed and examined the patient. I have reviewed the patient's H&P dated:            Ready For Surgery From Anesthesia Perspective.

## 2020-08-12 NOTE — TRANSFER OF CARE
"Anesthesia Transfer of Care Note    Patient: Bib BULL Alvino Becker    Procedure(s) Performed: Procedure(s) (LRB):  Kyphoplasty T12 and L1 (N/A)    Patient location: PACU    Anesthesia Type: MAC    Transport from OR: Transported from OR on room air with adequate spontaneous ventilation    Post pain: adequate analgesia    Post assessment: no apparent anesthetic complications    Post vital signs: stable    Level of consciousness: awake    Nausea/Vomiting: no nausea/vomiting    Complications: none    Transfer of care protocol was followed      Last vitals:   Visit Vitals  BP (!) 162/83 (BP Location: Right arm, Patient Position: Lying)   Pulse 67   Temp 36.9 °C (98.4 °F)   Resp 17   Ht 5' 10" (1.778 m)   Wt 59 kg (130 lb)   SpO2 95%   BMI 18.65 kg/m²     "

## 2020-08-12 NOTE — DISCHARGE INSTRUCTIONS
Discharge Instructions for Kyphoplasty  Fractures in the bones of the spine (vertebrae) can cause severe back pain and loss of movement. You had a procedure called kyphoplasty to cement the fractures in your spine, restore the height of the vertebrae, and help relieve pain. Using image-guided X-rays, your doctor made 1 or 2 small cuts (incisions) in your back for each vertebra treated. The doctor put a balloon on each side of the broken vertebra and inflated the balloons until they expanded the right amount. Then the balloons were removed. The spaces created by the balloons were filled with orthopedic cement. This gave strength and stability to your vertebra. The following are instructions to help you care for your back when you are at home.  Home care  · Take your medicine exactly as directed.  · Remove the small bandages on your incision 24 to 48 hours after the surgery.  · Dont shower or soak in a bathtub for 1 to 2 days after the surgery.  · Use an ice pack or bag of frozen peas--or something similar--wrapped in a thin towel to reduce the swelling and pain around incision sites. Put the ice pack on the area for 20 minutes, then remove it for 20 minutes. Repeat as needed.  · Wear your brace, if you were told to do so by your doctor. And to help stay flexible, bend as much as the brace allows you to.  · For the first 1 to 2 days after the surgery, keep your head raised up when you are lying down.  · Take short walks. Start by walking for 5 minutes at a time. Then gradually build up your time and distance.  · Dont drive for 2 days after surgery. And never drive while taking opioid pain medicine.  · Ask your healthcare provider when you can begin lifting objects again. Ask him or her about any weight limits for lifting.  Follow-up  Make a follow-up appointment as directed by your doctor.     When to seek medical attention  Call 911 right away if you have any of the following:  · Chest pain  · Shortness of  breath  Otherwise, call your doctor right away if you have any of the following:  · Increased redness, swelling, drainage, or warmth around the incision sites  · Severe pain at the incision site  · Weakness, numbness, or tingling in your legs  · Fever above 100.4°F  (38.0°C) or shaking chills   Date Last Reviewed: 11/16/2015  © 3965-2039 [employer]. [address] [place] [address], [place] [address]. All rights reserved. This information is not intended as a substitute for professional medical care. Always follow your healthcare professional's instructions.

## 2020-08-12 NOTE — OP NOTE
PROCEDURE PERFORMED: Transpedicular Kyphoplasty at the T12, L1 level(s), from the right                                                                               PREPROCEDURE DIAGNOSIS:                                                      1.  Compression fracture of the lumbar vertebra at the T12, [id number](s).         2.  Pain at these levels of compression fracture.                            3.  No myelopathy or retropulsed fragments.                                                                                                             POSTPROCEDURE DIAGNOSIS:   Same    SURGEON:  Galindo Kimbrough MD     Assistant:  None                                                                                                                                                                               Anesthesia: MAC per anesthesia                                                                                                              LOCAL ANESTHETIC USED:  Lidocaine 1% at each side of vertebra, 3 ml at each level                                                                               ESTIMATED BLOOD LOSS: <10ml                                                                                                                        COMPLICATIONS: none                                                                                                                                 BONE MARROW BIOPSY:  none                                                                               TECHNIQUE: A time out was taken to identify patient, procedure and side, and allergies were reviewed. With the patient lying in the prone position and after receiving the intravenous sedation, the area was prepped and draped using the usual sterile fashion, using ChloraPrep and a body fenestrated drape. The area of the compression fracture(s) was determined under fluoroscopic guidance using the AP and lateral views.  Local anesthetic  was given with a 25-gauge 1.5 inch needle.  That was followed with completing the local anesthetic injection with a 3.5inch 22-gauge spinal needle going down all the way to the periosteum of the desired pedicle. A 2-mm longitudinal incision was made around the 22-gauge spinal needle to allow introduction of the each trocar and cannula to the vertebral body.  Through an extrapedicular approach, the trocar and cannula were introduced and advanced slowly.  Once in the posterior one-third of the vertebral body, curved needle was placed and advanced to the anterior one-third of the vertebral body. Care fusion balloon then placed in trochar and inflated under live fluoroscopy until noted to be filling the vertebral body, mild superior endplate height increased. The methylmethacrylate resin was mixed and approximately 5ml was injected using each trocars. No escape was observed while introducing the cement and this was done under live fluoroscopy.  The cannula was applied to each trocar under live fluoroscopy as well.  Each trocar and cannula was removed under live fluoroscopy in a very slow fashion to observe the contrast-impregnated cement. After the trocars were removed, the sites were cleaned and dried. Dermabond was then used to close the incisions.                                                                                The patient layed supine for 60min after the procedure. The patient was monitored after the procedure with no change in neuromuscular status. The patient was given post-procedure and discharge instructions at home.  Instruction regarding bandage were given.  The patient was advised to call if developing any severe pain neurologic changes. Otherwise the patient will follow up with us in 1 week at our clinic.

## 2020-08-12 NOTE — DISCHARGE SUMMARY
[facility]  Discharge Note  Short Stay    Admit Date: 8/12/2020    Discharge Date: 8/12/2020    Attending Physician: Galindo Kimbrough MD     Discharge Provider: Galindo Kimbrough    Diagnoses:  Active Hospital Problems    Diagnosis  POA    *Compression fracture of lumbar vertebra, non-traumatic, initial encounter [M48.56XA]  Yes      Resolved Hospital Problems   No resolved problems to display.       Discharged Condition: good    Final Diagnoses: Compression fracture of T12 vertebra, initial encounter [S22.080A]    Disposition: Home or Self Care    Hospital Course: no complications, uneventful    Outcome of Hospitalization, Treatment, Procedure, or Surgery:  Patient was admitted for outpatient procedure. The patient underwent procedure without complications and are discharged home    Follow up/Patient Instructions:  Follow up as scheduled in Pain Management clinic in 3-4 weeks/Patient has received instructions and follow up date and time    Medications:  Continue previous medications    Discharge Procedure Orders   Call MD for:  temperature >100.4     Call MD for:  severe uncontrolled pain     Call MD for:  redness, tenderness, or signs of infection (pain, swelling, redness, odor or green/yellow discharge around incision site)     Call MD for:  severe persistent headache     No dressing needed     Call MD for:  temperature >100.4     Call MD for:  severe uncontrolled pain     Call MD for:  redness, tenderness, or signs of infection (pain, swelling, redness, odor or green/yellow discharge around incision site)     Call MD for:  severe persistent headache     No dressing needed         Discharge Procedure Orders (must include Diet, Follow-up, Activity):   Discharge Procedure Orders (must include Diet, Follow-up, Activity)   Call MD for:  temperature >100.4     Call MD for:  severe uncontrolled pain     Call MD for:  redness, tenderness, or signs of infection (pain, swelling, redness, odor or  green/yellow discharge around incision site)     Call MD for:  severe persistent headache     No dressing needed     Call MD for:  temperature >100.4     Call MD for:  severe uncontrolled pain     Call MD for:  redness, tenderness, or signs of infection (pain, swelling, redness, odor or green/yellow discharge around incision site)     Call MD for:  severe persistent headache     No dressing needed

## 2020-08-12 NOTE — INTERVAL H&P NOTE
The patient has been examined and the H&P has been reviewed:    I concur with the findings and no changes have occurred since H&P was written.    Surgery risks, benefits and alternative options discussed and understood by patient/family.          Active Hospital Problems    Diagnosis  POA    Compression fracture of lumbar vertebra, non-traumatic, initial encounter [M48.56XA]  Yes      Resolved Hospital Problems   No resolved problems to display.

## 2020-08-13 VITALS
OXYGEN SATURATION: 96 % | HEART RATE: 75 BPM | DIASTOLIC BLOOD PRESSURE: 75 MMHG | TEMPERATURE: 98 F | BODY MASS INDEX: 18.61 KG/M2 | WEIGHT: 130 LBS | SYSTOLIC BLOOD PRESSURE: 140 MMHG | HEIGHT: 70 IN | RESPIRATION RATE: 18 BRPM

## 2020-08-13 NOTE — ANESTHESIA POSTPROCEDURE EVALUATION
Anesthesia Post Evaluation    Patient: Bib De Oliveira Sr.    Procedure(s) Performed: Procedure(s) (LRB):  Kyphoplasty T12 and L1 (N/A)    Final Anesthesia Type: MAC    Patient location during evaluation: PACU  Patient participation: Yes- Able to Participate  Level of consciousness: awake and alert, oriented and awake  Post-procedure vital signs: reviewed and stable  Pain management: adequate  Airway patency: patent    PONV status at discharge: No PONV  Anesthetic complications: no      Cardiovascular status: blood pressure returned to baseline and hemodynamically stable  Respiratory status: unassisted, spontaneous ventilation and room air  Hydration status: euvolemic  Follow-up not needed.          Vitals Value Taken Time   /75 08/12/20 1453   Temp 36.6 °C (97.8 °F) 08/12/20 1438   Pulse 75 08/12/20 1453   Resp 18 08/12/20 1453   SpO2 96 % 08/12/20 1453         No case tracking events are documented in the log.      Pain/Loki Score: Loki Score: 10 (8/12/2020  3:00 PM)

## 2020-08-19 ENCOUNTER — OFFICE VISIT (OUTPATIENT)
Dept: PAIN MEDICINE | Facility: CLINIC | Age: 80
End: 2020-08-19
Payer: MEDICARE

## 2020-08-19 ENCOUNTER — HOSPITAL ENCOUNTER (OUTPATIENT)
Dept: RADIOLOGY | Facility: HOSPITAL | Age: 80
Discharge: HOME | End: 2020-08-19
Attending: PHYSICIAN ASSISTANT
Payer: MEDICARE

## 2020-08-19 VITALS — SYSTOLIC BLOOD PRESSURE: 174 MMHG | DIASTOLIC BLOOD PRESSURE: 88 MMHG | HEART RATE: 87 BPM | RESPIRATION RATE: 18 BRPM

## 2020-08-19 DIAGNOSIS — M54.16 LUMBAR RADICULOPATHY: Primary | ICD-10-CM

## 2020-08-19 DIAGNOSIS — Z11.9 SCREENING EXAMINATION FOR INFECTIOUS DISEASE: ICD-10-CM

## 2020-08-19 DIAGNOSIS — M51.36 DDD (DEGENERATIVE DISC DISEASE), LUMBAR: ICD-10-CM

## 2020-08-19 DIAGNOSIS — S22.080D COMPRESSION FRACTURE OF T12 VERTEBRA WITH ROUTINE HEALING, SUBSEQUENT ENCOUNTER: ICD-10-CM

## 2020-08-19 PROCEDURE — 99214 PR OFFICE/OUTPT VISIT, EST, LEVL IV, 30-39 MIN: ICD-10-PCS | Mod: 24,S$PBB,, | Performed by: PHYSICIAN ASSISTANT

## 2020-08-19 PROCEDURE — 99213 OFFICE O/P EST LOW 20 MIN: CPT | Mod: PBBFAC,25,PN | Performed by: PHYSICIAN ASSISTANT

## 2020-08-19 PROCEDURE — 99214 OFFICE O/P EST MOD 30 MIN: CPT | Mod: 24,S$PBB,, | Performed by: PHYSICIAN ASSISTANT

## 2020-08-19 PROCEDURE — 99999 PR PBB SHADOW E&M-EST. PATIENT-LVL III: CPT | Mod: PBBFAC,,, | Performed by: PHYSICIAN ASSISTANT

## 2020-08-19 PROCEDURE — 72080 X-RAY EXAM THORACOLMB 2/> VW: CPT | Mod: 26,,, | Performed by: RADIOLOGY

## 2020-08-19 PROCEDURE — 72080 XR THORACOLUMBAR SPINE AP LATERAL: ICD-10-PCS | Mod: 26,,, | Performed by: RADIOLOGY

## 2020-08-19 PROCEDURE — 72080 X-RAY EXAM THORACOLMB 2/> VW: CPT | Mod: TC,PO

## 2020-08-19 PROCEDURE — 99999 PR PBB SHADOW E&M-EST. PATIENT-LVL III: ICD-10-PCS | Mod: PBBFAC,,, | Performed by: PHYSICIAN ASSISTANT

## 2020-08-19 RX ORDER — HYDROMORPHONE HYDROCHLORIDE 2 MG/1
2 TABLET ORAL EVERY 6 HOURS PRN
Qty: 20 TABLET | Refills: 0 | Status: SHIPPED | OUTPATIENT
Start: 2020-08-19 | End: 2020-09-01 | Stop reason: SDUPTHER

## 2020-08-19 RX ORDER — ALPRAZOLAM 0.5 MG/1
1 TABLET, ORALLY DISINTEGRATING ORAL ONCE AS NEEDED
Status: CANCELLED | OUTPATIENT
Start: 2020-08-25 | End: 2032-01-21

## 2020-08-19 NOTE — TELEPHONE ENCOUNTER
Please let the patient know I reviewed his case with Dr. Kimbrough and schedule him for an L5/S1 interlaminar epidural steroid injection.  Dr. Kimbrough will send a prescription for dilaudid to his pharmacy.

## 2020-08-20 ENCOUNTER — TELEPHONE (OUTPATIENT)
Dept: PAIN MEDICINE | Facility: CLINIC | Age: 80
End: 2020-08-20

## 2020-08-20 NOTE — TELEPHONE ENCOUNTER
Spoke with patient's wife regarding his new compression fracture at T11.  He has minimal pain in this area at this time.  He is scheduled for a lumbar NEHEMIAH next week for his low back pain and is requesting to have a rapid same day COVID test since he lives over 1 hour away and the long ride is excruciating for him.

## 2020-08-22 ENCOUNTER — LAB VISIT (OUTPATIENT)
Dept: FAMILY MEDICINE | Facility: CLINIC | Age: 80
End: 2020-08-22
Payer: MEDICARE

## 2020-08-22 DIAGNOSIS — Z11.9 SCREENING EXAMINATION FOR INFECTIOUS DISEASE: ICD-10-CM

## 2020-08-22 PROCEDURE — U0003 INFECTIOUS AGENT DETECTION BY NUCLEIC ACID (DNA OR RNA); SEVERE ACUTE RESPIRATORY SYNDROME CORONAVIRUS 2 (SARS-COV-2) (CORONAVIRUS DISEASE [COVID-19]), AMPLIFIED PROBE TECHNIQUE, MAKING USE OF HIGH THROUGHPUT TECHNOLOGIES AS DESCRIBED BY CMS-2020-01-R: HCPCS

## 2020-08-23 LAB — SARS-COV-2 RNA RESP QL NAA+PROBE: NOT DETECTED

## 2020-08-25 ENCOUNTER — HOSPITAL ENCOUNTER (OUTPATIENT)
Dept: RADIOLOGY | Facility: HOSPITAL | Age: 80
Discharge: HOME | End: 2020-08-25
Attending: ANESTHESIOLOGY
Payer: MEDICARE

## 2020-08-25 ENCOUNTER — HOSPITAL ENCOUNTER (OUTPATIENT)
Facility: HOSPITAL | Age: 80
Discharge: HOME | End: 2020-08-25
Attending: ANESTHESIOLOGY | Admitting: ANESTHESIOLOGY
Payer: MEDICARE

## 2020-08-25 ENCOUNTER — DOCUMENT SCAN (OUTPATIENT)
Dept: HOME HEALTH SERVICES | Facility: HOSPITAL | Age: 80
End: 2020-08-25
Payer: MEDICARE

## 2020-08-25 VITALS
WEIGHT: 117 LBS | HEIGHT: 71 IN | RESPIRATION RATE: 15 BRPM | SYSTOLIC BLOOD PRESSURE: 125 MMHG | DIASTOLIC BLOOD PRESSURE: 80 MMHG | BODY MASS INDEX: 16.38 KG/M2 | TEMPERATURE: 98 F | OXYGEN SATURATION: 95 % | HEART RATE: 93 BPM

## 2020-08-25 DIAGNOSIS — S32.000A COMPRESSION FRACTURE OF LUMBAR VERTEBRA, INITIAL ENCOUNTER, UNSPECIFIED LUMBAR VERTEBRAL LEVEL: ICD-10-CM

## 2020-08-25 DIAGNOSIS — M54.16 LUMBAR RADICULOPATHY: Primary | ICD-10-CM

## 2020-08-25 PROCEDURE — 62323 NJX INTERLAMINAR LMBR/SAC: CPT | Mod: ,,, | Performed by: ANESTHESIOLOGY

## 2020-08-25 PROCEDURE — 62323 NJX INTERLAMINAR LMBR/SAC: CPT | Mod: PO | Performed by: ANESTHESIOLOGY

## 2020-08-25 PROCEDURE — 62323 PR INJ LUMBAR/SACRAL, W/IMAGING GUIDANCE: ICD-10-PCS | Mod: ,,, | Performed by: ANESTHESIOLOGY

## 2020-08-25 PROCEDURE — 76000 FLUOROSCOPY <1 HR PHYS/QHP: CPT | Mod: TC,PO

## 2020-08-25 PROCEDURE — 63600175 PHARM REV CODE 636 W HCPCS: Mod: PO | Performed by: ANESTHESIOLOGY

## 2020-08-25 PROCEDURE — 25500020 PHARM REV CODE 255: Mod: PO | Performed by: ANESTHESIOLOGY

## 2020-08-25 PROCEDURE — 25000003 PHARM REV CODE 250: Mod: PO | Performed by: ANESTHESIOLOGY

## 2020-08-25 RX ORDER — ALPRAZOLAM 0.5 MG/1
1 TABLET, ORALLY DISINTEGRATING ORAL ONCE AS NEEDED
Status: COMPLETED | OUTPATIENT
Start: 2020-08-25 | End: 2020-08-25

## 2020-08-25 RX ORDER — METHYLPREDNISOLONE ACETATE 80 MG/ML
INJECTION, SUSPENSION INTRA-ARTICULAR; INTRALESIONAL; INTRAMUSCULAR; SOFT TISSUE
Status: DISCONTINUED | OUTPATIENT
Start: 2020-08-25 | End: 2020-08-25 | Stop reason: HOSPADM

## 2020-08-25 RX ORDER — HYDROMORPHONE HYDROCHLORIDE 2 MG/1
2 TABLET ORAL EVERY 6 HOURS PRN
Qty: 24 TABLET | Refills: 0 | Status: SHIPPED | OUTPATIENT
Start: 2020-08-25 | End: 2020-08-26

## 2020-08-25 RX ORDER — LIDOCAINE HYDROCHLORIDE 10 MG/ML
INJECTION, SOLUTION EPIDURAL; INFILTRATION; INTRACAUDAL; PERINEURAL
Status: DISCONTINUED | OUTPATIENT
Start: 2020-08-25 | End: 2020-08-25 | Stop reason: HOSPADM

## 2020-08-25 RX ADMIN — ALPRAZOLAM 1 MG: 0.5 TABLET, ORALLY DISINTEGRATING ORAL at 12:08

## 2020-08-25 NOTE — DISCHARGE SUMMARY
[facility]  Discharge Note  Short Stay    Admit Date: 8/25/2020    Discharge Date: 8/25/2020    Attending Physician: Galindo Kimbrough MD     Discharge Provider: Galindo Kimbrough    Diagnoses:  Active Hospital Problems    Diagnosis  POA    *Lumbar radiculopathy [M54.16]  Yes      Resolved Hospital Problems   No resolved problems to display.       Discharged Condition: good    Final Diagnoses: Lumbar radiculopathy [M54.16]    Disposition: Home or Self Care    Hospital Course: no complications, uneventful    Outcome of Hospitalization, Treatment, Procedure, or Surgery:  Patient was admitted for outpatient procedure. The patient underwent procedure without complications and are discharged home    Follow up/Patient Instructions:  Follow up as scheduled in Pain Management clinic in 3-4 weeks/Patient has received instructions and follow up date and time    Medications:  Continue previous medications    Discharge Procedure Orders   Call MD for:  temperature >100.4     Call MD for:  severe uncontrolled pain     Call MD for:  redness, tenderness, or signs of infection (pain, swelling, redness, odor or green/yellow discharge around incision site)     Call MD for:  severe persistent headache     No dressing needed         Discharge Procedure Orders (must include Diet, Follow-up, Activity):   Discharge Procedure Orders (must include Diet, Follow-up, Activity)   Call MD for:  temperature >100.4     Call MD for:  severe uncontrolled pain     Call MD for:  redness, tenderness, or signs of infection (pain, swelling, redness, odor or green/yellow discharge around incision site)     Call MD for:  severe persistent headache     No dressing needed

## 2020-08-25 NOTE — H&P (VIEW-ONLY)
This note was completed with dictation software and grammatical errors may exist.    CC:  Back pain    HPI:  The patient is an 80-year-old man with a history of pancreatic cancer, tobacco use who presents in referral from Dr. Rajeev Oliver for back pain.  He is status post T12 and L1 kyphoplasty on 8/12/20 with no relief.  He is new to me.  He reports worsening low back pain but points to the lower lumbar region.  This is severe unless he is laying flat on his back. He reports minimal relief with hydromorphone.  He complains of left leg weakness but no radicular pain.  He describes spasms in his low back. He denies numbness or incontinence.     Previous history:  The patient reports that 2 months ago he was lifting a trailer and felt a sudden back pain.  Since then he has had continued severe back pain.  Is located in the low back and into the sacrum.  He states that it radiates out to the side slightly but denies any radiation into the legs.  He describes it as burning, throbbing, grabbing, tight, tingling, numb, sharp, electric shooting and hot and cold.  It is worse with prolonged sitting or standing, doing any walking, bending, flexing, lifting and with getting out of a bed or a chair, causes difficulty with trying to stand up, he feels weak in the legs.  He has been mostly sitting in a wheelchair, not walking much on his own, having difficulty timing issues and getting dressed.  He gets some relief with lying down and with medications.  When he 1st was injured, this was diagnosis of possible muscle strain, he continued to have severe pain however.  He ended up developing what sounds like a urinary tract infection and had been admitted to a hospital where they found renal calculi but on a CT scan they also noted an L3 compression fracture of unclear age but he did not have this several months prior according to other imaging of that area.    Pain intervention history: He is status post L2 and L3 kyphoplasty  on 08/03/2020.  He is status post T12 and L1 kyphoplasty on 8/12/20 with no relief.     Antineuropathics:  NSAIDs:  Physical therapy:  Antidepressants:  Muscle relaxers:  Opioids: He has been taking hydrocodone 7.5/325 every 6 hours and states that it does not help much.  He was actually started on tramadol, went to 5 milligram hydrocodone and eventually on 7.5 and he states that it does nothing.  Currently taking hydromorphone 2mg  Antiplatelets/Anticoagulants:    ROS:  He reports weight loss, vision change, eye pain, cough, appetite change, diarrhea, easy bruising, urinary urgency, renal calculi, loss of balance.  Balance of review systems is negative.    Lab Results   Component Value Date    LABA1C 7.1 01/06/2020    HGBA1C 7.5 (H) 04/20/2020       Lab Results   Component Value Date    WBC 6.74 07/17/2020    HGB 10.7 (L) 07/17/2020    HCT 32.2 (L) 07/17/2020    MCV 89 07/17/2020     07/17/2020         Past Medical History:   Diagnosis Date    Abnormal PSA     Basal cell carcinoma of left ear 12/2/2015    Closed fracture of left tibial plateau with routine healing s/p ORIF on 7/15/2016 7/5/2016    Coronary artery disease (CAD) excluded 10/22/2018    DVT (deep venous thrombosis)     Essential hypertension, benign 12/2/2015    Hyperlipidemia     Pancreatic cancer 10/2018    Thyroid disease     Type 2 diabetes mellitus with complication 12/2/2015       Past Surgical History:   Procedure Laterality Date    BASAL CELL CARCINOMA EXCISION  03/17/2017    BLADDER SURGERY      COLONOSCOPY N/A 9/7/2018    Procedure: COLONOSCOPY;  Surgeon: Olu Ahn MD;  Location: [facility] ENDO;  Service: Endoscopy;  Laterality: N/A;    CORONARY ANGIOGRAPHY N/A 10/22/2018    Procedure: ANGIOGRAM, CORONARY ARTERY;  Surgeon: Kevin Huizar MD;  Location: [facility] CATH;  Service: Cardiovascular;  Laterality: N/A;    ENDOSCOPIC ULTRASOUND OF UPPER GASTROINTESTINAL TRACT Left 9/11/2018    Procedure: ULTRASOUND, ENDOSCOPIC,  UPPER GI TRACT;  Surgeon: Joss May MD;  Location: [facility] ENDO;  Service: Endoscopy;  Laterality: Left;  Linear scope    ESOPHAGOGASTRODUODENOSCOPY N/A 9/11/2018    Procedure: EGD (ESOPHAGOGASTRODUODENOSCOPY);  Surgeon: Joss May MD;  Location: [facility] ENDO;  Service: Endoscopy;  Laterality: N/A;    EXTERNAL FIXATION TIBIAL FRACTURE  07/05/2016    FIXATION KYPHOPLASTY N/A 8/3/2020    Procedure: Kyphoplasty L2 and L3;  Surgeon: Galindo Kimbrough MD;  Location: [facility] OR;  Service: Pain Management;  Laterality: N/A;    FIXATION KYPHOPLASTY N/A 8/12/2020    Procedure: Kyphoplasty T12 and L1;  Surgeon: Galindo Kimbrough MD;  Location: [facility] OR;  Service: Pain Management;  Laterality: N/A;    HERNIA REPAIR      LEFT HEART CATHETERIZATION Left 10/22/2018    Procedure: Left heart cath;  Surgeon: Kevin Huizar MD;  Location: [facility] CATH;  Service: Cardiovascular;  Laterality: Left;    ORIF TIBIA FRACTURE  07/15/2016    PANCREAS SURGERY  10/31/2018    PARTIAL HIP ARTHROPLASTY      PROSTATE BIOPSY         Social History     Socioeconomic History    Marital status:      Spouse name: Not on file    Number of children: Not on file    Years of education: Not on file    Highest education level: Not on file   Occupational History    Not on file   Social Needs    Financial resource strain: Not on file    Food insecurity     Worry: Not on file     Inability: Not on file    Transportation needs     Medical: Not on file     Non-medical: Not on file   Tobacco Use    Smoking status: Current Every Day Smoker     Packs/day: 2.00     Years: 60.00     Pack years: 120.00    Smokeless tobacco: Never Used   Substance and Sexual Activity    Alcohol use: No     Alcohol/week: 0.0 standard drinks    Drug use: No    Sexual activity: Not on file   Lifestyle    Physical activity     Days per week: Not on file     Minutes per session: Not on file    Stress: Not on file   Relationships    Social  connections     Talks on phone: Not on file     Gets together: Not on file     Attends [employer] service: Not on file     Active member of club or organization: Not on file     Attends meetings of clubs or organizations: Not on file     Relationship status: Not on file   Other Topics Concern    Not on file   Social History Narrative    Not on file         Medications/Allergies: See med card    Vitals:    20 1501   BP: (!) 174/88   Pulse: 87   Resp: 18   PainSc: 10-Worst pain ever   PainLoc: Back         Physical exam:  Gen: A and O x3, pleasant, well-groomed  Skin: No rashes or obvious lesions  HEENT: PERRLA, no obvious deformities on ears or in canals. Trachea midline.  CVS: Regular rate and rhythm, normal palpable pulses.  Resp:No increased work of breathing, symmetrical chest rise.  Abdomen: Soft, NT/ND.  Musculoskeletal:  Presents in a wheelchair, able to stand but difficulty with balance.    Neuro:  Lower extremities: 5/5 strength bilaterally, except for hip flexion 4/5 bilaterally  Reflexes: Patellar 1+, Achilles 1+ bilaterally.  Sensory:  Intact and symmetrical to light touch and pinprick in L2-S1 dermatomes bilaterally.    Lumbar spine:  Lumbar spine:  Range of motion severely reduced with both flexion and extension with increased pain especially on extension.  Jamir's test deferred  Supine straight leg raise is negative bilaterally.    Internal and external rotation of the hip causes no increased pain on either side.  Myofascial exam:  Moderate tenderness to palpation to the lumbar paraspinous muscles.  No tenderness to palpation or percussion to the lumbar spinous processes.  Mild tenderness to palpation and percussion to the T10, T11 spinous processes.    Imagin2020 CT abdomen and pelvis renal stone protocol:  This is a report only but mentions a moderate compression fracture at L3 with slight retropulsion.  Unclear age.    20 L-spine MRI:  T11-12: Unremarkable   T12-L1:  Unremarkable   L1-2: There is only a minimal disc bulge.  There is no spinal canal or significant foraminal stenosis.   L2-3: There is a diffuse disc bulge with osteophytic ridging.  There is no spinal stenosis.  There is mild crowding of the left lateral recess.  There is moderate right and mild left foraminal stenosis.   L3-4: There is a mild diffuse disc bulge.  There is mild facet joint arthropathy.  There is no spinal stenosis.  There is mild right foraminal stenosis.   L4-5: There is a mild diffuse disc bulge and mild facet joint arthropathy.  There is no spinal stenosis.  There is mild right foraminal stenosis.   L5-S1: There is mild facet joint arthropathy.  There is a synovial cyst posterior to the right facet joint.  There is a mild disc bulge with osteophytic ridging contributing to moderate right and mild-to-moderate left foraminal stenosis.  There is no spinal stenosis.   There is edema throughout the L2 and L3 vertebral bodies.  There is approximately 20% loss of vertebral body height of L2 and 40% loss of vertebral body height at the L3 level worse towards the left.      Assessment:  The patient is an 80-year-old man with a history of pancreatic cancer, tobacco use who presents in referral from Dr. Rajeev Oliver for back pain.    1. Lumbar radiculopathy     2. DDD (degenerative disc disease), lumbar     3. Compression fracture of T12 vertebra with routine healing, subsequent encounter  X-Ray Thoracolumbar Spine AP Lateral       Plan:  1.  I have ordered a new thoracolumbar spine x-ray and he does have a new T11 compression fracture.  However the majority of his pain is in the lower lumbar region.  I have reviewed his case with Dr. Kimbrough and we will schedule him for an L5/S1 interlaminar epidural steroid injection.  2.  Dr. Kimbrough has refilled hydromorphone 2 milligrams.  I have reviewed the [place] [employer] website and there are no abberancies.    3.  Follow-up in 3-4 weeks  postprocedure or sooner as needed.      Greater than 50% of this 30 minutes visit was spent counseling the patient.

## 2020-08-25 NOTE — PROGRESS NOTES
This note was completed with dictation software and grammatical errors may exist.    CC:  Back pain    HPI:  The patient is an 80-year-old man with a history of pancreatic cancer, tobacco use who presents in referral from Dr. Rajeev Oliver for back pain.  He is status post T12 and L1 kyphoplasty on 8/12/20 with no relief.  He is new to me.  He reports worsening low back pain but points to the lower lumbar region.  This is severe unless he is laying flat on his back. He reports minimal relief with hydromorphone.  He complains of left leg weakness but no radicular pain.  He describes spasms in his low back. He denies numbness or incontinence.     Previous history:  The patient reports that 2 months ago he was lifting a trailer and felt a sudden back pain.  Since then he has had continued severe back pain.  Is located in the low back and into the sacrum.  He states that it radiates out to the side slightly but denies any radiation into the legs.  He describes it as burning, throbbing, grabbing, tight, tingling, numb, sharp, electric shooting and hot and cold.  It is worse with prolonged sitting or standing, doing any walking, bending, flexing, lifting and with getting out of a bed or a chair, causes difficulty with trying to stand up, he feels weak in the legs.  He has been mostly sitting in a wheelchair, not walking much on his own, having difficulty timing issues and getting dressed.  He gets some relief with lying down and with medications.  When he 1st was injured, this was diagnosis of possible muscle strain, he continued to have severe pain however.  He ended up developing what sounds like a urinary tract infection and had been admitted to a hospital where they found renal calculi but on a CT scan they also noted an L3 compression fracture of unclear age but he did not have this several months prior according to other imaging of that area.    Pain intervention history: He is status post L2 and L3 kyphoplasty  on 08/03/2020.  He is status post T12 and L1 kyphoplasty on 8/12/20 with no relief.     Antineuropathics:  NSAIDs:  Physical therapy:  Antidepressants:  Muscle relaxers:  Opioids: He has been taking hydrocodone 7.5/325 every 6 hours and states that it does not help much.  He was actually started on tramadol, went to 5 milligram hydrocodone and eventually on 7.5 and he states that it does nothing.  Currently taking hydromorphone 2mg  Antiplatelets/Anticoagulants:    ROS:  He reports weight loss, vision change, eye pain, cough, appetite change, diarrhea, easy bruising, urinary urgency, renal calculi, loss of balance.  Balance of review systems is negative.    Lab Results   Component Value Date    LABA1C 7.1 01/06/2020    HGBA1C 7.5 (H) 04/20/2020       Lab Results   Component Value Date    WBC 6.74 07/17/2020    HGB 10.7 (L) 07/17/2020    HCT 32.2 (L) 07/17/2020    MCV 89 07/17/2020     07/17/2020         Past Medical History:   Diagnosis Date    Abnormal PSA     Basal cell carcinoma of left ear 12/2/2015    Closed fracture of left tibial plateau with routine healing s/p ORIF on 7/15/2016 7/5/2016    Coronary artery disease (CAD) excluded 10/22/2018    DVT (deep venous thrombosis)     Essential hypertension, benign 12/2/2015    Hyperlipidemia     Pancreatic cancer 10/2018    Thyroid disease     Type 2 diabetes mellitus with complication 12/2/2015       Past Surgical History:   Procedure Laterality Date    BASAL CELL CARCINOMA EXCISION  03/17/2017    BLADDER SURGERY      COLONOSCOPY N/A 9/7/2018    Procedure: COLONOSCOPY;  Surgeon: Olu Ahn MD;  Location: [facility] [place] ENDO;  Service: Endoscopy;  Laterality: N/A;    CORONARY ANGIOGRAPHY N/A 10/22/2018    Procedure: ANGIOGRAM, CORONARY ARTERY;  Surgeon: Kevin Huizar MD;  Location: [facility] [place] CATH;  Service: Cardiovascular;  Laterality: N/A;    ENDOSCOPIC ULTRASOUND OF UPPER GASTROINTESTINAL TRACT Left 9/11/2018    Procedure: ULTRASOUND, ENDOSCOPIC,  UPPER GI TRACT;  Surgeon: Joss May MD;  Location: [facility] ENDO;  Service: Endoscopy;  Laterality: Left;  Linear scope    ESOPHAGOGASTRODUODENOSCOPY N/A 9/11/2018    Procedure: EGD (ESOPHAGOGASTRODUODENOSCOPY);  Surgeon: Joss May MD;  Location: [facility] ENDO;  Service: Endoscopy;  Laterality: N/A;    EXTERNAL FIXATION TIBIAL FRACTURE  07/05/2016    FIXATION KYPHOPLASTY N/A 8/3/2020    Procedure: Kyphoplasty L2 and L3;  Surgeon: Galindo Kimbrough MD;  Location: [facility] OR;  Service: Pain Management;  Laterality: N/A;    FIXATION KYPHOPLASTY N/A 8/12/2020    Procedure: Kyphoplasty T12 and L1;  Surgeon: Galindo Kimbrough MD;  Location: [facility] OR;  Service: Pain Management;  Laterality: N/A;    HERNIA REPAIR      LEFT HEART CATHETERIZATION Left 10/22/2018    Procedure: Left heart cath;  Surgeon: Kevin Huizar MD;  Location: [facility] CATH;  Service: Cardiovascular;  Laterality: Left;    ORIF TIBIA FRACTURE  07/15/2016    PANCREAS SURGERY  10/31/2018    PARTIAL HIP ARTHROPLASTY      PROSTATE BIOPSY         Social History     Socioeconomic History    Marital status:      Spouse name: Not on file    Number of children: Not on file    Years of education: Not on file    Highest education level: Not on file   Occupational History    Not on file   Social Needs    Financial resource strain: Not on file    Food insecurity     Worry: Not on file     Inability: Not on file    Transportation needs     Medical: Not on file     Non-medical: Not on file   Tobacco Use    Smoking status: Current Every Day Smoker     Packs/day: 2.00     Years: 60.00     Pack years: 120.00    Smokeless tobacco: Never Used   Substance and Sexual Activity    Alcohol use: No     Alcohol/week: 0.0 standard drinks    Drug use: No    Sexual activity: Not on file   Lifestyle    Physical activity     Days per week: Not on file     Minutes per session: Not on file    Stress: Not on file   Relationships    Social  connections     Talks on phone: Not on file     Gets together: Not on file     Attends [employer] service: Not on file     Active member of club or organization: Not on file     Attends meetings of clubs or organizations: Not on file     Relationship status: Not on file   Other Topics Concern    Not on file   Social History Narrative    Not on file         Medications/Allergies: See med card    Vitals:    20 1501   BP: (!) 174/88   Pulse: 87   Resp: 18   PainSc: 10-Worst pain ever   PainLoc: Back         Physical exam:  Gen: A and O x3, pleasant, well-groomed  Skin: No rashes or obvious lesions  HEENT: PERRLA, no obvious deformities on ears or in canals. Trachea midline.  CVS: Regular rate and rhythm, normal palpable pulses.  Resp:No increased work of breathing, symmetrical chest rise.  Abdomen: Soft, NT/ND.  Musculoskeletal:  Presents in a wheelchair, able to stand but difficulty with balance.    Neuro:  Lower extremities: 5/5 strength bilaterally, except for hip flexion 4/5 bilaterally  Reflexes: Patellar 1+, Achilles 1+ bilaterally.  Sensory:  Intact and symmetrical to light touch and pinprick in L2-S1 dermatomes bilaterally.    Lumbar spine:  Lumbar spine:  Range of motion severely reduced with both flexion and extension with increased pain especially on extension.  Jamir's test deferred  Supine straight leg raise is negative bilaterally.    Internal and external rotation of the hip causes no increased pain on either side.  Myofascial exam:  Moderate tenderness to palpation to the lumbar paraspinous muscles.  No tenderness to palpation or percussion to the lumbar spinous processes.  Mild tenderness to palpation and percussion to the T10, T11 spinous processes.    Imagin2020 CT abdomen and pelvis renal stone protocol:  This is a report only but mentions a moderate compression fracture at L3 with slight retropulsion.  Unclear age.    20 L-spine MRI:  T11-12: Unremarkable   T12-L1:  Unremarkable   L1-2: There is only a minimal disc bulge.  There is no spinal canal or significant foraminal stenosis.   L2-3: There is a diffuse disc bulge with osteophytic ridging.  There is no spinal stenosis.  There is mild crowding of the left lateral recess.  There is moderate right and mild left foraminal stenosis.   L3-4: There is a mild diffuse disc bulge.  There is mild facet joint arthropathy.  There is no spinal stenosis.  There is mild right foraminal stenosis.   L4-5: There is a mild diffuse disc bulge and mild facet joint arthropathy.  There is no spinal stenosis.  There is mild right foraminal stenosis.   L5-S1: There is mild facet joint arthropathy.  There is a synovial cyst posterior to the right facet joint.  There is a mild disc bulge with osteophytic ridging contributing to moderate right and mild-to-moderate left foraminal stenosis.  There is no spinal stenosis.   There is edema throughout the L2 and L3 vertebral bodies.  There is approximately 20% loss of vertebral body height of L2 and 40% loss of vertebral body height at the L3 level worse towards the left.      Assessment:  The patient is an 80-year-old man with a history of pancreatic cancer, tobacco use who presents in referral from Dr. Rajeev Oliver for back pain.    1. Lumbar radiculopathy     2. DDD (degenerative disc disease), lumbar     3. Compression fracture of T12 vertebra with routine healing, subsequent encounter  X-Ray Thoracolumbar Spine AP Lateral       Plan:  1.  I have ordered a new thoracolumbar spine x-ray and he does have a new T11 compression fracture.  However the majority of his pain is in the lower lumbar region.  I have reviewed his case with Dr. Kimbrough and we will schedule him for an L5/S1 interlaminar epidural steroid injection.  2.  Dr. Kimbrough has refilled hydromorphone 2 milligrams.  I have reviewed the [place] Board [employer] Pharmacy website and there are no abberancies.    3.  Follow-up in 3-4 weeks  postprocedure or sooner as needed.      Greater than 50% of this 30 minutes visit was spent counseling the patient.

## 2020-08-25 NOTE — OP NOTE

## 2020-08-25 NOTE — PLAN OF CARE
No apparent signs and/or symptoms of distress noted at this time. No complaints voiced at this time. Discharge instructions reviewed with patient/family/friend with good verbal feedback received. Patient ready for discharge

## 2020-09-04 ENCOUNTER — OFFICE VISIT (OUTPATIENT)
Dept: PAIN MEDICINE | Facility: CLINIC | Age: 80
End: 2020-09-04
Payer: MEDICARE

## 2020-09-04 VITALS — HEART RATE: 76 BPM | DIASTOLIC BLOOD PRESSURE: 81 MMHG | SYSTOLIC BLOOD PRESSURE: 125 MMHG

## 2020-09-04 DIAGNOSIS — M51.36 DDD (DEGENERATIVE DISC DISEASE), LUMBAR: ICD-10-CM

## 2020-09-04 DIAGNOSIS — S22.080D COMPRESSION FRACTURE OF T12 VERTEBRA WITH ROUTINE HEALING, SUBSEQUENT ENCOUNTER: ICD-10-CM

## 2020-09-04 DIAGNOSIS — M54.16 LUMBAR RADICULOPATHY: Primary | ICD-10-CM

## 2020-09-04 PROCEDURE — 99999 PR PBB SHADOW E&M-EST. PATIENT-LVL III: CPT | Mod: PBBFAC,,, | Performed by: PHYSICIAN ASSISTANT

## 2020-09-04 PROCEDURE — 99213 OFFICE O/P EST LOW 20 MIN: CPT | Mod: PBBFAC,PN | Performed by: PHYSICIAN ASSISTANT

## 2020-09-04 PROCEDURE — 99214 PR OFFICE/OUTPT VISIT, EST, LEVL IV, 30-39 MIN: ICD-10-PCS | Mod: S$PBB,,, | Performed by: PHYSICIAN ASSISTANT

## 2020-09-04 PROCEDURE — 99214 OFFICE O/P EST MOD 30 MIN: CPT | Mod: S$PBB,,, | Performed by: PHYSICIAN ASSISTANT

## 2020-09-04 PROCEDURE — 99999 PR PBB SHADOW E&M-EST. PATIENT-LVL III: ICD-10-PCS | Mod: PBBFAC,,, | Performed by: PHYSICIAN ASSISTANT

## 2020-09-07 NOTE — PROGRESS NOTES
This note was completed with dictation software and grammatical errors may exist.    CC:  Back pain    HPI:  The patient is an 80-year-old man with a history of pancreatic cancer, tobacco use who presents in referral from Dr. Rajeev Oliver for back pain.  He is status post L5/S1 interlaminar epidural steroid injection on 08/25/2020 with moderate relief.  The patient states that today was the easiest drive he has had since he started having back pain.  However, he still has significant pain with movement.  He is able to get around a little better and his appetite has improved.  He continues to have pain across the lower lumbar region, denies any major thoracic back pain at this time.  He reports generalized weakness due to inactivity.  He denies numbness or incontinence.  He continues to take pain medicine but feels that he needs this less frequently.    Previous history:  The patient reports that 2 months ago he was lifting a trailer and felt a sudden back pain.  Since then he has had continued severe back pain.  Is located in the low back and into the sacrum.  He states that it radiates out to the side slightly but denies any radiation into the legs.  He describes it as burning, throbbing, grabbing, tight, tingling, numb, sharp, electric shooting and hot and cold.  It is worse with prolonged sitting or standing, doing any walking, bending, flexing, lifting and with getting out of a bed or a chair, causes difficulty with trying to stand up, he feels weak in the legs.  He has been mostly sitting in a wheelchair, not walking much on his own, having difficulty timing issues and getting dressed.  He gets some relief with lying down and with medications.  When he 1st was injured, this was diagnosis of possible muscle strain, he continued to have severe pain however.  He ended up developing what sounds like a urinary tract infection and had been admitted to a hospital where they found renal calculi but on a CT scan  they also noted an L3 compression fracture of unclear age but he did not have this several months prior according to other imaging of that area.    Pain intervention history: He is status post L2 and L3 kyphoplasty on 08/03/2020.  He is status post T12 and L1 kyphoplasty on 8/12/20 with no relief.  He is status post L5/S1 interlaminar epidural steroid injection on 08/25/2020 with moderate relief.     Antineuropathics:  NSAIDs:  Physical therapy:  Antidepressants:  Muscle relaxers:  Opioids: He has been taking hydrocodone 7.5/325 every 6 hours and states that it does not help much.  He was actually started on tramadol, went to 5 milligram hydrocodone and eventually on 7.5 and he states that it does nothing.  Currently taking hydromorphone 2mg  Antiplatelets/Anticoagulants:    ROS:  He reports weight loss, vision change, eye pain, cough, appetite change, diarrhea, easy bruising, urinary urgency, renal calculi, loss of balance.  Balance of review systems is negative.    Lab Results   Component Value Date    LABA1C 7.1 01/06/2020    HGBA1C 7.5 (H) 04/20/2020       Lab Results   Component Value Date    WBC 6.74 07/17/2020    HGB 10.7 (L) 07/17/2020    HCT 32.2 (L) 07/17/2020    MCV 89 07/17/2020     07/17/2020         Past Medical History:   Diagnosis Date    Abnormal PSA     Basal cell carcinoma of left ear 12/2/2015    Closed fracture of left tibial plateau with routine healing s/p ORIF on 7/15/2016 7/5/2016    Coronary artery disease (CAD) excluded 10/22/2018    DVT (deep venous thrombosis)     Essential hypertension, benign 12/2/2015    Hyperlipidemia     Pancreatic cancer 10/2018    Thyroid disease     Type 2 diabetes mellitus with complication 12/2/2015       Past Surgical History:   Procedure Laterality Date    BASAL CELL CARCINOMA EXCISION  03/17/2017    BLADDER SURGERY      COLONOSCOPY N/A 9/7/2018    Procedure: COLONOSCOPY;  Surgeon: Olu Ahn MD;  Location: [facility];  Service:  Endoscopy;  Laterality: N/A;    CORONARY ANGIOGRAPHY N/A 10/22/2018    Procedure: ANGIOGRAM, CORONARY ARTERY;  Surgeon: Kevin Huizar MD;  Location: [facility] CATH;  Service: Cardiovascular;  Laterality: N/A;    ENDOSCOPIC ULTRASOUND OF UPPER GASTROINTESTINAL TRACT Left 9/11/2018    Procedure: ULTRASOUND, ENDOSCOPIC, UPPER GI TRACT;  Surgeon: Joss May MD;  Location: [facility] ENDO;  Service: Endoscopy;  Laterality: Left;  Linear scope    EPIDURAL STEROID INJECTION INTO LUMBAR SPINE N/A 8/25/2020    Procedure: Injection-steroid-epidural-lumbar, L5/S1;  Surgeon: Galindo Kimbrough MD;  Location: [facility] OR;  Service: Pain Management;  Laterality: N/A;    ESOPHAGOGASTRODUODENOSCOPY N/A 9/11/2018    Procedure: EGD (ESOPHAGOGASTRODUODENOSCOPY);  Surgeon: Joss May MD;  Location: [facility];  Service: Endoscopy;  Laterality: N/A;    EXTERNAL FIXATION TIBIAL FRACTURE  07/05/2016    FIXATION KYPHOPLASTY N/A 8/3/2020    Procedure: Kyphoplasty L2 and L3;  Surgeon: Galindo Kimbrough MD;  Location: [facility] OR;  Service: Pain Management;  Laterality: N/A;    FIXATION KYPHOPLASTY N/A 8/12/2020    Procedure: Kyphoplasty T12 and L1;  Surgeon: Galindo Kimbrough MD;  Location: [facility] OR;  Service: Pain Management;  Laterality: N/A;    HERNIA REPAIR      LEFT HEART CATHETERIZATION Left 10/22/2018    Procedure: Left heart cath;  Surgeon: Kevin Huizar MD;  Location: [facility];  Service: Cardiovascular;  Laterality: Left;    ORIF TIBIA FRACTURE  07/15/2016    PANCREAS SURGERY  10/31/2018    PARTIAL HIP ARTHROPLASTY      PROSTATE BIOPSY         Social History     Socioeconomic History    Marital status:      Spouse name: Not on file    Number of children: Not on file    Years of education: Not on file    Highest education level: Not on file   Occupational History    Not on file   Social Needs    Financial resource strain: Not on file    Food insecurity     Worry: Not on file     Inability: Not on  file    Transportation needs     Medical: Not on file     Non-medical: Not on file   Tobacco Use    Smoking status: Current Every Day Smoker     Packs/day: 2.00     Years: 60.00     Pack years: 120.00    Smokeless tobacco: Never Used   Substance and Sexual Activity    Alcohol use: No     Alcohol/week: 0.0 standard drinks    Drug use: No    Sexual activity: Not on file   Lifestyle    Physical activity     Days per week: Not on file     Minutes per session: Not on file    Stress: Not on file   Relationships    Social connections     Talks on phone: Not on file     Gets together: Not on file     Attends [employer] service: Not on file     Active member of club or organization: Not on file     Attends meetings of clubs or organizations: Not on file     Relationship status: Not on file   Other Topics Concern    Not on file   Social History Narrative    Not on file         Medications/Allergies: See med card    Vitals:    09/04/20 1048   BP: 125/81   Pulse: 76   PainSc:   8   PainLoc: Back         Physical exam:  Gen: A and O x3, pleasant, well-groomed  Skin: No rashes or obvious lesions  HEENT: PERRLA, no obvious deformities on ears or in canals. Trachea midline.  CVS: Regular rate and rhythm, normal palpable pulses.  Resp:No increased work of breathing, symmetrical chest rise.  Abdomen: Soft, NT/ND.  Musculoskeletal:  Presents in a wheelchair, able to stand but difficulty with balance.    Neuro:  Lower extremities: 5/5 strength bilaterally, except for hip flexion 4/5 bilaterally  Reflexes: Patellar 1+, Achilles 1+ bilaterally.  Sensory:  Intact and symmetrical to light touch and pinprick in L2-S1 dermatomes bilaterally.    Lumbar spine:  Lumbar spine:  Range of motion severely reduced with both flexion and extension with increased pain especially on extension.  Jamir's test deferred  Supine straight leg raise is negative bilaterally.    Internal and external rotation of the hip causes no increased pain on  either side.  Myofascial exam:  Moderate tenderness to palpation to the lumbar paraspinous muscles.  No tenderness to palpation or percussion to the lumbar spinous processes.  Mild tenderness to palpation and percussion to the T10, T11 spinous processes.    Imagin2020 CT abdomen and pelvis renal stone protocol:  This is a report only but mentions a moderate compression fracture at L3 with slight retropulsion.  Unclear age.    20 L-spine MRI:  T11-12: Unremarkable   T12-L1: Unremarkable   L1-2: There is only a minimal disc bulge.  There is no spinal canal or significant foraminal stenosis.   L2-3: There is a diffuse disc bulge with osteophytic ridging.  There is no spinal stenosis.  There is mild crowding of the left lateral recess.  There is moderate right and mild left foraminal stenosis.   L3-4: There is a mild diffuse disc bulge.  There is mild facet joint arthropathy.  There is no spinal stenosis.  There is mild right foraminal stenosis.   L4-5: There is a mild diffuse disc bulge and mild facet joint arthropathy.  There is no spinal stenosis.  There is mild right foraminal stenosis.   L5-S1: There is mild facet joint arthropathy.  There is a synovial cyst posterior to the right facet joint.  There is a mild disc bulge with osteophytic ridging contributing to moderate right and mild-to-moderate left foraminal stenosis.  There is no spinal stenosis.   There is edema throughout the L2 and L3 vertebral bodies.  There is approximately 20% loss of vertebral body height of L2 and 40% loss of vertebral body height at the L3 level worse towards the left.      Assessment:  The patient is an 80-year-old man with a history of pancreatic cancer, tobacco use who presents in referral from Dr. Rajeev Oliver for back pain.    1. Lumbar radiculopathy     2. DDD (degenerative disc disease), lumbar     3. Compression fracture of T12 vertebra with routine healing, subsequent encounter         Plan:  1.  Had a long  discussion with the patient and his family in regards to safe activity.  He has had some improvement from the NEHEMIAH and we discussed that we should not kyphoplasty his T11 fracture because he will likely developed more compression fractures.  This will need to heal with time.  We also discussed the importance of nutrition and his appetite has improved now that his pain is somewhat better following the NEHEMIAH.  He continues to take hydromorphone for pain relief and we discussed keeping this to a minimum and he will call for his next prescription.  2.  Follow-up in 2 months or sooner as needed.    Greater than 50% of this 30 minutes visit was spent counseling the patient.

## 2020-09-11 RX ORDER — HYDROMORPHONE HYDROCHLORIDE 2 MG/1
2 TABLET ORAL EVERY 6 HOURS PRN
Qty: 30 TABLET | Refills: 0 | Status: SHIPPED | OUTPATIENT
Start: 2020-09-11 | End: 2020-09-22 | Stop reason: SDUPTHER

## 2020-09-11 NOTE — TELEPHONE ENCOUNTER
----- Message from Mary Walsh sent at 9/11/2020  8:39 AM CDT -----  Regarding: refill  Contact: Wife, corina  Type:  RX Refill Request    Who Called:  Patient   Refill or New Rx:  Refill  RX Name and Strength:  DILAUDID) 2 MG   How is the patient currently taking it? (ex. 1XDay):  one every 6 hours    Is this a 30 day or 90 day RX:  30day  Preferred Pharmacy with phone number:    [facility] - [place] [address] [address]  Phone: [phone] Fax: [phone]     Local or Mail Order:  Local  Ordering Provider:  Dr Kimbrough

## 2020-09-15 NOTE — TELEPHONE ENCOUNTER
Attempted to call patient to let him know that his medication has been refilled. Left v/m for patient.

## 2020-09-22 RX ORDER — HYDROMORPHONE HYDROCHLORIDE 2 MG/1
2 TABLET ORAL EVERY 6 HOURS PRN
Qty: 30 TABLET | Refills: 0 | Status: SHIPPED | OUTPATIENT
Start: 2020-09-22 | End: 2020-09-28 | Stop reason: SDUPTHER

## 2020-09-28 ENCOUNTER — OFFICE VISIT (OUTPATIENT)
Dept: PAIN MEDICINE | Facility: CLINIC | Age: 80
End: 2020-09-28
Payer: MEDICARE

## 2020-09-28 VITALS
OXYGEN SATURATION: 96 % | TEMPERATURE: 98 F | SYSTOLIC BLOOD PRESSURE: 163 MMHG | HEART RATE: 69 BPM | DIASTOLIC BLOOD PRESSURE: 74 MMHG | RESPIRATION RATE: 18 BRPM

## 2020-09-28 DIAGNOSIS — S22.080D COMPRESSION FRACTURE OF T12 VERTEBRA WITH ROUTINE HEALING, SUBSEQUENT ENCOUNTER: ICD-10-CM

## 2020-09-28 DIAGNOSIS — Z11.9 SCREENING EXAMINATION FOR INFECTIOUS DISEASE: ICD-10-CM

## 2020-09-28 DIAGNOSIS — M54.16 LUMBAR RADICULOPATHY: Primary | ICD-10-CM

## 2020-09-28 DIAGNOSIS — M51.36 DDD (DEGENERATIVE DISC DISEASE), LUMBAR: ICD-10-CM

## 2020-09-28 PROCEDURE — 99999 PR PBB SHADOW E&M-EST. PATIENT-LVL IV: CPT | Mod: PBBFAC,,, | Performed by: PHYSICIAN ASSISTANT

## 2020-09-28 PROCEDURE — 99214 OFFICE O/P EST MOD 30 MIN: CPT | Mod: PBBFAC,PN | Performed by: PHYSICIAN ASSISTANT

## 2020-09-28 PROCEDURE — 99214 OFFICE O/P EST MOD 30 MIN: CPT | Mod: S$PBB,,, | Performed by: PHYSICIAN ASSISTANT

## 2020-09-28 PROCEDURE — 99999 PR PBB SHADOW E&M-EST. PATIENT-LVL IV: ICD-10-PCS | Mod: PBBFAC,,, | Performed by: PHYSICIAN ASSISTANT

## 2020-09-28 PROCEDURE — 99214 PR OFFICE/OUTPT VISIT, EST, LEVL IV, 30-39 MIN: ICD-10-PCS | Mod: S$PBB,,, | Performed by: PHYSICIAN ASSISTANT

## 2020-09-28 RX ORDER — ALPRAZOLAM 0.5 MG/1
0.5 TABLET, ORALLY DISINTEGRATING ORAL ONCE AS NEEDED
Status: CANCELLED | OUTPATIENT
Start: 2020-10-19 | End: 2032-03-16

## 2020-09-28 RX ORDER — HYDROMORPHONE HYDROCHLORIDE 2 MG/1
2 TABLET ORAL EVERY 6 HOURS PRN
Qty: 60 TABLET | Refills: 0 | Status: SHIPPED | OUTPATIENT
Start: 2020-09-28 | End: 2020-10-20 | Stop reason: SDUPTHER

## 2020-09-30 RX ORDER — HYDROMORPHONE HYDROCHLORIDE 2 MG/1
2 TABLET ORAL EVERY 6 HOURS PRN
Qty: 60 TABLET | Refills: 0 | Status: CANCELLED | OUTPATIENT
Start: 2020-09-30

## 2020-10-02 PROBLEM — K92.1 MELENA: Status: ACTIVE | Noted: 2020-10-02

## 2020-10-04 NOTE — H&P (VIEW-ONLY)
This note was completed with dictation software and grammatical errors may exist.    CC:  Back pain    HPI:  The patient is an 80-year-old man with a history of pancreatic cancer, tobacco use who presents in referral from Dr. Rajeev Oliver for back pain.  He returns in follow-up today with severe low back pain.  He had done well following the lumbar NEHEMIAH but feels that this is starting to wear off.  He does report some sustained benefit from the injection.  His pain is located across the low back in the lower lumbar region.  He denies any pain in the thoracic region at this time.  He denies any radiation into his legs.  He continues to take pain medicine with relief.  He is undergoing abdominal workup and is going to have a CT of the abdomen and pelvis as well as an EGD.  He reports weakness because of inactivity.  His pain is worse with any activity and improved with lying down and medication.  He denies numbness, bladder or bowel incontinence.    Previous history:  The patient reports that 2 months ago he was lifting a trailer and felt a sudden back pain.  Since then he has had continued severe back pain.  Is located in the low back and into the sacrum.  He states that it radiates out to the side slightly but denies any radiation into the legs.  He describes it as burning, throbbing, grabbing, tight, tingling, numb, sharp, electric shooting and hot and cold.  It is worse with prolonged sitting or standing, doing any walking, bending, flexing, lifting and with getting out of a bed or a chair, causes difficulty with trying to stand up, he feels weak in the legs.  He has been mostly sitting in a wheelchair, not walking much on his own, having difficulty timing issues and getting dressed.  He gets some relief with lying down and with medications.  When he 1st was injured, this was diagnosis of possible muscle strain, he continued to have severe pain however.  He ended up developing what sounds like a urinary tract  infection and had been admitted to a hospital where they found renal calculi but on a CT scan they also noted an L3 compression fracture of unclear age but he did not have this several months prior according to other imaging of that area.    Pain intervention history: He is status post L2 and L3 kyphoplasty on 08/03/2020.  He is status post T12 and L1 kyphoplasty on 8/12/20 with no relief.  He is status post L5/S1 interlaminar epidural steroid injection on 08/25/2020 with moderate relief.     Antineuropathics:  NSAIDs:  Physical therapy:  Antidepressants:  Muscle relaxers:  Opioids: He has been taking hydrocodone 7.5/325 every 6 hours and states that it does not help much.  He was actually started on tramadol, went to 5 milligram hydrocodone and eventually on 7.5 and he states that it does nothing.  Currently taking hydromorphone 2mg  Antiplatelets/Anticoagulants:    ROS:  He reports weight loss, vision change, eye pain, cough, appetite change, diarrhea, easy bruising, urinary urgency, renal calculi, loss of balance.  Balance of review systems is negative.    Lab Results   Component Value Date    LABA1C 7.1 01/06/2020    HGBA1C 6.4 (H) 09/16/2020       Lab Results   Component Value Date    WBC 9.31 09/16/2020    HGB 13.3 (L) 09/16/2020    HCT 41.7 09/16/2020    MCV 91 09/16/2020     09/16/2020         Past Medical History:   Diagnosis Date    Abnormal PSA     Basal cell carcinoma of left ear 12/2/2015    Closed fracture of left tibial plateau with routine healing s/p ORIF on 7/15/2016 7/5/2016    Coronary artery disease (CAD) excluded 10/22/2018    DVT (deep venous thrombosis)     Essential hypertension, benign 12/2/2015    Hyperlipidemia     Pancreatic cancer 10/2018    Thyroid disease     Type 2 diabetes mellitus with complication 12/2/2015       Past Surgical History:   Procedure Laterality Date    BASAL CELL CARCINOMA EXCISION  03/17/2017    BLADDER SURGERY      COLONOSCOPY N/A 9/7/2018     Procedure: COLONOSCOPY;  Surgeon: Olu Ahn MD;  Location: [facility];  Service: Endoscopy;  Laterality: N/A;    CORONARY ANGIOGRAPHY N/A 10/22/2018    Procedure: ANGIOGRAM, CORONARY ARTERY;  Surgeon: Kevin Huizar MD;  Location: [facility];  Service: Cardiovascular;  Laterality: N/A;    ENDOSCOPIC ULTRASOUND OF UPPER GASTROINTESTINAL TRACT Left 9/11/2018    Procedure: ULTRASOUND, ENDOSCOPIC, UPPER GI TRACT;  Surgeon: Joss May MD;  Location: [facility];  Service: Endoscopy;  Laterality: Left;  Linear scope    EPIDURAL STEROID INJECTION INTO LUMBAR SPINE N/A 8/25/2020    Procedure: Injection-steroid-epidural-lumbar, L5/S1;  Surgeon: Galindo Kimbrough MD;  Location: [facility] OR;  Service: Pain Management;  Laterality: N/A;    ESOPHAGOGASTRODUODENOSCOPY N/A 9/11/2018    Procedure: EGD (ESOPHAGOGASTRODUODENOSCOPY);  Surgeon: Joss May MD;  Location: [facility];  Service: Endoscopy;  Laterality: N/A;    EXTERNAL FIXATION TIBIAL FRACTURE  07/05/2016    FIXATION KYPHOPLASTY N/A 8/3/2020    Procedure: Kyphoplasty L2 and L3;  Surgeon: Galindo Kimbrough MD;  Location: [facility] OR;  Service: Pain Management;  Laterality: N/A;    FIXATION KYPHOPLASTY N/A 8/12/2020    Procedure: Kyphoplasty T12 and L1;  Surgeon: Galindo Kimbrough MD;  Location: [facility] OR;  Service: Pain Management;  Laterality: N/A;    HERNIA REPAIR      LEFT HEART CATHETERIZATION Left 10/22/2018    Procedure: Left heart cath;  Surgeon: Kevin Huizar MD;  Location: [facility];  Service: Cardiovascular;  Laterality: Left;    ORIF TIBIA FRACTURE  07/15/2016    PANCREAS SURGERY  10/31/2018    PARTIAL HIP ARTHROPLASTY      PROSTATE BIOPSY         Social History     Socioeconomic History    Marital status:      Spouse name: Not on file    Number of children: Not on file    Years of education: Not on file    Highest education level: Not on file   Occupational History    Not on file   Social Needs    Financial  resource strain: Not on file    Food insecurity     Worry: Not on file     Inability: Not on file    Transportation needs     Medical: Not on file     Non-medical: Not on file   Tobacco Use    Smoking status: Current Every Day Smoker     Packs/day: 2.00     Years: 60.00     Pack years: 120.00    Smokeless tobacco: Never Used   Substance and Sexual Activity    Alcohol use: No     Alcohol/week: 0.0 standard drinks    Drug use: No    Sexual activity: Not on file   Lifestyle    Physical activity     Days per week: Not on file     Minutes per session: Not on file    Stress: Not on file   Relationships    Social connections     Talks on phone: Not on file     Gets together: Not on file     Attends [employer] service: Not on file     Active member of club or organization: Not on file     Attends meetings of clubs or organizations: Not on file     Relationship status: Not on file   Other Topics Concern    Not on file   Social History Narrative    Not on file         Medications/Allergies: See med card    Vitals:    09/28/20 1134   BP: (!) 163/74   Pulse: 69   Resp: 18   Temp: 97.8 °F (36.6 °C)   TempSrc: Temporal   SpO2: 96%   PainSc:   8   PainLoc: Back         Physical exam:  Gen: A and O x3, pleasant, well-groomed  Skin: No rashes or obvious lesions  HEENT: PERRLA, no obvious deformities on ears or in canals. Trachea midline.  CVS: Regular rate and rhythm, normal palpable pulses.  Resp:No increased work of breathing, symmetrical chest rise.  Abdomen: Soft, NT/ND.  Musculoskeletal:  Presents in a wheelchair, able to stand but difficulty with balance.    Neuro:  Lower extremities: 5/5 strength bilaterally, except for hip flexion 4/5 bilaterally  Reflexes: Patellar 1+, Achilles 1+ bilaterally.  Sensory:  Intact and symmetrical to light touch and pinprick in L2-S1 dermatomes bilaterally.    Lumbar spine:  Lumbar spine:  Range of motion severely reduced with both flexion and extension with increased pain especially  on extension.  Jamir's test deferred  Supine straight leg raise is negative bilaterally.    Internal and external rotation of the hip causes no increased pain on either side.  Myofascial exam:  Moderate tenderness to palpation to the lower lumbar paraspinous muscles.  No tenderness to palpation or percussion to the lumbar spinous processes.  No tenderness to palpation to the thoracic paraspinous muscles or spinous processes.    Imagin2020 CT abdomen and pelvis renal stone protocol:  This is a report only but mentions a moderate compression fracture at L3 with slight retropulsion.  Unclear age.    20 L-spine MRI:  T11-12: Unremarkable   T12-L1: Unremarkable   L1-2: There is only a minimal disc bulge.  There is no spinal canal or significant foraminal stenosis.   L2-3: There is a diffuse disc bulge with osteophytic ridging.  There is no spinal stenosis.  There is mild crowding of the left lateral recess.  There is moderate right and mild left foraminal stenosis.   L3-4: There is a mild diffuse disc bulge.  There is mild facet joint arthropathy.  There is no spinal stenosis.  There is mild right foraminal stenosis.   L4-5: There is a mild diffuse disc bulge and mild facet joint arthropathy.  There is no spinal stenosis.  There is mild right foraminal stenosis.   L5-S1: There is mild facet joint arthropathy.  There is a synovial cyst posterior to the right facet joint.  There is a mild disc bulge with osteophytic ridging contributing to moderate right and mild-to-moderate left foraminal stenosis.  There is no spinal stenosis.   There is edema throughout the L2 and L3 vertebral bodies.  There is approximately 20% loss of vertebral body height of L2 and 40% loss of vertebral body height at the L3 level worse towards the left.      Assessment:  The patient is an 80-year-old man with a history of pancreatic cancer, tobacco use who presents in referral from Dr. Rajeev Oliver for back pain.    1. Lumbar  radiculopathy  Vital signs    Verify informed consent    Notify physician     Notify physician     Notify physician (specify)    Diet NPO    Place sequential compression device    Case Request Operating Room: Injection-steroid-epidural-lumbar, L5/S1    Place in Outpatient    alprazolam ODT dissolvable tablet 0.5 mg   2. DDD (degenerative disc disease), lumbar     3. Compression fracture of T12 vertebra with routine healing, subsequent encounter     4. Screening examination for infectious disease  COVID-19 Routine Screening       Plan:  1.  I reviewed his case with Dr. Kimbrough and we will set him up to repeat and L5/S1 interlaminar epidural steroid injection a couple weeks after he has his endoscopy.  2.  Dr. Kimbrough provided a prescription for hydromorphone 2 mg, 60 tablets.  I have reviewed the [place] Board of Pharmacy website and there are no abberancies.    3.  Follow-up in 4 weeks postprocedure sooner as needed.    Greater than 50% of this 30 minutes visit was spent counseling the patient.

## 2020-10-04 NOTE — PROGRESS NOTES
This note was completed with dictation software and grammatical errors may exist.    CC:  Back pain    HPI:  The patient is an 80-year-old man with a history of pancreatic cancer, tobacco use who presents in referral from Dr. Rajeev Oliver for back pain.  He returns in follow-up today with severe low back pain.  He had done well following the lumbar NEHEMIAH but feels that this is starting to wear off.  He does report some sustained benefit from the injection.  His pain is located across the low back in the lower lumbar region.  He denies any pain in the thoracic region at this time.  He denies any radiation into his legs.  He continues to take pain medicine with relief.  He is undergoing abdominal workup and is going to have a CT of the abdomen and pelvis as well as an EGD.  He reports weakness because of inactivity.  His pain is worse with any activity and improved with lying down and medication.  He denies numbness, bladder or bowel incontinence.    Previous history:  The patient reports that 2 months ago he was lifting a trailer and felt a sudden back pain.  Since then he has had continued severe back pain.  Is located in the low back and into the sacrum.  He states that it radiates out to the side slightly but denies any radiation into the legs.  He describes it as burning, throbbing, grabbing, tight, tingling, numb, sharp, electric shooting and hot and cold.  It is worse with prolonged sitting or standing, doing any walking, bending, flexing, lifting and with getting out of a bed or a chair, causes difficulty with trying to stand up, he feels weak in the legs.  He has been mostly sitting in a wheelchair, not walking much on his own, having difficulty timing issues and getting dressed.  He gets some relief with lying down and with medications.  When he 1st was injured, this was diagnosis of possible muscle strain, he continued to have severe pain however.  He ended up developing what sounds like a urinary tract  infection and had been admitted to a hospital where they found renal calculi but on a CT scan they also noted an L3 compression fracture of unclear age but he did not have this several months prior according to other imaging of that area.    Pain intervention history: He is status post L2 and L3 kyphoplasty on 08/03/2020.  He is status post T12 and L1 kyphoplasty on 8/12/20 with no relief.  He is status post L5/S1 interlaminar epidural steroid injection on 08/25/2020 with moderate relief.     Antineuropathics:  NSAIDs:  Physical therapy:  Antidepressants:  Muscle relaxers:  Opioids: He has been taking hydrocodone 7.5/325 every 6 hours and states that it does not help much.  He was actually started on tramadol, went to 5 milligram hydrocodone and eventually on 7.5 and he states that it does nothing.  Currently taking hydromorphone 2mg  Antiplatelets/Anticoagulants:    ROS:  He reports weight loss, vision change, eye pain, cough, appetite change, diarrhea, easy bruising, urinary urgency, renal calculi, loss of balance.  Balance of review systems is negative.    Lab Results   Component Value Date    LABA1C 7.1 01/06/2020    HGBA1C 6.4 (H) 09/16/2020       Lab Results   Component Value Date    WBC 9.31 09/16/2020    HGB 13.3 (L) 09/16/2020    HCT 41.7 09/16/2020    MCV 91 09/16/2020     09/16/2020         Past Medical History:   Diagnosis Date    Abnormal PSA     Basal cell carcinoma of left ear 12/2/2015    Closed fracture of left tibial plateau with routine healing s/p ORIF on 7/15/2016 7/5/2016    Coronary artery disease (CAD) excluded 10/22/2018    DVT (deep venous thrombosis)     Essential hypertension, benign 12/2/2015    Hyperlipidemia     Pancreatic cancer 10/2018    Thyroid disease     Type 2 diabetes mellitus with complication 12/2/2015       Past Surgical History:   Procedure Laterality Date    BASAL CELL CARCINOMA EXCISION  03/17/2017    BLADDER SURGERY      COLONOSCOPY N/A 9/7/2018     Procedure: COLONOSCOPY;  Surgeon: Olu Ahn MD;  Location: [facility];  Service: Endoscopy;  Laterality: N/A;    CORONARY ANGIOGRAPHY N/A 10/22/2018    Procedure: ANGIOGRAM, CORONARY ARTERY;  Surgeon: Kevin Huizar MD;  Location: [facility];  Service: Cardiovascular;  Laterality: N/A;    ENDOSCOPIC ULTRASOUND OF UPPER GASTROINTESTINAL TRACT Left 9/11/2018    Procedure: ULTRASOUND, ENDOSCOPIC, UPPER GI TRACT;  Surgeon: Joss May MD;  Location: [facility];  Service: Endoscopy;  Laterality: Left;  Linear scope    EPIDURAL STEROID INJECTION INTO LUMBAR SPINE N/A 8/25/2020    Procedure: Injection-steroid-epidural-lumbar, L5/S1;  Surgeon: Galindo Kimbrough MD;  Location: [facility] OR;  Service: Pain Management;  Laterality: N/A;    ESOPHAGOGASTRODUODENOSCOPY N/A 9/11/2018    Procedure: EGD (ESOPHAGOGASTRODUODENOSCOPY);  Surgeon: Joss May MD;  Location: [facility];  Service: Endoscopy;  Laterality: N/A;    EXTERNAL FIXATION TIBIAL FRACTURE  07/05/2016    FIXATION KYPHOPLASTY N/A 8/3/2020    Procedure: Kyphoplasty L2 and L3;  Surgeon: Galindo Kimbrough MD;  Location: [facility] OR;  Service: Pain Management;  Laterality: N/A;    FIXATION KYPHOPLASTY N/A 8/12/2020    Procedure: Kyphoplasty T12 and L1;  Surgeon: Galindo Kimbrough MD;  Location: [facility] OR;  Service: Pain Management;  Laterality: N/A;    HERNIA REPAIR      LEFT HEART CATHETERIZATION Left 10/22/2018    Procedure: Left heart cath;  Surgeon: Kevin Huizar MD;  Location: [facility];  Service: Cardiovascular;  Laterality: Left;    ORIF TIBIA FRACTURE  07/15/2016    PANCREAS SURGERY  10/31/2018    PARTIAL HIP ARTHROPLASTY      PROSTATE BIOPSY         Social History     Socioeconomic History    Marital status:      Spouse name: Not on file    Number of children: Not on file    Years of education: Not on file    Highest education level: Not on file   Occupational History    Not on file   Social Needs    Financial  resource strain: Not on file    Food insecurity     Worry: Not on file     Inability: Not on file    Transportation needs     Medical: Not on file     Non-medical: Not on file   Tobacco Use    Smoking status: Current Every Day Smoker     Packs/day: 2.00     Years: 60.00     Pack years: 120.00    Smokeless tobacco: Never Used   Substance and Sexual Activity    Alcohol use: No     Alcohol/week: 0.0 standard drinks    Drug use: No    Sexual activity: Not on file   Lifestyle    Physical activity     Days per week: Not on file     Minutes per session: Not on file    Stress: Not on file   Relationships    Social connections     Talks on phone: Not on file     Gets together: Not on file     Attends Uats[employer] service: Not on file     Active member of club or organization: Not on file     Attends meetings of clubs or organizations: Not on file     Relationship status: Not on file   Other Topics Concern    Not on file   Social History Narrative    Not on file         Medications/Allergies: See med card    Vitals:    09/28/20 1134   BP: (!) 163/74   Pulse: 69   Resp: 18   Temp: 97.8 °F (36.6 °C)   TempSrc: Temporal   SpO2: 96%   PainSc:   8   PainLoc: Back         Physical exam:  Gen: A and O x3, pleasant, well-groomed  Skin: No rashes or obvious lesions  HEENT: PERRLA, no obvious deformities on ears or in canals. Trachea midline.  CVS: Regular rate and rhythm, normal palpable pulses.  Resp:No increased work of breathing, symmetrical chest rise.  Abdomen: Soft, NT/ND.  Musculoskeletal:  Presents in a wheelchair, able to stand but difficulty with balance.    Neuro:  Lower extremities: 5/5 strength bilaterally, except for hip flexion 4/5 bilaterally  Reflexes: Patellar 1+, Achilles 1+ bilaterally.  Sensory:  Intact and symmetrical to light touch and pinprick in L2-S1 dermatomes bilaterally.    Lumbar spine:  Lumbar spine:  Range of motion severely reduced with both flexion and extension with increased pain especially  on extension.  Jamir's test deferred  Supine straight leg raise is negative bilaterally.    Internal and external rotation of the hip causes no increased pain on either side.  Myofascial exam:  Moderate tenderness to palpation to the lower lumbar paraspinous muscles.  No tenderness to palpation or percussion to the lumbar spinous processes.  No tenderness to palpation to the thoracic paraspinous muscles or spinous processes.    Imagin2020 CT abdomen and pelvis renal stone protocol:  This is a report only but mentions a moderate compression fracture at L3 with slight retropulsion.  Unclear age.    20 L-spine MRI:  T11-12: Unremarkable   T12-L1: Unremarkable   L1-2: There is only a minimal disc bulge.  There is no spinal canal or significant foraminal stenosis.   L2-3: There is a diffuse disc bulge with osteophytic ridging.  There is no spinal stenosis.  There is mild crowding of the left lateral recess.  There is moderate right and mild left foraminal stenosis.   L3-4: There is a mild diffuse disc bulge.  There is mild facet joint arthropathy.  There is no spinal stenosis.  There is mild right foraminal stenosis.   L4-5: There is a mild diffuse disc bulge and mild facet joint arthropathy.  There is no spinal stenosis.  There is mild right foraminal stenosis.   L5-S1: There is mild facet joint arthropathy.  There is a synovial cyst posterior to the right facet joint.  There is a mild disc bulge with osteophytic ridging contributing to moderate right and mild-to-moderate left foraminal stenosis.  There is no spinal stenosis.   There is edema throughout the L2 and L3 vertebral bodies.  There is approximately 20% loss of vertebral body height of L2 and 40% loss of vertebral body height at the L3 level worse towards the left.      Assessment:  The patient is an 80-year-old man with a history of pancreatic cancer, tobacco use who presents in referral from Dr. Rajeev Oliver for back pain.    1. Lumbar  radiculopathy  Vital signs    Verify informed consent    Notify physician     Notify physician     Notify physician (specify)    Diet NPO    Place sequential compression device    Case Request Operating Room: Injection-steroid-epidural-lumbar, L5/S1    Place in Outpatient    alprazolam ODT dissolvable tablet 0.5 mg   2. DDD (degenerative disc disease), lumbar     3. Compression fracture of T12 vertebra with routine healing, subsequent encounter     4. Screening examination for infectious disease  COVID-19 Routine Screening       Plan:  1.  I reviewed his case with Dr. Kimbrough and we will set him up to repeat and L5/S1 interlaminar epidural steroid injection a couple weeks after he has his endoscopy.  2.  Dr. Kimbrough provided a prescription for hydromorphone 2 mg, 60 tablets.  I have reviewed the [place] Board [employer] website and there are no abberancies.    3.  Follow-up in 4 weeks postprocedure sooner as needed.    Greater than 50% of this 30 minutes visit was spent counseling the patient.

## 2020-10-16 ENCOUNTER — LAB VISIT (OUTPATIENT)
Dept: FAMILY MEDICINE | Facility: CLINIC | Age: 80
End: 2020-10-16
Payer: MEDICARE

## 2020-10-16 DIAGNOSIS — Z11.9 SCREENING EXAMINATION FOR INFECTIOUS DISEASE: ICD-10-CM

## 2020-10-16 PROCEDURE — U0003 INFECTIOUS AGENT DETECTION BY NUCLEIC ACID (DNA OR RNA); SEVERE ACUTE RESPIRATORY SYNDROME CORONAVIRUS 2 (SARS-COV-2) (CORONAVIRUS DISEASE [COVID-19]), AMPLIFIED PROBE TECHNIQUE, MAKING USE OF HIGH THROUGHPUT TECHNOLOGIES AS DESCRIBED BY CMS-2020-01-R: HCPCS

## 2020-10-17 DIAGNOSIS — R97.20 ELEVATED PROSTATE SPECIFIC ANTIGEN (PSA): ICD-10-CM

## 2020-10-17 DIAGNOSIS — S32.000A COMPRESSION FRACTURE OF LUMBAR VERTEBRA, INITIAL ENCOUNTER, UNSPECIFIED LUMBAR VERTEBRAL LEVEL: Primary | ICD-10-CM

## 2020-10-17 LAB — SARS-COV-2 RNA RESP QL NAA+PROBE: NOT DETECTED

## 2020-10-19 ENCOUNTER — HOSPITAL ENCOUNTER (OUTPATIENT)
Facility: HOSPITAL | Age: 80
Discharge: HOME | End: 2020-10-19
Attending: ANESTHESIOLOGY | Admitting: ANESTHESIOLOGY
Payer: MEDICARE

## 2020-10-19 ENCOUNTER — HOSPITAL ENCOUNTER (OUTPATIENT)
Dept: RADIOLOGY | Facility: HOSPITAL | Age: 80
Discharge: HOME | End: 2020-10-19
Attending: ANESTHESIOLOGY
Payer: MEDICARE

## 2020-10-19 DIAGNOSIS — M54.16 LUMBAR RADICULOPATHY: Primary | ICD-10-CM

## 2020-10-19 DIAGNOSIS — S32.000A COMPRESSION FRACTURE OF LUMBAR VERTEBRA, INITIAL ENCOUNTER, UNSPECIFIED LUMBAR VERTEBRAL LEVEL: ICD-10-CM

## 2020-10-19 PROCEDURE — 25000003 PHARM REV CODE 250: Mod: PO | Performed by: ANESTHESIOLOGY

## 2020-10-19 PROCEDURE — 62323 NJX INTERLAMINAR LMBR/SAC: CPT | Mod: ,,, | Performed by: ANESTHESIOLOGY

## 2020-10-19 PROCEDURE — 76000 FLUOROSCOPY <1 HR PHYS/QHP: CPT | Mod: TC,PO

## 2020-10-19 PROCEDURE — 62323 NJX INTERLAMINAR LMBR/SAC: CPT | Mod: PO | Performed by: ANESTHESIOLOGY

## 2020-10-19 PROCEDURE — 63600175 PHARM REV CODE 636 W HCPCS: Mod: PO | Performed by: ANESTHESIOLOGY

## 2020-10-19 PROCEDURE — 62323 PR INJ LUMBAR/SACRAL, W/IMAGING GUIDANCE: ICD-10-PCS | Mod: ,,, | Performed by: ANESTHESIOLOGY

## 2020-10-19 PROCEDURE — 25500020 PHARM REV CODE 255: Mod: PO | Performed by: ANESTHESIOLOGY

## 2020-10-19 RX ORDER — METHYLPREDNISOLONE ACETATE 80 MG/ML
INJECTION, SUSPENSION INTRA-ARTICULAR; INTRALESIONAL; INTRAMUSCULAR; SOFT TISSUE
Status: DISCONTINUED | OUTPATIENT
Start: 2020-10-19 | End: 2020-10-19 | Stop reason: HOSPADM

## 2020-10-19 RX ORDER — ALPRAZOLAM 0.5 MG/1
0.5 TABLET, ORALLY DISINTEGRATING ORAL ONCE AS NEEDED
Status: COMPLETED | OUTPATIENT
Start: 2020-10-19 | End: 2020-10-19

## 2020-10-19 RX ORDER — LIDOCAINE HYDROCHLORIDE 10 MG/ML
INJECTION, SOLUTION EPIDURAL; INFILTRATION; INTRACAUDAL; PERINEURAL
Status: DISCONTINUED | OUTPATIENT
Start: 2020-10-19 | End: 2020-10-19 | Stop reason: HOSPADM

## 2020-10-19 RX ADMIN — ALPRAZOLAM 0.25 MG: 0.5 TABLET, ORALLY DISINTEGRATING ORAL at 02:10

## 2020-10-19 NOTE — OP NOTE

## 2020-10-19 NOTE — DISCHARGE SUMMARY
[facility]  Discharge Note  Short Stay    [facility]  Discharge Note  Short Stay    Admit Date: 10/19/2020    Discharge Date: 10/19/2020    Attending Physician: Galindo Kimbrough MD     Discharge Provider: Galindo Kimbrough    Diagnoses:  Active Hospital Problems    Diagnosis  POA    *Lumbar radiculopathy [M54.16]  Yes      Resolved Hospital Problems   No resolved problems to display.       Discharged Condition: good    Final Diagnoses: Lumbar radiculopathy [M54.16]    Disposition: Home or Self Care    Hospital Course: no complications, uneventful    Outcome of Hospitalization, Treatment, Procedure, or Surgery:  Patient was admitted for outpatient procedure. The patient underwent procedure without complications and are discharged home    Follow up/Patient Instructions:  Follow up as scheduled in Pain Management clinic in 3-4 weeks/Patient has received instructions and follow up date and time    Medications:  Continue previous medications    Discharge Procedure Orders   Call MD for:  temperature >100.4     Call MD for:  severe uncontrolled pain     Call MD for:  redness, tenderness, or signs of infection (pain, swelling, redness, odor or green/yellow discharge around incision site)     Call MD for:  severe persistent headache     No dressing needed         Discharge Procedure Orders (must include Diet, Follow-up, Activity):   Discharge Procedure Orders (must include Diet, Follow-up, Activity)   Call MD for:  temperature >100.4     Call MD for:  severe uncontrolled pain     Call MD for:  redness, tenderness, or signs of infection (pain, swelling, redness, odor or green/yellow discharge around incision site)     Call MD for:  severe persistent headache     No dressing needed

## 2020-10-20 VITALS
OXYGEN SATURATION: 98 % | TEMPERATURE: 98 F | DIASTOLIC BLOOD PRESSURE: 74 MMHG | RESPIRATION RATE: 18 BRPM | WEIGHT: 125 LBS | BODY MASS INDEX: 17.9 KG/M2 | SYSTOLIC BLOOD PRESSURE: 156 MMHG | HEART RATE: 66 BPM | HEIGHT: 70 IN

## 2020-10-20 RX ORDER — HYDROMORPHONE HYDROCHLORIDE 2 MG/1
2 TABLET ORAL EVERY 6 HOURS PRN
Qty: 60 TABLET | Refills: 0 | Status: SHIPPED | OUTPATIENT
Start: 2020-10-20 | End: 2020-11-09 | Stop reason: SDUPTHER

## 2020-10-21 ENCOUNTER — LAB VISIT (OUTPATIENT)
Dept: LAB | Facility: HOSPITAL | Age: 80
End: 2020-10-21
Attending: FAMILY MEDICINE
Payer: MEDICARE

## 2020-10-21 DIAGNOSIS — R63.4 LOSS OF WEIGHT: Primary | ICD-10-CM

## 2020-10-21 PROCEDURE — 84134 ASSAY OF PREALBUMIN: CPT

## 2020-10-22 LAB — PREALB SERPL-MCNC: 15 MG/DL (ref 20–43)

## 2020-10-26 ENCOUNTER — LAB VISIT (OUTPATIENT)
Dept: FAMILY MEDICINE | Facility: CLINIC | Age: 80
End: 2020-10-26
Payer: MEDICARE

## 2020-10-26 DIAGNOSIS — R97.20 ABNORMAL PSA: ICD-10-CM

## 2020-10-26 DIAGNOSIS — R63.4 WEIGHT LOSS: ICD-10-CM

## 2020-10-26 DIAGNOSIS — Z12.5 ENCOUNTER FOR SCREENING FOR MALIGNANT NEOPLASM OF PROSTATE: ICD-10-CM

## 2020-10-26 PROCEDURE — 84153 ASSAY OF PSA TOTAL: CPT

## 2020-10-26 PROCEDURE — 84134 ASSAY OF PREALBUMIN: CPT

## 2020-10-27 LAB
COMPLEXED PSA SERPL-MCNC: 11.3 NG/ML (ref 0–4)
PREALB SERPL-MCNC: 15 MG/DL (ref 20–43)

## 2020-11-04 ENCOUNTER — OFFICE VISIT (OUTPATIENT)
Dept: PAIN MEDICINE | Facility: CLINIC | Age: 80
End: 2020-11-04
Payer: MEDICARE

## 2020-11-04 VITALS
RESPIRATION RATE: 18 BRPM | TEMPERATURE: 98 F | DIASTOLIC BLOOD PRESSURE: 76 MMHG | SYSTOLIC BLOOD PRESSURE: 140 MMHG | OXYGEN SATURATION: 95 % | HEART RATE: 82 BPM

## 2020-11-04 DIAGNOSIS — M51.36 DDD (DEGENERATIVE DISC DISEASE), LUMBAR: ICD-10-CM

## 2020-11-04 DIAGNOSIS — M47.816 LUMBAR SPONDYLOSIS: Primary | ICD-10-CM

## 2020-11-04 DIAGNOSIS — S22.080D COMPRESSION FRACTURE OF T12 VERTEBRA WITH ROUTINE HEALING, SUBSEQUENT ENCOUNTER: ICD-10-CM

## 2020-11-04 PROCEDURE — 99214 OFFICE O/P EST MOD 30 MIN: CPT | Mod: S$PBB,,, | Performed by: PHYSICIAN ASSISTANT

## 2020-11-04 PROCEDURE — 99214 PR OFFICE/OUTPT VISIT, EST, LEVL IV, 30-39 MIN: ICD-10-PCS | Mod: S$PBB,,, | Performed by: PHYSICIAN ASSISTANT

## 2020-11-04 PROCEDURE — 99999 PR PBB SHADOW E&M-EST. PATIENT-LVL IV: CPT | Mod: PBBFAC,,, | Performed by: PHYSICIAN ASSISTANT

## 2020-11-04 PROCEDURE — 99999 PR PBB SHADOW E&M-EST. PATIENT-LVL IV: ICD-10-PCS | Mod: PBBFAC,,, | Performed by: PHYSICIAN ASSISTANT

## 2020-11-04 PROCEDURE — 99214 OFFICE O/P EST MOD 30 MIN: CPT | Mod: PBBFAC,PN | Performed by: PHYSICIAN ASSISTANT

## 2020-11-06 ENCOUNTER — LAB VISIT (OUTPATIENT)
Dept: LAB | Facility: HOSPITAL | Age: 80
End: 2020-11-06
Attending: FAMILY MEDICINE
Payer: MEDICARE

## 2020-11-06 DIAGNOSIS — E11.8 DIABETIC COMPLICATION: Primary | ICD-10-CM

## 2020-11-06 LAB
ESTIMATED AVG GLUCOSE: 137 MG/DL (ref 68–131)
HBA1C MFR BLD HPLC: 6.4 % (ref 4–5.6)

## 2020-11-06 PROCEDURE — 83036 HEMOGLOBIN GLYCOSYLATED A1C: CPT

## 2020-11-09 RX ORDER — HYDROMORPHONE HYDROCHLORIDE 2 MG/1
2 TABLET ORAL EVERY 6 HOURS PRN
Qty: 60 TABLET | Refills: 0 | Status: SHIPPED | OUTPATIENT
Start: 2020-11-09 | End: 2020-12-10 | Stop reason: SDUPTHER

## 2020-11-09 NOTE — H&P (VIEW-ONLY)
This note was completed with dictation software and grammatical errors may exist.    CC:  Back pain    HPI:  The patient is an 80-year-old man with a history of pancreatic cancer, tobacco use who presents in referral from Dr. Rajeev Oliver for back pain.  He is status post L5/S1 interlaminar epidural steroid injection on 10/19/2020 with only slight relief.  He continues to report a pain throughout the entire lumbar region bilaterally.  This is constant but much worse with moving or prolonged sitting.  He does have some relief with lying down and with hydromorphone.  He denies any radiation to his legs.  He denies numbness but reports generalized weakness.  He denies bladder or bowel incontinence.    Previous history:  The patient reports that 2 months ago he was lifting a trailer and felt a sudden back pain.  Since then he has had continued severe back pain.  Is located in the low back and into the sacrum.  He states that it radiates out to the side slightly but denies any radiation into the legs.  He describes it as burning, throbbing, grabbing, tight, tingling, numb, sharp, electric shooting and hot and cold.  It is worse with prolonged sitting or standing, doing any walking, bending, flexing, lifting and with getting out of a bed or a chair, causes difficulty with trying to stand up, he feels weak in the legs.  He has been mostly sitting in a wheelchair, not walking much on his own, having difficulty timing issues and getting dressed.  He gets some relief with lying down and with medications.  When he 1st was injured, this was diagnosis of possible muscle strain, he continued to have severe pain however.  He ended up developing what sounds like a urinary tract infection and had been admitted to a hospital where they found renal calculi but on a CT scan they also noted an L3 compression fracture of unclear age but he did not have this several months prior according to other imaging of that area.    Pain  intervention history: He is status post L2 and L3 kyphoplasty on 08/03/2020.  He is status post T12 and L1 kyphoplasty on 8/12/20 with no relief.  He is status post L5/S1 interlaminar epidural steroid injection on 08/25/2020 with moderate relief.   He is status post L5/S1 interlaminar epidural steroid injection on 10/19/2020 with only slight relief.     Antineuropathics:  NSAIDs:  Physical therapy:  Antidepressants:  Muscle relaxers:  Opioids: He has been taking hydrocodone 7.5/325 every 6 hours and states that it does not help much.  He was actually started on tramadol, went to 5 milligram hydrocodone and eventually on 7.5 and he states that it does nothing.  Currently taking hydromorphone 2mg  Antiplatelets/Anticoagulants:    ROS:  He reports weight loss, vision change, eye pain, cough, appetite change, diarrhea, easy bruising, urinary urgency, renal calculi, loss of balance.  Balance of review systems is negative.    Lab Results   Component Value Date    LABA1C 7.1 01/06/2020    HGBA1C 6.4 (H) 11/06/2020       Lab Results   Component Value Date    WBC 9.31 09/16/2020    HGB 13.3 (L) 09/16/2020    HCT 41.7 09/16/2020    MCV 91 09/16/2020     09/16/2020         Past Medical History:   Diagnosis Date    Abnormal PSA     Basal cell carcinoma of left ear 12/2/2015    Closed fracture of left tibial plateau with routine healing s/p ORIF on 7/15/2016 7/5/2016    Coronary artery disease (CAD) excluded 10/22/2018    DVT (deep venous thrombosis)     Essential hypertension, benign 12/2/2015    Hyperlipidemia     Pancreatic cancer 10/2018    Thyroid disease     Type 2 diabetes mellitus with complication 12/2/2015       Past Surgical History:   Procedure Laterality Date    BASAL CELL CARCINOMA EXCISION  03/17/2017    BLADDER SURGERY      COLONOSCOPY N/A 9/7/2018    Procedure: COLONOSCOPY;  Surgeon: Olu Ahn MD;  Location: [facility];  Service: Endoscopy;  Laterality: N/A;    CORONARY ANGIOGRAPHY  N/A 10/22/2018    Procedure: ANGIOGRAM, CORONARY ARTERY;  Surgeon: Kevin Huizar MD;  Location: [facility] CATH;  Service: Cardiovascular;  Laterality: N/A;    ENDOSCOPIC ULTRASOUND OF UPPER GASTROINTESTINAL TRACT Left 9/11/2018    Procedure: ULTRASOUND, ENDOSCOPIC, UPPER GI TRACT;  Surgeon: Joss May MD;  Location: [facility];  Service: Endoscopy;  Laterality: Left;  Linear scope    EPIDURAL STEROID INJECTION INTO LUMBAR SPINE N/A 8/25/2020    Procedure: Injection-steroid-epidural-lumbar, L5/S1;  Surgeon: Galindo Kimbrough MD;  Location: [facility];  Service: Pain Management;  Laterality: N/A;    EPIDURAL STEROID INJECTION INTO LUMBAR SPINE N/A 10/19/2020    Procedure: Injection-steroid-epidural-lumbar, L5/S1;  Surgeon: Galindo Kimbrough MD;  Location: [facility] OR;  Service: Pain Management;  Laterality: N/A;    ESOPHAGOGASTRODUODENOSCOPY N/A 9/11/2018    Procedure: EGD (ESOPHAGOGASTRODUODENOSCOPY);  Surgeon: Joss May MD;  Location: [facility];  Service: Endoscopy;  Laterality: N/A;    ESOPHAGOGASTRODUODENOSCOPY N/A 10/2/2020    Procedure: EGD (ESOPHAGOGASTRODUODENOSCOPY);  Surgeon: Olu Ahn MD;  Location: [facility];  Service: Endoscopy;  Laterality: N/A;    EXTERNAL FIXATION TIBIAL FRACTURE  07/05/2016    FIXATION KYPHOPLASTY N/A 8/3/2020    Procedure: Kyphoplasty L2 and L3;  Surgeon: Galindo Kimbrough MD;  Location: [facility] OR;  Service: Pain Management;  Laterality: N/A;    FIXATION KYPHOPLASTY N/A 8/12/2020    Procedure: Kyphoplasty T12 and L1;  Surgeon: Galindo Kimbrough MD;  Location: [facility] OR;  Service: Pain Management;  Laterality: N/A;    HERNIA REPAIR      LEFT HEART CATHETERIZATION Left 10/22/2018    Procedure: Left heart cath;  Surgeon: Kevin Huizar MD;  Location: [facility] CATH;  Service: Cardiovascular;  Laterality: Left;    ORIF TIBIA FRACTURE  07/15/2016    PANCREAS SURGERY  10/31/2018    PARTIAL HIP ARTHROPLASTY      PROSTATE BIOPSY         Social History      Socioeconomic History    Marital status:      Spouse name: Not on file    Number of children: Not on file    Years of education: Not on file    Highest education level: Not on file   Occupational History    Not on file   Social Needs    Financial resource strain: Not on file    Food insecurity     Worry: Not on file     Inability: Not on file    Transportation needs     Medical: Not on file     Non-medical: Not on file   Tobacco Use    Smoking status: Current Every Day Smoker     Packs/day: 2.00     Years: 60.00     Pack years: 120.00    Smokeless tobacco: Never Used   Substance and Sexual Activity    Alcohol use: No     Alcohol/week: 0.0 standard drinks    Drug use: No    Sexual activity: Not on file   Lifestyle    Physical activity     Days per week: Not on file     Minutes per session: Not on file    Stress: Not on file   Relationships    Social connections     Talks on phone: Not on file     Gets together: Not on file     Attends Jain service: Not on file     Active member of club or organization: Not on file     Attends meetings of clubs or organizations: Not on file     Relationship status: Not on file   Other Topics Concern    Not on file   Social History Narrative    Not on file         Medications/Allergies: See med card    Vitals:    11/04/20 1039   BP: (!) 140/76   Pulse: 82   Resp: 18   Temp: 98.4 °F (36.9 °C)   TempSrc: Temporal   SpO2: 95%   PainSc: 10-Worst pain ever   PainLoc: Back         Physical exam:  Gen: A and O x3, pleasant, well-groomed  Skin: No rashes or obvious lesions  HEENT: PERRLA, no obvious deformities on ears or in canals. Trachea midline.  CVS: Regular rate and rhythm, normal palpable pulses.  Resp:No increased work of breathing, symmetrical chest rise.  Abdomen: Soft, NT/ND.  Musculoskeletal:  Presents in a wheelchair, able to stand but difficulty with balance.    Neuro:  Lower extremities: 5/5 strength bilaterally, except for hip flexion 4/5  bilaterally  Reflexes: Patellar 1+, Achilles 1+ bilaterally.  Sensory:  Intact and symmetrical to light touch and pinprick in L2-S1 dermatomes bilaterally.    Lumbar spine:  Lumbar spine:  Range of motion severely reduced with both flexion and extension with increased pain especially on extension.  Jamir's test deferred  Supine straight leg raise is negative bilaterally.    Internal and external rotation of the hip causes no increased pain on either side.  Myofascial exam:  Moderate tenderness to palpation to the lower lumbar paraspinous muscles.  No tenderness to palpation or percussion to the lumbar spinous processes.  No tenderness to palpation to the thoracic paraspinous muscles or spinous processes.    Imagin2020 CT abdomen and pelvis renal stone protocol:  This is a report only but mentions a moderate compression fracture at L3 with slight retropulsion.  Unclear age.    20 L-spine MRI:  T11-12: Unremarkable   T12-L1: Unremarkable   L1-2: There is only a minimal disc bulge.  There is no spinal canal or significant foraminal stenosis.   L2-3: There is a diffuse disc bulge with osteophytic ridging.  There is no spinal stenosis.  There is mild crowding of the left lateral recess.  There is moderate right and mild left foraminal stenosis.   L3-4: There is a mild diffuse disc bulge.  There is mild facet joint arthropathy.  There is no spinal stenosis.  There is mild right foraminal stenosis.   L4-5: There is a mild diffuse disc bulge and mild facet joint arthropathy.  There is no spinal stenosis.  There is mild right foraminal stenosis.   L5-S1: There is mild facet joint arthropathy.  There is a synovial cyst posterior to the right facet joint.  There is a mild disc bulge with osteophytic ridging contributing to moderate right and mild-to-moderate left foraminal stenosis.  There is no spinal stenosis.   There is edema throughout the L2 and L3 vertebral bodies.  There is approximately 20% loss of  vertebral body height of L2 and 40% loss of vertebral body height at the L3 level worse towards the left.      Assessment:  The patient is an 80-year-old man with a history of pancreatic cancer, tobacco use who presents in referral from Dr. Rajeev Oliver for back pain.    1. Lumbar spondylosis     2. DDD (degenerative disc disease), lumbar     3. Compression fracture of T12 vertebra with routine healing, subsequent encounter         Plan:  1.  The patient is not having sufficient relief with epidural steroid injections we discussed that he may have some facet mediated pain due to the changes following his compression fractures.  His pain is located throughout the entire lumbar region so I suggested bilateral L1, 2, 3, 4, 5 diagnostic medial branch nerve blocks corresponding to the bilateral L2/3, L3/4, L4/5 and L5/S1 facet joints.  If successful we will proceed with radiofrequency ablation.  He would like to think about this and may call to schedule the procedure.  2.  He continues to take hydromorphone and states that he will call for his next prescription.  3.  Follow-up in 4 weeks postprocedure sooner as needed.    Greater than 50% of this 25 minutes visit was spent counseling the patient.

## 2020-11-09 NOTE — TELEPHONE ENCOUNTER
----- Message from Jacque Blanco sent at 11/9/2020  9:15 AM CST -----  Type: Needs to schedule procedure    Who Called:  Wife (Kika)  Best Call Back Number: [phone]  Additional Information:  Requesting to speak with nurse concerning scheduling procedure/please call back to schedule or advise.

## 2020-11-09 NOTE — PROGRESS NOTES
This note was completed with dictation software and grammatical errors may exist.    CC:  Back pain    HPI:  The patient is an 80-year-old man with a history of pancreatic cancer, tobacco use who presents in referral from Dr. Rajeev Oliver for back pain.  He is status post L5/S1 interlaminar epidural steroid injection on 10/19/2020 with only slight relief.  He continues to report a pain throughout the entire lumbar region bilaterally.  This is constant but much worse with moving or prolonged sitting.  He does have some relief with lying down and with hydromorphone.  He denies any radiation to his legs.  He denies numbness but reports generalized weakness.  He denies bladder or bowel incontinence.    Previous history:  The patient reports that 2 months ago he was lifting a trailer and felt a sudden back pain.  Since then he has had continued severe back pain.  Is located in the low back and into the sacrum.  He states that it radiates out to the side slightly but denies any radiation into the legs.  He describes it as burning, throbbing, grabbing, tight, tingling, numb, sharp, electric shooting and hot and cold.  It is worse with prolonged sitting or standing, doing any walking, bending, flexing, lifting and with getting out of a bed or a chair, causes difficulty with trying to stand up, he feels weak in the legs.  He has been mostly sitting in a wheelchair, not walking much on his own, having difficulty timing issues and getting dressed.  He gets some relief with lying down and with medications.  When he 1st was injured, this was diagnosis of possible muscle strain, he continued to have severe pain however.  He ended up developing what sounds like a urinary tract infection and had been admitted to a hospital where they found renal calculi but on a CT scan they also noted an L3 compression fracture of unclear age but he did not have this several months prior according to other imaging of that area.    Pain  intervention history: He is status post L2 and L3 kyphoplasty on 08/03/2020.  He is status post T12 and L1 kyphoplasty on 8/12/20 with no relief.  He is status post L5/S1 interlaminar epidural steroid injection on 08/25/2020 with moderate relief.   He is status post L5/S1 interlaminar epidural steroid injection on 10/19/2020 with only slight relief.     Antineuropathics:  NSAIDs:  Physical therapy:  Antidepressants:  Muscle relaxers:  Opioids: He has been taking hydrocodone 7.5/325 every 6 hours and states that it does not help much.  He was actually started on tramadol, went to 5 milligram hydrocodone and eventually on 7.5 and he states that it does nothing.  Currently taking hydromorphone 2mg  Antiplatelets/Anticoagulants:    ROS:  He reports weight loss, vision change, eye pain, cough, appetite change, diarrhea, easy bruising, urinary urgency, renal calculi, loss of balance.  Balance of review systems is negative.    Lab Results   Component Value Date    LABA1C 7.1 01/06/2020    HGBA1C 6.4 (H) 11/06/2020       Lab Results   Component Value Date    WBC 9.31 09/16/2020    HGB 13.3 (L) 09/16/2020    HCT 41.7 09/16/2020    MCV 91 09/16/2020     09/16/2020         Past Medical History:   Diagnosis Date    Abnormal PSA     Basal cell carcinoma of left ear 12/2/2015    Closed fracture of left tibial plateau with routine healing s/p ORIF on 7/15/2016 7/5/2016    Coronary artery disease (CAD) excluded 10/22/2018    DVT (deep venous thrombosis)     Essential hypertension, benign 12/2/2015    Hyperlipidemia     Pancreatic cancer 10/2018    Thyroid disease     Type 2 diabetes mellitus with complication 12/2/2015       Past Surgical History:   Procedure Laterality Date    BASAL CELL CARCINOMA EXCISION  03/17/2017    BLADDER SURGERY      COLONOSCOPY N/A 9/7/2018    Procedure: COLONOSCOPY;  Surgeon: Olu Ahn MD;  Location: [facility];  Service: Endoscopy;  Laterality: N/A;    CORONARY ANGIOGRAPHY  N/A 10/22/2018    Procedure: ANGIOGRAM, CORONARY ARTERY;  Surgeon: Kevin Huizar MD;  Location: [facility] CATH;  Service: Cardiovascular;  Laterality: N/A;    ENDOSCOPIC ULTRASOUND OF UPPER GASTROINTESTINAL TRACT Left 9/11/2018    Procedure: ULTRASOUND, ENDOSCOPIC, UPPER GI TRACT;  Surgeon: Joss May MD;  Location: [facility];  Service: Endoscopy;  Laterality: Left;  Linear scope    EPIDURAL STEROID INJECTION INTO LUMBAR SPINE N/A 8/25/2020    Procedure: Injection-steroid-epidural-lumbar, L5/S1;  Surgeon: Galindo Kimbrough MD;  Location: [facility];  Service: Pain Management;  Laterality: N/A;    EPIDURAL STEROID INJECTION INTO LUMBAR SPINE N/A 10/19/2020    Procedure: Injection-steroid-epidural-lumbar, L5/S1;  Surgeon: Galindo Kimbrough MD;  Location: [facility] OR;  Service: Pain Management;  Laterality: N/A;    ESOPHAGOGASTRODUODENOSCOPY N/A 9/11/2018    Procedure: EGD (ESOPHAGOGASTRODUODENOSCOPY);  Surgeon: Joss May MD;  Location: [facility];  Service: Endoscopy;  Laterality: N/A;    ESOPHAGOGASTRODUODENOSCOPY N/A 10/2/2020    Procedure: EGD (ESOPHAGOGASTRODUODENOSCOPY);  Surgeon: Olu Ahn MD;  Location: [facility];  Service: Endoscopy;  Laterality: N/A;    EXTERNAL FIXATION TIBIAL FRACTURE  07/05/2016    FIXATION KYPHOPLASTY N/A 8/3/2020    Procedure: Kyphoplasty L2 and L3;  Surgeon: Galindo Kimbrough MD;  Location: [facility] OR;  Service: Pain Management;  Laterality: N/A;    FIXATION KYPHOPLASTY N/A 8/12/2020    Procedure: Kyphoplasty T12 and L1;  Surgeon: Galindo Kimbrough MD;  Location: [facility] OR;  Service: Pain Management;  Laterality: N/A;    HERNIA REPAIR      LEFT HEART CATHETERIZATION Left 10/22/2018    Procedure: Left heart cath;  Surgeon: Kevin Huizar MD;  Location: [facility] CATH;  Service: Cardiovascular;  Laterality: Left;    ORIF TIBIA FRACTURE  07/15/2016    PANCREAS SURGERY  10/31/2018    PARTIAL HIP ARTHROPLASTY      PROSTATE BIOPSY         Social History      Socioeconomic History    Marital status:      Spouse name: Not on file    Number of children: Not on file    Years of education: Not on file    Highest education level: Not on file   Occupational History    Not on file   Social Needs    Financial resource strain: Not on file    Food insecurity     Worry: Not on file     Inability: Not on file    Transportation needs     Medical: Not on file     Non-medical: Not on file   Tobacco Use    Smoking status: Current Every Day Smoker     Packs/day: 2.00     Years: 60.00     Pack years: 120.00    Smokeless tobacco: Never Used   Substance and Sexual Activity    Alcohol use: No     Alcohol/week: 0.0 standard drinks    Drug use: No    Sexual activity: Not on file   Lifestyle    Physical activity     Days per week: Not on file     Minutes per session: Not on file    Stress: Not on file   Relationships    Social connections     Talks on phone: Not on file     Gets together: Not on file     Attends [employer] service: Not on file     Active member of club or organization: Not on file     Attends meetings of clubs or organizations: Not on file     Relationship status: Not on file   Other Topics Concern    Not on file   Social History Narrative    Not on file         Medications/Allergies: See med card    Vitals:    11/04/20 1039   BP: (!) 140/76   Pulse: 82   Resp: 18   Temp: 98.4 °F (36.9 °C)   TempSrc: Temporal   SpO2: 95%   PainSc: 10-Worst pain ever   PainLoc: Back         Physical exam:  Gen: A and O x3, pleasant, well-groomed  Skin: No rashes or obvious lesions  HEENT: PERRLA, no obvious deformities on ears or in canals. Trachea midline.  CVS: Regular rate and rhythm, normal palpable pulses.  Resp:No increased work of breathing, symmetrical chest rise.  Abdomen: Soft, NT/ND.  Musculoskeletal:  Presents in a wheelchair, able to stand but difficulty with balance.    Neuro:  Lower extremities: 5/5 strength bilaterally, except for hip flexion 4/5  bilaterally  Reflexes: Patellar 1+, Achilles 1+ bilaterally.  Sensory:  Intact and symmetrical to light touch and pinprick in L2-S1 dermatomes bilaterally.    Lumbar spine:  Lumbar spine:  Range of motion severely reduced with both flexion and extension with increased pain especially on extension.  Jamir's test deferred  Supine straight leg raise is negative bilaterally.    Internal and external rotation of the hip causes no increased pain on either side.  Myofascial exam:  Moderate tenderness to palpation to the lower lumbar paraspinous muscles.  No tenderness to palpation or percussion to the lumbar spinous processes.  No tenderness to palpation to the thoracic paraspinous muscles or spinous processes.    Imagin2020 CT abdomen and pelvis renal stone protocol:  This is a report only but mentions a moderate compression fracture at L3 with slight retropulsion.  Unclear age.    20 L-spine MRI:  T11-12: Unremarkable   T12-L1: Unremarkable   L1-2: There is only a minimal disc bulge.  There is no spinal canal or significant foraminal stenosis.   L2-3: There is a diffuse disc bulge with osteophytic ridging.  There is no spinal stenosis.  There is mild crowding of the left lateral recess.  There is moderate right and mild left foraminal stenosis.   L3-4: There is a mild diffuse disc bulge.  There is mild facet joint arthropathy.  There is no spinal stenosis.  There is mild right foraminal stenosis.   L4-5: There is a mild diffuse disc bulge and mild facet joint arthropathy.  There is no spinal stenosis.  There is mild right foraminal stenosis.   L5-S1: There is mild facet joint arthropathy.  There is a synovial cyst posterior to the right facet joint.  There is a mild disc bulge with osteophytic ridging contributing to moderate right and mild-to-moderate left foraminal stenosis.  There is no spinal stenosis.   There is edema throughout the L2 and L3 vertebral bodies.  There is approximately 20% loss of  vertebral body height of L2 and 40% loss of vertebral body height at the L3 level worse towards the left.      Assessment:  The patient is an 80-year-old man with a history of pancreatic cancer, tobacco use who presents in referral from Dr. Rajeev Oliver for back pain.    1. Lumbar spondylosis     2. DDD (degenerative disc disease), lumbar     3. Compression fracture of T12 vertebra with routine healing, subsequent encounter         Plan:  1.  The patient is not having sufficient relief with epidural steroid injections we discussed that he may have some facet mediated pain due to the changes following his compression fractures.  His pain is located throughout the entire lumbar region so I suggested bilateral L1, 2, 3, 4, 5 diagnostic medial branch nerve blocks corresponding to the bilateral L2/3, L3/4, L4/5 and L5/S1 facet joints.  If successful we will proceed with radiofrequency ablation.  He would like to think about this and may call to schedule the procedure.  2.  He continues to take hydromorphone and states that he will call for his next prescription.  3.  Follow-up in 4 weeks postprocedure sooner as needed.    Greater than 50% of this 25 minutes visit was spent counseling the patient.

## 2020-11-09 NOTE — TELEPHONE ENCOUNTER
Spoke with patient's wife. She sated patient discussed having another procedure with provider at the last visit. Patient decided he would like to proceed with procedure.

## 2020-11-11 ENCOUNTER — TELEPHONE (OUTPATIENT)
Dept: PAIN MEDICINE | Facility: CLINIC | Age: 80
End: 2020-11-11

## 2020-11-11 DIAGNOSIS — Z11.9 SCREENING EXAMINATION FOR INFECTIOUS DISEASE: ICD-10-CM

## 2020-11-11 DIAGNOSIS — M47.816 LUMBAR SPONDYLOSIS: Primary | ICD-10-CM

## 2020-11-11 RX ORDER — SODIUM CHLORIDE, SODIUM LACTATE, POTASSIUM CHLORIDE, CALCIUM CHLORIDE 600; 310; 30; 20 MG/100ML; MG/100ML; MG/100ML; MG/100ML
INJECTION, SOLUTION INTRAVENOUS CONTINUOUS
Status: CANCELLED | OUTPATIENT
Start: 2020-11-11

## 2020-11-11 NOTE — TELEPHONE ENCOUNTER
Spoke with patient's wife. Bilateral MBB L1, 2, 3, 4, 5 scheduled for 11/18 with Dr. Kimbrough. COVID test scheduled for 11/15 at 11:55am. Wife also aware of rx refill.

## 2020-11-11 NOTE — TELEPHONE ENCOUNTER
Spoke with patient's wife. Procedure rescheduled for 11/30 with Dr. Kimbrough and COVID test rescheduled for 11/27 at 11:20am. Patient's wife verbalized understanding.

## 2020-11-27 ENCOUNTER — LAB VISIT (OUTPATIENT)
Dept: FAMILY MEDICINE | Facility: CLINIC | Age: 80
End: 2020-11-27
Payer: MEDICARE

## 2020-11-27 DIAGNOSIS — Z11.9 SCREENING EXAMINATION FOR INFECTIOUS DISEASE: ICD-10-CM

## 2020-11-27 PROCEDURE — U0003 INFECTIOUS AGENT DETECTION BY NUCLEIC ACID (DNA OR RNA); SEVERE ACUTE RESPIRATORY SYNDROME CORONAVIRUS 2 (SARS-COV-2) (CORONAVIRUS DISEASE [COVID-19]), AMPLIFIED PROBE TECHNIQUE, MAKING USE OF HIGH THROUGHPUT TECHNOLOGIES AS DESCRIBED BY CMS-2020-01-R: HCPCS

## 2020-11-28 LAB — SARS-COV-2 RNA RESP QL NAA+PROBE: NOT DETECTED

## 2020-11-30 ENCOUNTER — HOSPITAL ENCOUNTER (OUTPATIENT)
Dept: RADIOLOGY | Facility: HOSPITAL | Age: 80
Discharge: HOME | End: 2020-11-30
Attending: ANESTHESIOLOGY
Payer: MEDICARE

## 2020-11-30 ENCOUNTER — HOSPITAL ENCOUNTER (OUTPATIENT)
Facility: HOSPITAL | Age: 80
Discharge: HOME | End: 2020-11-30
Attending: ANESTHESIOLOGY | Admitting: ANESTHESIOLOGY
Payer: MEDICARE

## 2020-11-30 VITALS
SYSTOLIC BLOOD PRESSURE: 140 MMHG | BODY MASS INDEX: 17.9 KG/M2 | HEART RATE: 72 BPM | WEIGHT: 125 LBS | DIASTOLIC BLOOD PRESSURE: 90 MMHG | HEIGHT: 70 IN | OXYGEN SATURATION: 97 % | RESPIRATION RATE: 16 BRPM | TEMPERATURE: 97 F

## 2020-11-30 DIAGNOSIS — S32.000A COMPRESSION FRACTURE OF LUMBAR VERTEBRA, INITIAL ENCOUNTER, UNSPECIFIED LUMBAR VERTEBRAL LEVEL: ICD-10-CM

## 2020-11-30 DIAGNOSIS — M47.816 LUMBAR SPONDYLOSIS: Primary | ICD-10-CM

## 2020-11-30 PROCEDURE — 64493 INJ PARAVERT F JNT L/S 1 LEV: CPT | Mod: 50,,, | Performed by: ANESTHESIOLOGY

## 2020-11-30 PROCEDURE — 99152 MOD SED SAME PHYS/QHP 5/>YRS: CPT | Mod: ,,, | Performed by: ANESTHESIOLOGY

## 2020-11-30 PROCEDURE — 64493 INJ PARAVERT F JNT L/S 1 LEV: CPT | Mod: 50,PO | Performed by: ANESTHESIOLOGY

## 2020-11-30 PROCEDURE — 25000003 PHARM REV CODE 250: Mod: PO | Performed by: ANESTHESIOLOGY

## 2020-11-30 PROCEDURE — 63600175 PHARM REV CODE 636 W HCPCS: Mod: PO | Performed by: ANESTHESIOLOGY

## 2020-11-30 PROCEDURE — 64495 INJ PARAVERT F JNT L/S 3 LEV: CPT | Mod: 50,,, | Performed by: ANESTHESIOLOGY

## 2020-11-30 PROCEDURE — 64493 PR INJ DX/THER AGNT PARAVERT FACET JOINT,IMG GUIDE,LUMBAR/SAC,1ST LVL: ICD-10-PCS | Mod: 50,,, | Performed by: ANESTHESIOLOGY

## 2020-11-30 PROCEDURE — 64495 PR INJ DX/THER AGNT PARAVERT FACET JOINT,IMG GUIDE,LUMBAR/SAC, ADD LEVEL: ICD-10-PCS | Mod: 50,,, | Performed by: ANESTHESIOLOGY

## 2020-11-30 PROCEDURE — 64494 INJ PARAVERT F JNT L/S 2 LEV: CPT | Mod: 50,PO | Performed by: ANESTHESIOLOGY

## 2020-11-30 PROCEDURE — 76000 FLUOROSCOPY <1 HR PHYS/QHP: CPT | Mod: TC,PO

## 2020-11-30 PROCEDURE — 64494 PR INJ DX/THER AGNT PARAVERT FACET JOINT,IMG GUIDE,LUMBAR/SAC, 2ND LEVEL: ICD-10-PCS | Mod: 50,,, | Performed by: ANESTHESIOLOGY

## 2020-11-30 PROCEDURE — 25500020 PHARM REV CODE 255: Mod: PO | Performed by: ANESTHESIOLOGY

## 2020-11-30 PROCEDURE — 64494 INJ PARAVERT F JNT L/S 2 LEV: CPT | Mod: 50,,, | Performed by: ANESTHESIOLOGY

## 2020-11-30 PROCEDURE — 64495 INJ PARAVERT F JNT L/S 3 LEV: CPT | Mod: 50,PO | Performed by: ANESTHESIOLOGY

## 2020-11-30 PROCEDURE — 99152 PR MOD CONSCIOUS SEDATION, SAME PHYS, 5+ YRS, FIRST 15 MIN: ICD-10-PCS | Mod: ,,, | Performed by: ANESTHESIOLOGY

## 2020-11-30 RX ORDER — BUPIVACAINE HYDROCHLORIDE 2.5 MG/ML
INJECTION, SOLUTION EPIDURAL; INFILTRATION; INTRACAUDAL
Status: DISCONTINUED | OUTPATIENT
Start: 2020-11-30 | End: 2020-11-30 | Stop reason: HOSPADM

## 2020-11-30 RX ORDER — MIDAZOLAM HYDROCHLORIDE 2 MG/2ML
INJECTION, SOLUTION INTRAMUSCULAR; INTRAVENOUS
Status: DISCONTINUED | OUTPATIENT
Start: 2020-11-30 | End: 2020-11-30 | Stop reason: HOSPADM

## 2020-11-30 RX ORDER — LIDOCAINE HYDROCHLORIDE 10 MG/ML
INJECTION, SOLUTION EPIDURAL; INFILTRATION; INTRACAUDAL; PERINEURAL
Status: DISCONTINUED | OUTPATIENT
Start: 2020-11-30 | End: 2020-11-30 | Stop reason: HOSPADM

## 2020-11-30 RX ORDER — SODIUM CHLORIDE, SODIUM LACTATE, POTASSIUM CHLORIDE, CALCIUM CHLORIDE 600; 310; 30; 20 MG/100ML; MG/100ML; MG/100ML; MG/100ML
INJECTION, SOLUTION INTRAVENOUS CONTINUOUS
Status: DISCONTINUED | OUTPATIENT
Start: 2020-11-30 | End: 2020-11-30 | Stop reason: HOSPADM

## 2020-11-30 RX ADMIN — SODIUM CHLORIDE, SODIUM LACTATE, POTASSIUM CHLORIDE, AND CALCIUM CHLORIDE: .6; .31; .03; .02 INJECTION, SOLUTION INTRAVENOUS at 12:11

## 2020-11-30 NOTE — DISCHARGE INSTRUCTIONS
PAIN MANAGEMENT    Home care instructions   Apply ice pack to the injection site for 20 minute prior for the first 24 hours for soreness/discomfort at injection site   DO NOT USE HEAT FOR 24 HOURS   Keep site clean and dry for 24 hours, remove bandaid when desired   Do not drive until tomorrow      BLOCKS  Resume regular activities today  Pain office will call in next 2 days      Resume Aspirin, Plavix, or Coumadin the day after the procedure unless other wise instructed  Resume home medication as prescribed today      CALL PHYSICIAN FOR:   Severe increase in your usual pain or appearance of new pain   Prolonged or increasing weakness or numbness in the legs or arms   Fever greater then 100 degrees F..   Drainage from the incision site, redness, active bleeding or increased swelling at the injection site   Headache that increases when your head is upright and decreases when you lie flat    FOR EMERGENCIES:   Go directly to Emergency Department for Shortness of breath, chest pain, or problems breathing

## 2020-11-30 NOTE — PLAN OF CARE
Pt tolerating oral liquids, discharge instructions reviewed with patient, verbalizes understanding. Injection sites to back, open to air, clean dry and intact.

## 2020-11-30 NOTE — OP NOTE
PROCEDURE DATE: 11/30/2020    PROCEDURE:  Bilateral L1,2,3,4,5 medial branch nerve block     DIAGNOSIS:  Lumbar spondylosis    Post Op diagnosis: Same    PHYSICIAN: Galindo Kimbrough MD    MEDICATIONS INJECTED: 0.25% bupivicaine, 1ml at each level    LOCAL ANESTHETIC USED: Lidocaine 1%, 2ml at each level    SEDATION MEDICATIONS:2mg versed    ESTIMATED BLOOD LOSS:  none    COMPLICATIONS:  none    TECHNIQUE: A time out was taken to identify the patient, procedure and side of the procedure. The patient was placed in a prone position, then prepped and draped in the usual sterile fashion using ChloraPrep and sterile towels.  The levels were determined under fluoroscopic guidance and then marked.  Local anesthetic was given by raising a wheal at the skin over each site and then infiltrated approximately 2cm deeper.  A 25-gauge 3.5 inch needle was introduced to the anatomic location of the right and then left L1,2,3,4,5 medial branch nerves on the bilateral side.  Appropriate location and medication spread confirmed by injecting 0.5ml of Omnipaque. The above medication was then injected. The patient tolerated the procedure well.     The patient was monitored after the procedure. The patient will be contacted in the next few days to determine extent of relief.  Patient was given post procedure and discharge instructions to follow at home.  The patient was discharged in a stable condition.    Event Time In   In Facility 1151   In Pre-Procedure 1209   Physician Available    Anesthesia Available    Pre-Op: Bedside Procedure Start    Pre-Op: Bedside Procedure Stop    Pre-Procedure Complete 1244   Out of Pre-Procedure    Anesthesia Start    Anesthesia Start Data Collection    Setup Start    Setup Complete    In Room 1256   Prep Start    Procedure Prep Complete    Procedure Start 1308   Procedure Closing    Emergence    Procedure Finish 1321   Sedation Start 1258   Scope In    Extent Reached    Scope Out    Sedation End 1324   Out  of Room 1324   Cleanup Start    Cleanup Complete    Cosmetic Start    Cosmetic Stop    Pain Mgmt In Room    Pain Mgmt Out Room    In Recovery    Anesthesia Finish    Bedside Procedure Start    Bedside Procedure Stop    Recovery Care Complete    Out of Recovery    To Phase II    In Phase II    Pain Mgmt Recovery Start    Pain Mgmt Recovery Stop    Obs Rec Start    Obs Rec Stop    Phase II Care Complete    Out of Phase II    Procedural Care Complete    Discharge    Pain Follow Up Needed    Pain Follow Up Complete      Moderate sedation was achieved with midazolam 2mg.  Continuous monitoring of EKG, blood pressure and pulse oximetry was provided by a registered nurse during the entire course of the procedure under my supervision and recorded in the patient's medical record.   Total time for sedation was 28 minutes.

## 2020-11-30 NOTE — CARE UPDATE
Patient and wife encouraged to follow up with PCP if diarrhea continues through the night. Wife states she will call PCP tomorrow

## 2020-12-01 ENCOUNTER — TELEPHONE (OUTPATIENT)
Dept: PAIN MEDICINE | Facility: CLINIC | Age: 80
End: 2020-12-01

## 2020-12-01 DIAGNOSIS — Z11.9 SCREENING EXAMINATION FOR INFECTIOUS DISEASE: ICD-10-CM

## 2020-12-01 DIAGNOSIS — M47.816 LUMBAR SPONDYLOSIS: Primary | ICD-10-CM

## 2020-12-01 RX ORDER — SODIUM CHLORIDE, SODIUM LACTATE, POTASSIUM CHLORIDE, CALCIUM CHLORIDE 600; 310; 30; 20 MG/100ML; MG/100ML; MG/100ML; MG/100ML
INJECTION, SOLUTION INTRAVENOUS CONTINUOUS
Status: CANCELLED | OUTPATIENT
Start: 2020-12-01

## 2020-12-01 NOTE — TELEPHONE ENCOUNTER
Spoke with patient and patient's wife regarding bilateral MBB L1, 2, 3, 4, 5 performed on 11/30 by Dr. Kimbrough. Patient states he received 60% relief from the procedure that lasted around 6-8 hours. Activities included getting out of bed easier and walking better. Scheduled patient for COVID test 12/11 at 11:20am, bilateral ablation 12/14, and follow up appointment with YESENIA Cosme on 1/5 at 10:40am. All pre-procedure instructions reviewed. Patient and wife verbalized understanding,

## 2020-12-10 RX ORDER — HYDROMORPHONE HYDROCHLORIDE 2 MG/1
2 TABLET ORAL EVERY 6 HOURS PRN
Qty: 60 TABLET | Refills: 0 | Status: SHIPPED | OUTPATIENT
Start: 2020-12-10 | End: 2021-01-21 | Stop reason: SDUPTHER

## 2020-12-11 ENCOUNTER — LAB VISIT (OUTPATIENT)
Dept: FAMILY MEDICINE | Facility: CLINIC | Age: 80
End: 2020-12-11
Payer: MEDICARE

## 2020-12-11 DIAGNOSIS — Z11.9 SCREENING EXAMINATION FOR INFECTIOUS DISEASE: ICD-10-CM

## 2020-12-11 PROCEDURE — U0003 INFECTIOUS AGENT DETECTION BY NUCLEIC ACID (DNA OR RNA); SEVERE ACUTE RESPIRATORY SYNDROME CORONAVIRUS 2 (SARS-COV-2) (CORONAVIRUS DISEASE [COVID-19]), AMPLIFIED PROBE TECHNIQUE, MAKING USE OF HIGH THROUGHPUT TECHNOLOGIES AS DESCRIBED BY CMS-2020-01-R: HCPCS

## 2020-12-12 LAB — SARS-COV-2 RNA RESP QL NAA+PROBE: NOT DETECTED

## 2020-12-14 ENCOUNTER — HOSPITAL ENCOUNTER (OUTPATIENT)
Dept: RADIOLOGY | Facility: HOSPITAL | Age: 80
Discharge: HOME | End: 2020-12-14
Attending: ANESTHESIOLOGY
Payer: MEDICARE

## 2020-12-14 ENCOUNTER — HOSPITAL ENCOUNTER (OUTPATIENT)
Facility: HOSPITAL | Age: 80
Discharge: HOME | End: 2020-12-14
Attending: ANESTHESIOLOGY | Admitting: ANESTHESIOLOGY
Payer: MEDICARE

## 2020-12-14 VITALS
RESPIRATION RATE: 16 BRPM | HEIGHT: 70 IN | OXYGEN SATURATION: 98 % | SYSTOLIC BLOOD PRESSURE: 170 MMHG | TEMPERATURE: 98 F | DIASTOLIC BLOOD PRESSURE: 88 MMHG | HEART RATE: 75 BPM | WEIGHT: 125 LBS | BODY MASS INDEX: 17.9 KG/M2

## 2020-12-14 DIAGNOSIS — M47.816 LUMBAR SPONDYLOSIS: ICD-10-CM

## 2020-12-14 DIAGNOSIS — S32.000A COMPRESSION FRACTURE OF LUMBAR VERTEBRA, INITIAL ENCOUNTER, UNSPECIFIED LUMBAR VERTEBRAL LEVEL: ICD-10-CM

## 2020-12-14 LAB — GLUCOSE SERPL-MCNC: 133 MG/DL (ref 70–110)

## 2020-12-14 PROCEDURE — 99152 MOD SED SAME PHYS/QHP 5/>YRS: CPT | Mod: ,,, | Performed by: ANESTHESIOLOGY

## 2020-12-14 PROCEDURE — 64635 PR DESTROY LUMB/SAC FACET JNT: ICD-10-PCS | Mod: 50,,, | Performed by: ANESTHESIOLOGY

## 2020-12-14 PROCEDURE — 76000 FLUOROSCOPY <1 HR PHYS/QHP: CPT | Mod: TC,PO

## 2020-12-14 PROCEDURE — 64636 DESTROY L/S FACET JNT ADDL: CPT | Mod: 50,,, | Performed by: ANESTHESIOLOGY

## 2020-12-14 PROCEDURE — 82962 GLUCOSE BLOOD TEST: CPT | Mod: PO | Performed by: ANESTHESIOLOGY

## 2020-12-14 PROCEDURE — A4216 STERILE WATER/SALINE, 10 ML: HCPCS | Mod: PO | Performed by: ANESTHESIOLOGY

## 2020-12-14 PROCEDURE — 64636 DESTROY L/S FACET JNT ADDL: CPT | Mod: 50,PO | Performed by: ANESTHESIOLOGY

## 2020-12-14 PROCEDURE — 64635 DESTROY LUMB/SAC FACET JNT: CPT | Mod: 50,,, | Performed by: ANESTHESIOLOGY

## 2020-12-14 PROCEDURE — 63600175 PHARM REV CODE 636 W HCPCS: Mod: PO | Performed by: ANESTHESIOLOGY

## 2020-12-14 PROCEDURE — 99152 PR MOD CONSCIOUS SEDATION, SAME PHYS, 5+ YRS, FIRST 15 MIN: ICD-10-PCS | Mod: ,,, | Performed by: ANESTHESIOLOGY

## 2020-12-14 PROCEDURE — 25000003 PHARM REV CODE 250: Mod: PO | Performed by: ANESTHESIOLOGY

## 2020-12-14 PROCEDURE — 64636 PR DESTROY L/S FACET JNT ADDL: ICD-10-PCS | Mod: 50,,, | Performed by: ANESTHESIOLOGY

## 2020-12-14 PROCEDURE — 64635 DESTROY LUMB/SAC FACET JNT: CPT | Mod: 50,PO | Performed by: ANESTHESIOLOGY

## 2020-12-14 RX ORDER — LIDOCAINE HYDROCHLORIDE 20 MG/ML
INJECTION, SOLUTION EPIDURAL; INFILTRATION; INTRACAUDAL; PERINEURAL
Status: DISCONTINUED | OUTPATIENT
Start: 2020-12-14 | End: 2020-12-14 | Stop reason: HOSPADM

## 2020-12-14 RX ORDER — SODIUM CHLORIDE 9 MG/ML
INJECTION, SOLUTION INTRAMUSCULAR; INTRAVENOUS; SUBCUTANEOUS
Status: DISCONTINUED | OUTPATIENT
Start: 2020-12-14 | End: 2020-12-14 | Stop reason: HOSPADM

## 2020-12-14 RX ORDER — FENTANYL CITRATE 50 UG/ML
INJECTION, SOLUTION INTRAMUSCULAR; INTRAVENOUS
Status: DISCONTINUED | OUTPATIENT
Start: 2020-12-14 | End: 2020-12-14 | Stop reason: HOSPADM

## 2020-12-14 RX ORDER — METHYLPREDNISOLONE ACETATE 40 MG/ML
INJECTION, SUSPENSION INTRA-ARTICULAR; INTRALESIONAL; INTRAMUSCULAR; SOFT TISSUE
Status: DISCONTINUED | OUTPATIENT
Start: 2020-12-14 | End: 2020-12-14 | Stop reason: HOSPADM

## 2020-12-14 RX ORDER — MIDAZOLAM HYDROCHLORIDE 1 MG/ML
INJECTION INTRAMUSCULAR; INTRAVENOUS
Status: DISCONTINUED | OUTPATIENT
Start: 2020-12-14 | End: 2020-12-14 | Stop reason: HOSPADM

## 2020-12-14 RX ORDER — LIDOCAINE HYDROCHLORIDE 10 MG/ML
INJECTION, SOLUTION EPIDURAL; INFILTRATION; INTRACAUDAL; PERINEURAL
Status: DISCONTINUED | OUTPATIENT
Start: 2020-12-14 | End: 2020-12-14 | Stop reason: HOSPADM

## 2020-12-14 RX ORDER — SODIUM CHLORIDE, SODIUM LACTATE, POTASSIUM CHLORIDE, CALCIUM CHLORIDE 600; 310; 30; 20 MG/100ML; MG/100ML; MG/100ML; MG/100ML
INJECTION, SOLUTION INTRAVENOUS CONTINUOUS
Status: DISCONTINUED | OUTPATIENT
Start: 2020-12-14 | End: 2020-12-14 | Stop reason: HOSPADM

## 2020-12-14 RX ADMIN — SODIUM CHLORIDE, SODIUM LACTATE, POTASSIUM CHLORIDE, AND CALCIUM CHLORIDE: .6; .31; .03; .02 INJECTION, SOLUTION INTRAVENOUS at 02:12

## 2020-12-14 NOTE — OP NOTE
PROCEDURE DATE: 12/14/2020    PROCEDURE:  Radiofrequency ablation of the bilateral L1,2,3,4,5 medial branch nerves on the bilateral-side utilizing fluoroscopy    DIAGNOSIS:  Lumbar spondylosis    Post op Diagnosis: Same    PHYSICIAN: Galindo Kimbrough MD    MEDICATIONS INJECTED:  From a mixture of 4ml of 2% lidocaine and 40mg of methylprednisone, 1ml of this solution was injected at each level.    LOCAL ANESTHETIC USED: Lidocaine 1%, 3 ml given at each site.    SEDATION MEDICATIONS: 1mg versed, 25mcg fentanyl    ESTIMATED BLOOD LOSS:  none    COMPLICATIONS:  none    TECHNIQUE:  A time out was taken to identify patient and procedure side prior to starting the procedure. Laying in a prone position, the patient was prepped and draped in the usual sterile fashion using ChloraPrep and sterile towels.  The levels were determined under fluoroscopic guidance and then marked.  Local anesthetic was given by raising a wheal at the skin over each site and then infiltrated approximately 2cm deeper.  A 20-gauge  100 mm Searchdaimon RF needle was introduced to the anatomic location of the right and then left L1,2,3,4,5 medial branch nerves.  Motor stimulation up to 2 Volts at each level confirmed no motor nerve involvement.  Impedance was less than 800 ohms at each level. The above noted medication was then injected slowly.  Ablation was performed per level utilizing Joss radiofrequency generator 80°C for 90 seconds. The patient tolerated the procedure well.     The patient was monitored after the procedure.  Patient was given post procedure and discharge instructions to follow at home.  The patient was discharged in a stable condition  \  Event Time In   In Facility 1348   In Pre-Procedure 1422   Physician Available    Anesthesia Available    Pre-Op: Bedside Procedure Start    Pre-Op: Bedside Procedure Stop    Pre-Procedure Complete 1502   Out of Pre-Procedure    Anesthesia Start    Anesthesia Start Data Collection    Setup Start     Setup Complete    In Room 1504   Prep Start    Procedure Prep Complete    Procedure Start 1517   Procedure Closing    Emergence    Procedure Finish 1550   Sedation Start 1503   Scope In    Extent Reached    Scope Out    Sedation End 1553   Out of Room 1553   Cleanup Start    Cleanup Complete    Cosmetic Start    Cosmetic Stop    Pain Mgmt In Room    Pain Mgmt Out Room    In Recovery    Anesthesia Finish    Bedside Procedure Start    Bedside Procedure Stop    Recovery Care Complete    Out of Recovery    To Phase II    In Phase II    Pain Mgmt Recovery Start    Pain Mgmt Recovery Stop    Obs Rec Start    Obs Rec Stop    Phase II Care Complete    Out of Phase II    Procedural Care Complete    Discharge    Pain Follow Up Needed    Pain Follow Up Complete      Moderate sedation was achieved with midazolam 1mg and fentanyl 25mcg.  Continuous monitoring of EKG, blood pressure and pulse oximetry was provided by a registered nurse during the entire course of the procedure under my supervision and recorded in the patient's medical record.   Total time for sedation was 50 minutes.

## 2020-12-14 NOTE — H&P
CC: Back pain    HPI: The patient is a 81yo man with a history of lumbar spondylosis here for bilateral L1,2,3,4,5. There are no major changes in history and physical from 11/4/20.    Past Medical History:   Diagnosis Date    Abnormal PSA     Basal cell carcinoma of left ear 12/2/2015    Closed fracture of left tibial plateau with routine healing s/p ORIF on 7/15/2016 7/5/2016    Coronary artery disease (CAD) excluded 10/22/2018    DVT (deep venous thrombosis)     Essential hypertension, benign 12/2/2015    Hyperlipidemia     Pancreatic cancer 10/2018    Renal disorder     RECENT UTI AND BLOODY URINE IN NOVEMBER 2020.    Thyroid disease     Type 2 diabetes mellitus with complication 12/2/2015       Past Surgical History:   Procedure Laterality Date    BASAL CELL CARCINOMA EXCISION  03/17/2017    BLADDER SURGERY      COLONOSCOPY N/A 9/7/2018    Procedure: COLONOSCOPY;  Surgeon: Olu Ahn MD;  Location: [facility] ENDO;  Service: Endoscopy;  Laterality: N/A;    CORONARY ANGIOGRAPHY N/A 10/22/2018    Procedure: ANGIOGRAM, CORONARY ARTERY;  Surgeon: Kevin Huizar MD;  Location: [facility] CATH;  Service: Cardiovascular;  Laterality: N/A;    ENDOSCOPIC ULTRASOUND OF UPPER GASTROINTESTINAL TRACT Left 9/11/2018    Procedure: ULTRASOUND, ENDOSCOPIC, UPPER GI TRACT;  Surgeon: Joss May MD;  Location: [facility] ENDO;  Service: Endoscopy;  Laterality: Left;  Linear scope    EPIDURAL STEROID INJECTION INTO LUMBAR SPINE N/A 8/25/2020    Procedure: Injection-steroid-epidural-lumbar, L5/S1;  Surgeon: Galindo Kimbrough MD;  Location: [facility] OR;  Service: Pain Management;  Laterality: N/A;    EPIDURAL STEROID INJECTION INTO LUMBAR SPINE N/A 10/19/2020    Procedure: Injection-steroid-epidural-lumbar, L5/S1;  Surgeon: Galindo Kimbrough MD;  Location: [facility] OR;  Service: Pain Management;  Laterality: N/A;    ESOPHAGOGASTRODUODENOSCOPY N/A 9/11/2018    Procedure: EGD (ESOPHAGOGASTRODUODENOSCOPY);  Surgeon:  Joss May MD;  Location[facility];  Service: Endoscopy;  Laterality: N/A;    ESOPHAGOGASTRODUODENOSCOPY N/A 10/2/2020    Procedure: EGD (ESOPHAGOGASTRODUODENOSCOPY);  Surgeon: Olu Ahn MD;  Location: [facility] ENDO;  Service: Endoscopy;  Laterality: N/A;    EXTERNAL FIXATION TIBIAL FRACTURE  07/05/2016    FIXATION KYPHOPLASTY N/A 8/3/2020    Procedure: Kyphoplasty L2 and L3;  Surgeon: Galindo Kimbrough MD;  Location: [facility] OR;  Service: Pain Management;  Laterality: N/A;    FIXATION KYPHOPLASTY N/A 8/12/2020    Procedure: Kyphoplasty T12 and L1;  Surgeon: Galindo Kimbrough MD;  Location: [facility] OR;  Service: Pain Management;  Laterality: N/A;    HERNIA REPAIR      INJECTION OF ANESTHETIC AGENT AROUND MEDIAL BRANCH NERVES INNERVATING LUMBAR FACET JOINT Bilateral 11/30/2020    Procedure: Block-nerve-medial branch-lumbar L1, 2, 3, 4, 5;  Surgeon: Galindo Kimbrough MD;  Location: [facility] OR;  Service: Pain Management;  Laterality: Bilateral;    JOINT REPLACEMENT Left     hip    LEFT HEART CATHETERIZATION Left 10/22/2018    Procedure: Left heart cath;  Surgeon: Kevin Huizar MD;  Location: [facility] CATH;  Service: Cardiovascular;  Laterality: Left;    ORIF TIBIA FRACTURE  07/15/2016    PANCREAS SURGERY  10/31/2018    PARTIAL HIP ARTHROPLASTY      PROSTATE BIOPSY         Family History   Problem Relation Age of Onset    Cancer Sister     Cancer Brother     Diabetes Mother     Diabetes Sister        Social History     Socioeconomic History    Marital status:      Spouse name: Not on file    Number of children: Not on file    Years of education: Not on file    Highest education level: Not on file   Occupational History    Not on file   Social Needs    Financial resource strain: Not on file    Food insecurity     Worry: Not on file     Inability: Not on file    Transportation needs     Medical: Not on file     Non-medical: Not on file   Tobacco Use    Smoking status: Current  "Every Day Smoker     Packs/day: 2.00     Years: 60.00     Pack years: 120.00    Smokeless tobacco: Never Used   Substance and Sexual Activity    Alcohol use: No     Alcohol/week: 0.0 standard drinks    Drug use: No    Sexual activity: Not Currently   Lifestyle    Physical activity     Days per week: Not on file     Minutes per session: Not on file    Stress: Not on file   Relationships    Social connections     Talks on phone: Not on file     Gets together: Not on file     Attends Tenriism service: Not on file     Active member of club or organization: Not on file     Attends meetings of clubs or organizations: Not on file     Relationship status: Not on file   Other Topics Concern    Not on file   Social History Narrative    Not on file       No current facility-administered medications for this encounter.        Review of patient's allergies indicates:  No Known Allergies    Vitals:    12/14/20 1443 12/14/20 1447   BP: (!) 183/94 (!) 183/94   Pulse: 68    Resp: 17    Temp: 97.7 °F (36.5 °C)    TempSrc: Skin    SpO2: 100%    Weight: 56.7 kg (125 lb)    Height: 5' 10" (1.778 m)        ASA 3, mallampati 2      REVIEW OF SYSTEMS:     GENERAL: No weight loss, malaise or fevers.  HEENT:  No recent changes in vision or hearing  NECK: Negative for lumps, no difficulty with swallowing.  RESPIRATORY: Negative for cough, wheezing or shortness of breath, patient denies any recent URI.  CARDIOVASCULAR: Negative for chest pain, leg swelling or palpitations.  GI: Negative for abdominal discomfort, blood in stools or black stools or change in bowel habits.  MUSCULOSKELETAL: See HPI.  SKIN: Negative for lesions, rash, and itching.  PSYCH: No suicidal or homicidal ideations, no current mood disturbances.  HEMATOLOGY/LYMPHOLOGY: Negative for prolonged bleeding, bruising easily or swollen nodes. Patient is not currently taking any anti-coagulants  ENDO: No history of diabetes or thyroid dysfunction  NEURO: No history of " syncope, paralysis, seizures or tremors.All other reviewed and negative other than HPI.    Physical exam:  Gen: A and O x3, pleasant, well-groomed  Skin: No rashes or obvious lesions  HEENT: PERRLA, no obvious deformities on ears or in canals. No thyroid masses, trachea midline, no palpable lymph nodes in neck, axilla.  CVS: Regular rate and rhythm, normal S1 and S2, no murmurs.  Resp: Clear to auscultation bilaterally.  Abdomen: Soft, NT/ND, normal bowel sounds present.  Musculoskeletal/Neuro: Moving all extremities    Assessment:  Lumbar spondylosis  -     Case Request Operating Room: Radiofrequency Ablation, Nerve, Spinal, Lumbar, L1, 2, 3, 4, 5  -     Place in Outpatient; Standing  -     Diet NPO; Standing  -     lactated ringers infusion  -     Notify physician ; Standing  -     Notify physician ; Standing  -     Notify physician (specify); Standing  -     Place 18-22 gauage peripheral IV ; Standing  -     Verify informed consent; Standing  -     Vital signs; Standing  -     Place sequential compression device; Standing    Other orders  -     Progressive Mobility Protocol (mobilize patient to their highest level of functioning at least twice daily); Standing  -     IP VTE HIGH RISK PATIENT; Standing  -     POCT Glucose, Hand-Held Device; Standing

## 2020-12-14 NOTE — DISCHARGE SUMMARY
[facility]  Discharge Note  Short Stay    Procedure(s) (LRB):  Radiofrequency Ablation, Nerve, Spinal, Lumbar, L1, 2, 3, 4, 5 (Bilateral)    OUTCOME: Patient tolerated treatment/procedure well without complication and is now ready for discharge.    DISPOSITION: Home or Self Care    FINAL DIAGNOSIS:  Lumbar spondylosis    FOLLOWUP: In clinic    DISCHARGE INSTRUCTIONS:  No discharge procedures on file.

## 2020-12-21 ENCOUNTER — PATIENT MESSAGE (OUTPATIENT)
Dept: OTHER | Facility: OTHER | Age: 80
End: 2020-12-21

## 2021-01-01 ENCOUNTER — OFFICE VISIT (OUTPATIENT)
Dept: PAIN MEDICINE | Facility: CLINIC | Age: 81
End: 2021-01-01
Payer: MEDICARE

## 2021-01-01 ENCOUNTER — LAB VISIT (OUTPATIENT)
Dept: FAMILY MEDICINE | Facility: CLINIC | Age: 81
End: 2021-01-01
Payer: MEDICARE

## 2021-01-01 VITALS
SYSTOLIC BLOOD PRESSURE: 148 MMHG | OXYGEN SATURATION: 96 % | RESPIRATION RATE: 18 BRPM | TEMPERATURE: 99 F | HEART RATE: 60 BPM | DIASTOLIC BLOOD PRESSURE: 68 MMHG

## 2021-01-01 DIAGNOSIS — R53.1 GENERALIZED WEAKNESS: ICD-10-CM

## 2021-01-01 DIAGNOSIS — R29.898 MUSCULAR DECONDITIONING: ICD-10-CM

## 2021-01-01 DIAGNOSIS — M51.36 DDD (DEGENERATIVE DISC DISEASE), LUMBAR: ICD-10-CM

## 2021-01-01 DIAGNOSIS — E08.42 DIABETIC POLYNEUROPATHY ASSOCIATED WITH DIABETES MELLITUS DUE TO UNDERLYING CONDITION: ICD-10-CM

## 2021-01-01 DIAGNOSIS — E11.8 TYPE 2 DIABETES MELLITUS WITH COMPLICATION: ICD-10-CM

## 2021-01-01 DIAGNOSIS — M47.816 LUMBAR SPONDYLOSIS: Primary | ICD-10-CM

## 2021-01-01 LAB
ANION GAP SERPL CALC-SCNC: 7 MMOL/L (ref 8–16)
BUN SERPL-MCNC: 12 MG/DL (ref 8–23)
CALCIUM SERPL-MCNC: 9.4 MG/DL (ref 8.7–10.5)
CHLORIDE SERPL-SCNC: 102 MMOL/L (ref 95–110)
CO2 SERPL-SCNC: 32 MMOL/L (ref 23–29)
CREAT SERPL-MCNC: 0.7 MG/DL (ref 0.5–1.4)
EST. GFR  (AFRICAN AMERICAN): >60 ML/MIN/1.73 M^2
EST. GFR  (NON AFRICAN AMERICAN): >60 ML/MIN/1.73 M^2
GLUCOSE SERPL-MCNC: 219 MG/DL (ref 70–110)
POTASSIUM SERPL-SCNC: 4.1 MMOL/L (ref 3.5–5.1)
SODIUM SERPL-SCNC: 141 MMOL/L (ref 136–145)

## 2021-01-01 PROCEDURE — 99213 OFFICE O/P EST LOW 20 MIN: CPT | Mod: S$PBB,,, | Performed by: ANESTHESIOLOGY

## 2021-01-01 PROCEDURE — 99214 OFFICE O/P EST MOD 30 MIN: CPT | Mod: PBBFAC,PN | Performed by: ANESTHESIOLOGY

## 2021-01-01 PROCEDURE — 80048 BASIC METABOLIC PNL TOTAL CA: CPT | Performed by: FAMILY MEDICINE

## 2021-01-01 PROCEDURE — 99999 PR PBB SHADOW E&M-EST. PATIENT-LVL IV: CPT | Mod: PBBFAC,,, | Performed by: ANESTHESIOLOGY

## 2021-01-01 PROCEDURE — 99213 PR OFFICE/OUTPT VISIT, EST, LEVL III, 20-29 MIN: ICD-10-PCS | Mod: S$PBB,,, | Performed by: ANESTHESIOLOGY

## 2021-01-01 PROCEDURE — 99999 PR PBB SHADOW E&M-EST. PATIENT-LVL IV: ICD-10-PCS | Mod: PBBFAC,,, | Performed by: ANESTHESIOLOGY

## 2021-01-01 RX ORDER — HYDROMORPHONE HYDROCHLORIDE 2 MG/1
2 TABLET ORAL 2 TIMES DAILY PRN
Qty: 60 TABLET | Refills: 0 | Status: SHIPPED | OUTPATIENT
Start: 2021-01-01 | End: 2021-01-01 | Stop reason: SDUPTHER

## 2021-01-01 RX ORDER — HYDROMORPHONE HYDROCHLORIDE 2 MG/1
2 TABLET ORAL 2 TIMES DAILY PRN
Qty: 60 TABLET | Refills: 0 | Status: SHIPPED | OUTPATIENT
Start: 2022-01-01 | End: 2022-01-01 | Stop reason: SDUPTHER

## 2021-01-01 RX ORDER — HYDROMORPHONE HYDROCHLORIDE 2 MG/1
2 TABLET ORAL 2 TIMES DAILY PRN
Qty: 60 TABLET | Refills: 0 | Status: SHIPPED | OUTPATIENT
Start: 2021-01-01 | End: 2022-01-01

## 2021-01-06 ENCOUNTER — PATIENT MESSAGE (OUTPATIENT)
Dept: PAIN MEDICINE | Facility: CLINIC | Age: 81
End: 2021-01-06

## 2021-01-06 DIAGNOSIS — R26.81 GAIT INSTABILITY: ICD-10-CM

## 2021-01-06 DIAGNOSIS — M47.816 LUMBAR SPONDYLOSIS: Primary | ICD-10-CM

## 2021-01-06 DIAGNOSIS — R53.1 GENERALIZED WEAKNESS: ICD-10-CM

## 2021-01-20 ENCOUNTER — OFFICE VISIT (OUTPATIENT)
Dept: PAIN MEDICINE | Facility: CLINIC | Age: 81
End: 2021-01-20
Payer: MEDICARE

## 2021-01-20 VITALS
TEMPERATURE: 98 F | DIASTOLIC BLOOD PRESSURE: 75 MMHG | HEART RATE: 78 BPM | OXYGEN SATURATION: 97 % | BODY MASS INDEX: 17.37 KG/M2 | RESPIRATION RATE: 20 BRPM | SYSTOLIC BLOOD PRESSURE: 132 MMHG | WEIGHT: 121.06 LBS

## 2021-01-20 DIAGNOSIS — R26.81 GAIT INSTABILITY: ICD-10-CM

## 2021-01-20 DIAGNOSIS — M51.36 DDD (DEGENERATIVE DISC DISEASE), LUMBAR: ICD-10-CM

## 2021-01-20 DIAGNOSIS — R29.898 MUSCULAR DECONDITIONING: ICD-10-CM

## 2021-01-20 DIAGNOSIS — M47.816 LUMBAR SPONDYLOSIS: Primary | ICD-10-CM

## 2021-01-20 PROCEDURE — 99214 OFFICE O/P EST MOD 30 MIN: CPT | Mod: S$PBB,,, | Performed by: PHYSICIAN ASSISTANT

## 2021-01-20 PROCEDURE — 99999 PR PBB SHADOW E&M-EST. PATIENT-LVL IV: ICD-10-PCS | Mod: PBBFAC,,, | Performed by: PHYSICIAN ASSISTANT

## 2021-01-20 PROCEDURE — 99214 PR OFFICE/OUTPT VISIT, EST, LEVL IV, 30-39 MIN: ICD-10-PCS | Mod: S$PBB,,, | Performed by: PHYSICIAN ASSISTANT

## 2021-01-20 PROCEDURE — 99999 PR PBB SHADOW E&M-EST. PATIENT-LVL IV: CPT | Mod: PBBFAC,,, | Performed by: PHYSICIAN ASSISTANT

## 2021-01-20 PROCEDURE — 99214 OFFICE O/P EST MOD 30 MIN: CPT | Mod: PBBFAC,PN | Performed by: PHYSICIAN ASSISTANT

## 2021-01-21 RX ORDER — HYDROMORPHONE HYDROCHLORIDE 2 MG/1
2 TABLET ORAL 2 TIMES DAILY PRN
Qty: 60 TABLET | Refills: 0 | Status: SHIPPED | OUTPATIENT
Start: 2021-01-21 | End: 2021-02-26 | Stop reason: SDUPTHER

## 2021-02-24 PROBLEM — I77.811 ABDOMINAL AORTIC ECTASIA: Status: ACTIVE | Noted: 2021-02-24

## 2021-02-24 PROBLEM — J44.9 CHRONIC OBSTRUCTIVE PULMONARY DISEASE: Status: ACTIVE | Noted: 2021-02-24

## 2021-02-24 PROBLEM — I20.89 ANGINAL EQUIVALENT: Status: ACTIVE | Noted: 2021-02-24

## 2021-02-26 ENCOUNTER — OFFICE VISIT (OUTPATIENT)
Dept: PAIN MEDICINE | Facility: CLINIC | Age: 81
End: 2021-02-26
Payer: MEDICARE

## 2021-02-26 VITALS
OXYGEN SATURATION: 95 % | HEART RATE: 77 BPM | SYSTOLIC BLOOD PRESSURE: 139 MMHG | WEIGHT: 127.44 LBS | BODY MASS INDEX: 18.28 KG/M2 | DIASTOLIC BLOOD PRESSURE: 66 MMHG | RESPIRATION RATE: 18 BRPM | TEMPERATURE: 97 F

## 2021-02-26 DIAGNOSIS — E08.42 DIABETIC POLYNEUROPATHY ASSOCIATED WITH DIABETES MELLITUS DUE TO UNDERLYING CONDITION: Primary | ICD-10-CM

## 2021-02-26 DIAGNOSIS — M47.816 LUMBAR SPONDYLOSIS: ICD-10-CM

## 2021-02-26 DIAGNOSIS — R26.81 GAIT INSTABILITY: ICD-10-CM

## 2021-02-26 DIAGNOSIS — R29.898 MUSCULAR DECONDITIONING: ICD-10-CM

## 2021-02-26 PROCEDURE — 99213 OFFICE O/P EST LOW 20 MIN: CPT | Mod: S$PBB,,, | Performed by: ANESTHESIOLOGY

## 2021-02-26 PROCEDURE — 99213 OFFICE O/P EST LOW 20 MIN: CPT | Mod: PBBFAC,PN | Performed by: ANESTHESIOLOGY

## 2021-02-26 PROCEDURE — 99999 PR PBB SHADOW E&M-EST. PATIENT-LVL III: CPT | Mod: PBBFAC,,, | Performed by: ANESTHESIOLOGY

## 2021-02-26 PROCEDURE — 99213 PR OFFICE/OUTPT VISIT, EST, LEVL III, 20-29 MIN: ICD-10-PCS | Mod: S$PBB,,, | Performed by: ANESTHESIOLOGY

## 2021-02-26 PROCEDURE — 99999 PR PBB SHADOW E&M-EST. PATIENT-LVL III: ICD-10-PCS | Mod: PBBFAC,,, | Performed by: ANESTHESIOLOGY

## 2021-02-26 RX ORDER — HYDROMORPHONE HYDROCHLORIDE 2 MG/1
2 TABLET ORAL 2 TIMES DAILY PRN
Qty: 60 TABLET | Refills: 0 | Status: SHIPPED | OUTPATIENT
Start: 2021-02-26 | End: 2021-04-19 | Stop reason: SDUPTHER

## 2021-02-26 RX ORDER — GABAPENTIN 100 MG/1
100 CAPSULE ORAL NIGHTLY
Qty: 60 CAPSULE | Refills: 2 | Status: SHIPPED | OUTPATIENT
Start: 2021-02-26 | End: 2021-05-24

## 2021-04-19 RX ORDER — HYDROMORPHONE HYDROCHLORIDE 2 MG/1
2 TABLET ORAL 2 TIMES DAILY PRN
Qty: 60 TABLET | Refills: 0 | Status: SHIPPED | OUTPATIENT
Start: 2021-04-19 | End: 2021-06-01 | Stop reason: SDUPTHER

## 2021-04-29 ENCOUNTER — OFFICE VISIT (OUTPATIENT)
Dept: PAIN MEDICINE | Facility: CLINIC | Age: 81
End: 2021-04-29
Payer: MEDICARE

## 2021-04-29 VITALS
DIASTOLIC BLOOD PRESSURE: 64 MMHG | HEART RATE: 68 BPM | SYSTOLIC BLOOD PRESSURE: 132 MMHG | RESPIRATION RATE: 20 BRPM | TEMPERATURE: 98 F | OXYGEN SATURATION: 95 %

## 2021-04-29 DIAGNOSIS — E08.42 DIABETIC POLYNEUROPATHY ASSOCIATED WITH DIABETES MELLITUS DUE TO UNDERLYING CONDITION: ICD-10-CM

## 2021-04-29 DIAGNOSIS — R26.81 GAIT INSTABILITY: ICD-10-CM

## 2021-04-29 DIAGNOSIS — M47.816 LUMBAR SPONDYLOSIS: Primary | ICD-10-CM

## 2021-04-29 DIAGNOSIS — R29.898 MUSCULAR DECONDITIONING: ICD-10-CM

## 2021-04-29 DIAGNOSIS — M51.36 DDD (DEGENERATIVE DISC DISEASE), LUMBAR: ICD-10-CM

## 2021-04-29 PROCEDURE — 99999 PR PBB SHADOW E&M-EST. PATIENT-LVL IV: ICD-10-PCS | Mod: PBBFAC,,, | Performed by: ANESTHESIOLOGY

## 2021-04-29 PROCEDURE — 99213 OFFICE O/P EST LOW 20 MIN: CPT | Mod: S$PBB,,, | Performed by: ANESTHESIOLOGY

## 2021-04-29 PROCEDURE — 99999 PR PBB SHADOW E&M-EST. PATIENT-LVL IV: CPT | Mod: PBBFAC,,, | Performed by: ANESTHESIOLOGY

## 2021-04-29 PROCEDURE — 99214 OFFICE O/P EST MOD 30 MIN: CPT | Mod: PBBFAC,PN | Performed by: ANESTHESIOLOGY

## 2021-04-29 PROCEDURE — 99213 PR OFFICE/OUTPT VISIT, EST, LEVL III, 20-29 MIN: ICD-10-PCS | Mod: S$PBB,,, | Performed by: ANESTHESIOLOGY

## 2021-04-29 RX ORDER — GABAPENTIN 300 MG/1
300 CAPSULE ORAL NIGHTLY
Qty: 30 CAPSULE | Refills: 1 | Status: SHIPPED | OUTPATIENT
Start: 2021-04-29 | End: 2021-05-31 | Stop reason: SDUPTHER

## 2021-05-13 ENCOUNTER — PATIENT MESSAGE (OUTPATIENT)
Dept: PAIN MEDICINE | Facility: CLINIC | Age: 81
End: 2021-05-13

## 2021-05-25 PROBLEM — N18.31 STAGE 3A CHRONIC KIDNEY DISEASE: Status: ACTIVE | Noted: 2021-05-25

## 2021-05-31 ENCOUNTER — TELEPHONE (OUTPATIENT)
Dept: PAIN MEDICINE | Facility: CLINIC | Age: 81
End: 2021-05-31

## 2021-06-01 RX ORDER — GABAPENTIN 300 MG/1
300 CAPSULE ORAL NIGHTLY
Qty: 30 CAPSULE | Refills: 1 | Status: SHIPPED | OUTPATIENT
Start: 2021-06-01 | End: 2021-06-01 | Stop reason: SDUPTHER

## 2021-06-01 RX ORDER — GABAPENTIN 300 MG/1
600 CAPSULE ORAL NIGHTLY
Qty: 30 CAPSULE | Refills: 5 | Status: SHIPPED | OUTPATIENT
Start: 2021-06-01 | End: 2022-01-01 | Stop reason: CLARIF

## 2021-06-01 RX ORDER — HYDROMORPHONE HYDROCHLORIDE 2 MG/1
2 TABLET ORAL 2 TIMES DAILY PRN
Qty: 60 TABLET | Refills: 0 | Status: SHIPPED | OUTPATIENT
Start: 2021-06-01 | End: 2021-06-29 | Stop reason: SDUPTHER

## 2021-06-14 ENCOUNTER — TELEPHONE (OUTPATIENT)
Dept: ENDOCRINOLOGY | Facility: CLINIC | Age: 81
End: 2021-06-14

## 2021-06-15 ENCOUNTER — OFFICE VISIT (OUTPATIENT)
Dept: ENDOCRINOLOGY | Facility: CLINIC | Age: 81
End: 2021-06-15
Payer: MEDICARE

## 2021-06-15 VITALS
OXYGEN SATURATION: 96 % | SYSTOLIC BLOOD PRESSURE: 124 MMHG | WEIGHT: 130.31 LBS | HEIGHT: 70 IN | DIASTOLIC BLOOD PRESSURE: 70 MMHG | HEART RATE: 67 BPM | BODY MASS INDEX: 18.66 KG/M2

## 2021-06-15 DIAGNOSIS — E11.42 TYPE 2 DIABETES MELLITUS WITH DIABETIC POLYNEUROPATHY, WITHOUT LONG-TERM CURRENT USE OF INSULIN: Primary | ICD-10-CM

## 2021-06-15 DIAGNOSIS — C25.9 MALIGNANT NEOPLASM OF PANCREAS, UNSPECIFIED LOCATION OF MALIGNANCY: ICD-10-CM

## 2021-06-15 DIAGNOSIS — I10 ESSENTIAL HYPERTENSION, BENIGN: Chronic | ICD-10-CM

## 2021-06-15 DIAGNOSIS — E78.2 MIXED HYPERLIPIDEMIA: Chronic | ICD-10-CM

## 2021-06-15 DIAGNOSIS — S32.030G CLOSED COMPRESSION FRACTURE OF L3 LUMBAR VERTEBRA WITH DELAYED HEALING, SUBSEQUENT ENCOUNTER: ICD-10-CM

## 2021-06-15 PROCEDURE — 99204 OFFICE O/P NEW MOD 45 MIN: CPT | Mod: S$PBB,,, | Performed by: INTERNAL MEDICINE

## 2021-06-15 PROCEDURE — 99999 PR PBB SHADOW E&M-EST. PATIENT-LVL III: CPT | Mod: PBBFAC,,, | Performed by: INTERNAL MEDICINE

## 2021-06-15 PROCEDURE — 99213 OFFICE O/P EST LOW 20 MIN: CPT | Mod: PBBFAC,PO | Performed by: INTERNAL MEDICINE

## 2021-06-15 PROCEDURE — 99204 PR OFFICE/OUTPT VISIT, NEW, LEVL IV, 45-59 MIN: ICD-10-PCS | Mod: S$PBB,,, | Performed by: INTERNAL MEDICINE

## 2021-06-15 PROCEDURE — 99999 PR PBB SHADOW E&M-EST. PATIENT-LVL III: ICD-10-PCS | Mod: PBBFAC,,, | Performed by: INTERNAL MEDICINE

## 2021-06-29 ENCOUNTER — OFFICE VISIT (OUTPATIENT)
Dept: PAIN MEDICINE | Facility: CLINIC | Age: 81
End: 2021-06-29
Payer: MEDICARE

## 2021-06-29 VITALS
RESPIRATION RATE: 20 BRPM | HEART RATE: 63 BPM | OXYGEN SATURATION: 97 % | SYSTOLIC BLOOD PRESSURE: 146 MMHG | DIASTOLIC BLOOD PRESSURE: 75 MMHG | TEMPERATURE: 98 F

## 2021-06-29 DIAGNOSIS — M51.36 DDD (DEGENERATIVE DISC DISEASE), LUMBAR: ICD-10-CM

## 2021-06-29 DIAGNOSIS — E08.42 DIABETIC POLYNEUROPATHY ASSOCIATED WITH DIABETES MELLITUS DUE TO UNDERLYING CONDITION: ICD-10-CM

## 2021-06-29 DIAGNOSIS — M47.816 LUMBAR SPONDYLOSIS: Primary | ICD-10-CM

## 2021-06-29 DIAGNOSIS — R26.81 GAIT INSTABILITY: ICD-10-CM

## 2021-06-29 PROCEDURE — 99213 OFFICE O/P EST LOW 20 MIN: CPT | Mod: PBBFAC,PN | Performed by: ANESTHESIOLOGY

## 2021-06-29 PROCEDURE — 99213 OFFICE O/P EST LOW 20 MIN: CPT | Mod: S$PBB,,, | Performed by: ANESTHESIOLOGY

## 2021-06-29 PROCEDURE — 99999 PR PBB SHADOW E&M-EST. PATIENT-LVL III: ICD-10-PCS | Mod: PBBFAC,,, | Performed by: ANESTHESIOLOGY

## 2021-06-29 PROCEDURE — 99213 PR OFFICE/OUTPT VISIT, EST, LEVL III, 20-29 MIN: ICD-10-PCS | Mod: S$PBB,,, | Performed by: ANESTHESIOLOGY

## 2021-06-29 PROCEDURE — 99999 PR PBB SHADOW E&M-EST. PATIENT-LVL III: CPT | Mod: PBBFAC,,, | Performed by: ANESTHESIOLOGY

## 2021-06-29 RX ORDER — GABAPENTIN 600 MG/1
600 TABLET ORAL 2 TIMES DAILY
Qty: 60 TABLET | Refills: 36 | Status: SHIPPED | OUTPATIENT
Start: 2021-06-29 | End: 2022-01-01

## 2021-06-29 RX ORDER — HYDROMORPHONE HYDROCHLORIDE 2 MG/1
2 TABLET ORAL 2 TIMES DAILY PRN
Qty: 60 TABLET | Refills: 0 | Status: SHIPPED | OUTPATIENT
Start: 2021-06-29 | End: 2021-08-03 | Stop reason: SDUPTHER

## 2021-06-30 ENCOUNTER — PATIENT MESSAGE (OUTPATIENT)
Dept: ENDOCRINOLOGY | Facility: CLINIC | Age: 81
End: 2021-06-30

## 2021-08-03 RX ORDER — HYDROMORPHONE HYDROCHLORIDE 2 MG/1
2 TABLET ORAL 2 TIMES DAILY PRN
Qty: 60 TABLET | Refills: 0 | Status: SHIPPED | OUTPATIENT
Start: 2021-08-03 | End: 2021-01-01 | Stop reason: SDUPTHER

## 2022-01-01 ENCOUNTER — LAB VISIT (OUTPATIENT)
Dept: LAB | Facility: HOSPITAL | Age: 82
End: 2022-01-01
Attending: FAMILY MEDICINE
Payer: MEDICARE

## 2022-01-01 ENCOUNTER — DOCUMENT SCAN (OUTPATIENT)
Dept: HOME HEALTH SERVICES | Facility: HOSPITAL | Age: 82
End: 2022-01-01
Payer: MEDICARE

## 2022-01-01 ENCOUNTER — OFFICE VISIT (OUTPATIENT)
Dept: PAIN MEDICINE | Facility: CLINIC | Age: 82
End: 2022-01-01
Payer: MEDICARE

## 2022-01-01 ENCOUNTER — PATIENT MESSAGE (OUTPATIENT)
Dept: PAIN MEDICINE | Facility: CLINIC | Age: 82
End: 2022-01-01
Payer: MEDICARE

## 2022-01-01 ENCOUNTER — PATIENT OUTREACH (OUTPATIENT)
Dept: HOME HEALTH SERVICES | Facility: HOSPITAL | Age: 82
End: 2022-01-01
Payer: MEDICARE

## 2022-01-01 ENCOUNTER — EXTERNAL HOME HEALTH (OUTPATIENT)
Dept: HOME HEALTH SERVICES | Facility: HOSPITAL | Age: 82
End: 2022-01-01
Payer: MEDICARE

## 2022-01-01 VITALS
DIASTOLIC BLOOD PRESSURE: 75 MMHG | HEART RATE: 119 BPM | WEIGHT: 136.69 LBS | BODY MASS INDEX: 19.57 KG/M2 | HEIGHT: 70 IN | SYSTOLIC BLOOD PRESSURE: 120 MMHG

## 2022-01-01 DIAGNOSIS — R26.81 GAIT INSTABILITY: ICD-10-CM

## 2022-01-01 DIAGNOSIS — M51.36 DDD (DEGENERATIVE DISC DISEASE), LUMBAR: ICD-10-CM

## 2022-01-01 DIAGNOSIS — R53.1 GENERALIZED WEAKNESS: ICD-10-CM

## 2022-01-01 DIAGNOSIS — E08.42 DIABETIC POLYNEUROPATHY ASSOCIATED WITH DIABETES MELLITUS DUE TO UNDERLYING CONDITION: ICD-10-CM

## 2022-01-01 DIAGNOSIS — N18.6 TYPE 2 DIABETES MELLITUS WITH END-STAGE RENAL DISEASE: Primary | ICD-10-CM

## 2022-01-01 DIAGNOSIS — E11.22 TYPE 2 DIABETES MELLITUS WITH END-STAGE RENAL DISEASE: Primary | ICD-10-CM

## 2022-01-01 DIAGNOSIS — M47.816 LUMBAR SPONDYLOSIS: Primary | ICD-10-CM

## 2022-01-01 LAB
ESTIMATED AVG GLUCOSE: 177 MG/DL (ref 68–131)
HBA1C MFR BLD: 7.8 % (ref 4–5.6)

## 2022-01-01 PROCEDURE — 99214 OFFICE O/P EST MOD 30 MIN: CPT | Mod: PBBFAC,PN | Performed by: ANESTHESIOLOGY

## 2022-01-01 PROCEDURE — 99213 PR OFFICE/OUTPT VISIT, EST, LEVL III, 20-29 MIN: ICD-10-PCS | Mod: S$PBB,,, | Performed by: ANESTHESIOLOGY

## 2022-01-01 PROCEDURE — 99999 PR PBB SHADOW E&M-EST. PATIENT-LVL IV: CPT | Mod: PBBFAC,,, | Performed by: ANESTHESIOLOGY

## 2022-01-01 PROCEDURE — 83036 HEMOGLOBIN GLYCOSYLATED A1C: CPT | Performed by: FAMILY MEDICINE

## 2022-01-01 PROCEDURE — 99213 OFFICE O/P EST LOW 20 MIN: CPT | Mod: S$PBB,,, | Performed by: ANESTHESIOLOGY

## 2022-01-01 PROCEDURE — 99999 PR PBB SHADOW E&M-EST. PATIENT-LVL IV: ICD-10-PCS | Mod: PBBFAC,,, | Performed by: ANESTHESIOLOGY

## 2022-01-01 RX ORDER — HYDROMORPHONE HYDROCHLORIDE 2 MG/1
2 TABLET ORAL 2 TIMES DAILY PRN
Qty: 60 TABLET | Refills: 0 | Status: SHIPPED | OUTPATIENT
Start: 2022-01-01 | End: 2022-01-01

## 2022-01-01 RX ORDER — MIRTAZAPINE 45 MG/1
45 TABLET, FILM COATED ORAL NIGHTLY
COMMUNITY

## 2022-01-01 RX ORDER — HYDROMORPHONE HYDROCHLORIDE 2 MG/1
2 TABLET ORAL 2 TIMES DAILY PRN
Qty: 60 TABLET | Refills: 0 | Status: SHIPPED | OUTPATIENT
Start: 2022-01-01 | End: 2022-01-01 | Stop reason: SDUPTHER

## 2022-01-18 PROBLEM — Z71.89 ADVANCE CARE PLANNING: Status: ACTIVE | Noted: 2022-01-01

## 2022-01-18 PROBLEM — K59.00 CONSTIPATION: Status: ACTIVE | Noted: 2022-01-01

## 2022-01-18 PROBLEM — J96.01 ACUTE HYPOXEMIC RESPIRATORY FAILURE: Status: ACTIVE | Noted: 2022-01-01

## 2022-01-18 PROBLEM — R79.89 POSITIVE D DIMER: Status: ACTIVE | Noted: 2022-01-01

## 2022-01-18 PROBLEM — J20.9 ACUTE BRONCHITIS: Status: ACTIVE | Noted: 2022-01-01

## 2022-01-18 PROBLEM — R07.81 PLEURITIC CHEST PAIN: Status: ACTIVE | Noted: 2022-01-01

## 2022-01-19 PROBLEM — J10.1 INFLUENZA B: Status: ACTIVE | Noted: 2022-01-01

## 2022-02-24 NOTE — TELEPHONE ENCOUNTER
----- Message from Abdullahi Alex, Patient Care Assistant sent at 2/24/2022 10:07 AM CST -----  Contact: Pt  Type:  RX Refill Request    Who Called:  Pt Wife  Refill or New Rx:  Refill  RX Name and Strength:  HYDROmorphone (DILAUDID) 2 MG tablet  How is the patient currently taking it? (ex. 1XDay):  As Directed  Is this a 30 day or 90 day RX:  60  Preferred Pharmacy with phone number:    [facility] [place] [address]  Phone: [phone] Fax: [phone]      Local or Mail Order:  local  Ordering Provider:  Luis E Petit Call Back Number:  [phone]    Additional Information:  Please contact pt upon completion-Thank you~

## 2022-03-09 NOTE — PROGRESS NOTES
This note was completed with dictation software and grammatical errors may exist.    CC:  Back pain, bilateral foot pain     HPI:  The patient is an 81-year-old man with a history of pancreatic cancer, tobacco use who presents in referral from Dr. Rajeev Oliver for back pain.  Returns in follow-up today for back pain, bilateral leg pain.  Since I had last seen him he developed hypoxia, was hospitalized, it was determined that he likely had the flu, sounds like he had a pneumothorax at 1 point as well.  He was sent on home O2, has weaned off of this and feels that he is doing better in terms of his breathing.  He has had some continued weakness in his legs and feels more deconditioned after his hospital stay and actually fell on his bilateral buttocks about a week ago.  He states that the buttock pain is improving, denies any problems with walking but again, has chronic back pain that has not improved and actually feels somewhat worse lately.  He has been taking medication, 2 mg hydromorphone several times a day, primarily it helps at night for him to go to sleep.    Previous history:  The patient reports that 2 months ago he was lifting a trailer and felt a sudden back pain.  Since then he has had continued severe back pain.  Is located in the low back and into the sacrum.  He states that it radiates out to the side slightly but denies any radiation into the legs.  He describes it as burning, throbbing, grabbing, tight, tingling, numb, sharp, electric shooting and hot and cold.  It is worse with prolonged sitting or standing, doing any walking, bending, flexing, lifting and with getting out of a bed or a chair, causes difficulty with trying to stand up, he feels weak in the legs.  He has been mostly sitting in a wheelchair, not walking much on his own, having difficulty timing issues and getting dressed.  He gets some relief with lying down and with medications.  When he 1st was injured, this was diagnosis of  possible muscle strain, he continued to have severe pain however.  He ended up developing what sounds like a urinary tract infection and had been admitted to a hospital where they found renal calculi but on a CT scan they also noted an L3 compression fracture of unclear age but he did not have this several months prior according to other imaging of that area.    Pain intervention history: He is status post L2 and L3 kyphoplasty on 08/03/2020.  He is status post T12 and L1 kyphoplasty on 8/12/20 with no relief.  He is status post L5/S1 interlaminar epidural steroid injection on 08/25/2020 with moderate relief.   He is status post L5/S1 interlaminar epidural steroid injection on 10/19/2020 with only slight relief.   He is status post bilateral L1, 2, 3, 4, 5 medial branch radiofrequency ablation on 12/14/2020 with 0% relief.    Antineuropathics:  Gabapentin 600  NSAIDs:  Physical therapy:  Antidepressants:  Muscle relaxers:  Opioids: He has been taking hydrocodone 7.5/325 every 6 hours and states that it does not help much.  He was actually started on tramadol, went to 5 milligram hydrocodone and eventually on 7.5 and he states that it does nothing.  Currently taking hydromorphone 2mg  Antiplatelets/Anticoagulants:    ROS:  He reports weight loss, vision change, eye pain, cough, appetite change, diarrhea, easy bruising, urinary urgency, renal calculi, loss of balance.  Balance of review systems is negative.    Lab Results   Component Value Date    LABA1C 7.1 01/06/2020    HGBA1C 8.9 (H) 11/29/2021       Lab Results   Component Value Date    WBC 7.48 01/18/2022    HGB 14.5 01/18/2022    HCT 43.8 01/18/2022    MCV 88 01/18/2022     01/18/2022         Past Medical History:   Diagnosis Date    Abnormal PSA     Basal cell carcinoma of left ear 12/2/2015    Closed fracture of left tibial plateau with routine healing s/p ORIF on 7/15/2016 7/5/2016    Coronary artery disease (CAD) excluded 10/22/2018    DVT (deep  venous thrombosis)     Essential hypertension, benign 12/2/2015    Hyperlipidemia     Pancreatic cancer 10/2018    Renal disorder     RECENT UTI AND BLOODY URINE IN NOVEMBER 2020.    Thyroid disease     Type 2 diabetes mellitus with complication 12/2/2015       Past Surgical History:   Procedure Laterality Date    BASAL CELL CARCINOMA EXCISION  03/17/2017    BLADDER SURGERY      COLONOSCOPY N/A 9/7/2018    Procedure: COLONOSCOPY;  Surgeon: Olu Ahn MD;  Location: [facility] ENDO;  Service: Endoscopy;  Laterality: N/A;    CORONARY ANGIOGRAPHY N/A 10/22/2018    Procedure: ANGIOGRAM, CORONARY ARTERY;  Surgeon: Kevin Huizar MD;  Location: [facility] CATH;  Service: Cardiovascular;  Laterality: N/A;    ENDOSCOPIC ULTRASOUND OF UPPER GASTROINTESTINAL TRACT Left 9/11/2018    Procedure: ULTRASOUND, ENDOSCOPIC, UPPER GI TRACT;  Surgeon: Joss May MD;  Location: [facility] ENDO;  Service: Endoscopy;  Laterality: Left;  Linear scope    EPIDURAL STEROID INJECTION INTO LUMBAR SPINE N/A 8/25/2020    Procedure: Injection-steroid-epidural-lumbar, L5/S1;  Surgeon: Galindo Kimbrough MD;  Location: [facility] OR;  Service: Pain Management;  Laterality: N/A;    EPIDURAL STEROID INJECTION INTO LUMBAR SPINE N/A 10/19/2020    Procedure: Injection-steroid-epidural-lumbar, L5/S1;  Surgeon: Galindo Kimbrough MD;  Location: [facility] OR;  Service: Pain Management;  Laterality: N/A;    ESOPHAGOGASTRODUODENOSCOPY N/A 9/11/2018    Procedure: EGD (ESOPHAGOGASTRODUODENOSCOPY);  Surgeon: Joss May MD;  Location: [facility] ENDO;  Service: Endoscopy;  Laterality: N/A;    ESOPHAGOGASTRODUODENOSCOPY N/A 10/2/2020    Procedure: EGD (ESOPHAGOGASTRODUODENOSCOPY);  Surgeon: Olu Ahn MD;  Location: [facility] ENDO;  Service: Endoscopy;  Laterality: N/A;    EXTERNAL FIXATION TIBIAL FRACTURE  07/05/2016    FIXATION KYPHOPLASTY N/A 8/3/2020    Procedure: Kyphoplasty L2 and L3;  Surgeon: Galindo Kimbrough MD;  Location: [facility] OR;   "Service: Pain Management;  Laterality: N/A;    FIXATION KYPHOPLASTY N/A 8/12/2020    Procedure: Kyphoplasty T12 and L1;  Surgeon: Galindo Kimbrough MD;  Location: [facility] OR;  Service: Pain Management;  Laterality: N/A;    HERNIA REPAIR      INJECTION OF ANESTHETIC AGENT AROUND MEDIAL BRANCH NERVES INNERVATING LUMBAR FACET JOINT Bilateral 11/30/2020    Procedure: Block-nerve-medial branch-lumbar L1, 2, 3, 4, 5;  Surgeon: Galindo Kimbrough MD;  Location: [facility] OR;  Service: Pain Management;  Laterality: Bilateral;    JOINT REPLACEMENT Left     hip    LEFT HEART CATHETERIZATION Left 10/22/2018    Procedure: Left heart cath;  Surgeon: Kevin Huizar MD;  Location: [facility] CATH;  Service: Cardiovascular;  Laterality: Left;    ORIF TIBIA FRACTURE  07/15/2016    PANCREAS SURGERY  10/31/2018    PARTIAL HIP ARTHROPLASTY      PROSTATE BIOPSY      RADIOFREQUENCY ABLATION OF LUMBAR MEDIAL BRANCH NERVE AT SINGLE LEVEL Bilateral 12/14/2020    Procedure: Radiofrequency Ablation, Nerve, Spinal, Lumbar, L1, 2, 3, 4, 5;  Surgeon: Galindo Kimbrough MD;  Location: [facility] OR;  Service: Pain Management;  Laterality: Bilateral;       Social History     Socioeconomic History    Marital status:    Tobacco Use    Smoking status: Current Every Day Smoker     Packs/day: 2.00     Years: 60.00     Pack years: 120.00    Smokeless tobacco: Never Used   Substance and Sexual Activity    Alcohol use: No     Alcohol/week: 0.0 standard drinks    Drug use: No    Sexual activity: Not Currently         Medications/Allergies: See med card    Vitals:    03/09/22 1107   BP: 120/75   Pulse: (!) 119   Weight: 62 kg (136 lb 11 oz)   Height: 5' 10" (1.778 m)   PainSc: 10-Worst pain ever   PainLoc: Back         Physical exam:  Gen: A and O x3, pleasant, well-groomed  Skin: No rashes or obvious lesions  HEENT: PERRLA, no obvious deformities on ears or in canals. Trachea midline.  CVS: Regular rate and rhythm, normal palpable pulses.  Resp:No " increased work of breathing, symmetrical chest rise.  Abdomen: Soft, NT/ND.  Musculoskeletal:  Presents in a wheelchair, able to stand without assistance    Neuro:  Lower extremities: 5/5 strength bilaterally, except for hip flexion 4/5 bilaterally  Reflexes: Patellar 1+, Achilles 1+ bilaterally.  Sensory:  Intact and symmetrical to light touch and pinprick in L2-S1 dermatomes bilaterally.    Lumbar spine:  Lumbar spine:  Range of motion severely reduced with both flexion and extension with increased pain especially on extension.  Jamir's test deferred  Supine straight leg raise is negative bilaterally.    Internal and external rotation of the hip causes no increased pain on either side.  Myofascial exam:  Moderate tenderness to palpation to the lower lumbar paraspinous muscles.  No tenderness to palpation or percussion to the lumbar spinous processes.  No tenderness to palpation to the thoracic paraspinous muscles or spinous processes.    Imagin2020 CT abdomen and pelvis renal stone protocol:  This is a report only but mentions a moderate compression fracture at L3 with slight retropulsion.  Unclear age.    20 L-spine MRI:  T11-12: Unremarkable   T12-L1: Unremarkable   L1-2: There is only a minimal disc bulge.  There is no spinal canal or significant foraminal stenosis.   L2-3: There is a diffuse disc bulge with osteophytic ridging.  There is no spinal stenosis.  There is mild crowding of the left lateral recess.  There is moderate right and mild left foraminal stenosis.   L3-4: There is a mild diffuse disc bulge.  There is mild facet joint arthropathy.  There is no spinal stenosis.  There is mild right foraminal stenosis.   L4-5: There is a mild diffuse disc bulge and mild facet joint arthropathy.  There is no spinal stenosis.  There is mild right foraminal stenosis.   L5-S1: There is mild facet joint arthropathy.  There is a synovial cyst posterior to the right facet joint.  There is a mild disc  bulge with osteophytic ridging contributing to moderate right and mild-to-moderate left foraminal stenosis.  There is no spinal stenosis.   There is edema throughout the L2 and L3 vertebral bodies.  There is approximately 20% loss of vertebral body height of L2 and 40% loss of vertebral body height at the L3 level worse towards the left.      Assessment:  The patient is an 81-year-old man with a history of pancreatic cancer, tobacco use who presents in referral from Dr. Rajeev Oliver for back pain.    1. Lumbar spondylosis     2. DDD (degenerative disc disease), lumbar     3. Diabetic polyneuropathy associated with diabetes mellitus due to underlying condition     4. Generalized weakness     5. Gait instability         Plan:  1. He continues to take hydromorphone 2 mg usually just at night, rarely during the day.  I have refilled this for the next several months.  His pain is worse when he is more active but he feels that this is not necessarily a problem.  I do agree that he should continue being as active as possible.  He has done home health physical therapy in the past, encouraged him to continue with the leg strengthening exercises for now.  2. We discussed his peripheral neuropathy in his feet, this continues to be problematic for him, he is taking gabapentin and is only helping slight bit but I do not think increasing dosing is going to improve things much more than that and I would not want him to become more sedated.  I will have him follow up in 3 months or sooner as needed.

## 2022-05-17 NOTE — TELEPHONE ENCOUNTER
Appt with Dr. Kimbrough on 06-10-22 canceled and rescheduled for 06-16-22 @ 10:15 d/t Dr. Kimbrough being out of the office.

## 2022-10-12 NOTE — DISCHARGE SUMMARY
[facility]  Discharge Note  Short Stay    Procedure(s) (LRB):  Block-nerve-medial branch-lumbar L1, 2, 3, 4, 5 (Bilateral)    OUTCOME: Patient tolerated treatment/procedure well without complication and is now ready for discharge.    DISPOSITION: Home or Self Care    FINAL DIAGNOSIS:  Lumbar spondylosis    FOLLOWUP: In clinic    DISCHARGE INSTRUCTIONS:    Discharge Procedure Orders   Diet Adult Regular     Notify your health care provider if you experience any of the following:  temperature >100.4     No dressing needed     Activity as tolerated          Dressing: bandage

## (undated) DEVICE — MARKER SKIN STND TIP BLUE BARR

## (undated) DEVICE — DRESSING TELFA STRL 4X3 LF

## (undated) DEVICE — Device

## (undated) DEVICE — SEE MEDLINE ITEM 157128

## (undated) DEVICE — SYR GLASS 5CC LUER LOK

## (undated) DEVICE — ADHESIVE MASTISOL VIAL 48/BX

## (undated) DEVICE — TRAY NERVE BLOCK

## (undated) DEVICE — GLOVE SURGICAL LATEX SZ 7

## (undated) DEVICE — ADHESIVE DERMABOND ADVANCED

## (undated) DEVICE — SHEET EENT SPLIT

## (undated) DEVICE — GAUZE SPONGE PEANUT STRL

## (undated) DEVICE — SEE MEDLINE ITEM 152622

## (undated) DEVICE — NDL SPINAL SPINOCAN 22GX3.5

## (undated) DEVICE — SEE L#120831

## (undated) DEVICE — BANDAGE DERMACEA STRETCH 4X1IN

## (undated) DEVICE — DRILL HAND KYPHOPLASTY 10X7.75

## (undated) DEVICE — SKINMARKER & RULER REGULAR X-F

## (undated) DEVICE — KIT IVAS ELITE SINGLE 10G 15MM

## (undated) DEVICE — DRESSING XEROFORM 1X8IN

## (undated) DEVICE — DRUG BACITRACIN ZINC

## (undated) DEVICE — NDL HYPO REG 25G X 1 1/2

## (undated) DEVICE — SEE MEDLINE ITEM 157148

## (undated) DEVICE — APPLICATOR CHLORAPREP CLR 10.5

## (undated) DEVICE — DRAPE INCISE IOBAN 2 23X17IN

## (undated) DEVICE — TRAY MINOR GEN SURG

## (undated) DEVICE — DRAPE C ARM 42 X 120 10/BX

## (undated) DEVICE — DRESSING TRANS 4X4 TEGADERM

## (undated) DEVICE — PAD ELECTROSURGICAL PAT PLATE

## (undated) DEVICE — REMOVER LOTION

## (undated) DEVICE — SEE MEDLINE ITEM 154981

## (undated) DEVICE — CHLORAPREP W TINT 26ML APPL

## (undated) DEVICE — NDL 18GA X1 1/2 REG BEVEL

## (undated) DEVICE — CORD BIPOLAR 12 FOOT

## (undated) DEVICE — ELECTRODE BLADE INSULATED 1 IN

## (undated) DEVICE — SEE MEDLINE ITEM 157194

## (undated) DEVICE — SUT VICRYL PLUS 3-0 SH 18IN

## (undated) DEVICE — SUT 4-0 VICRYL / SH

## (undated) DEVICE — CANNULA CVD 100MM X 20G

## (undated) DEVICE — DURAPREP SURG SCRUB 26ML

## (undated) DEVICE — STAPLER SKIN PROXIMATE WIDE

## (undated) DEVICE — ELECTRODE REM PLYHSV RETURN 9

## (undated) DEVICE — GOWN SURGICAL X-LARGE

## (undated) DEVICE — GAUZE FLUFF XXLG 36X36 2 PLY

## (undated) DEVICE — CONTAINER SPECIMEN STRL 4OZ

## (undated) DEVICE — NDL TUOHY EPIDURAL 20G X 3.5

## (undated) DEVICE — SUT MCRYL PLUS 4-0 PS2 27IN

## (undated) DEVICE — SUT 3-0 12-18IN SILK